# Patient Record
Sex: FEMALE | Race: WHITE | NOT HISPANIC OR LATINO | URBAN - METROPOLITAN AREA
[De-identification: names, ages, dates, MRNs, and addresses within clinical notes are randomized per-mention and may not be internally consistent; named-entity substitution may affect disease eponyms.]

---

## 2018-02-13 ENCOUNTER — INPATIENT (INPATIENT)
Facility: HOSPITAL | Age: 73
LOS: 13 days | DRG: 435 | End: 2018-02-27
Attending: INTERNAL MEDICINE | Admitting: INTERNAL MEDICINE
Payer: MEDICARE

## 2018-02-13 VITALS
OXYGEN SATURATION: 95 % | DIASTOLIC BLOOD PRESSURE: 83 MMHG | SYSTOLIC BLOOD PRESSURE: 131 MMHG | HEART RATE: 80 BPM | RESPIRATION RATE: 30 BRPM | TEMPERATURE: 98 F

## 2018-02-13 DIAGNOSIS — J18.9 PNEUMONIA, UNSPECIFIED ORGANISM: ICD-10-CM

## 2018-02-13 DIAGNOSIS — Z90.11 ACQUIRED ABSENCE OF RIGHT BREAST AND NIPPLE: Chronic | ICD-10-CM

## 2018-02-13 DIAGNOSIS — Z98.89 OTHER SPECIFIED POSTPROCEDURAL STATES: Chronic | ICD-10-CM

## 2018-02-13 DIAGNOSIS — Z95.828 PRESENCE OF OTHER VASCULAR IMPLANTS AND GRAFTS: Chronic | ICD-10-CM

## 2018-02-13 DIAGNOSIS — Z98.42 CATARACT EXTRACTION STATUS, LEFT EYE: Chronic | ICD-10-CM

## 2018-02-13 LAB
ALBUMIN SERPL ELPH-MCNC: 2.4 G/DL — LOW (ref 3.3–5)
ALP SERPL-CCNC: 244 U/L — HIGH (ref 40–120)
ALT FLD-CCNC: 48 U/L RC — HIGH (ref 10–45)
ANION GAP SERPL CALC-SCNC: 16 MMOL/L — SIGNIFICANT CHANGE UP (ref 5–17)
ANISOCYTOSIS BLD QL: SLIGHT — SIGNIFICANT CHANGE UP
APTT BLD: 31.7 SEC — SIGNIFICANT CHANGE UP (ref 27.5–37.4)
AST SERPL-CCNC: 90 U/L — HIGH (ref 10–40)
BASE EXCESS BLDV CALC-SCNC: 3.1 MMOL/L — HIGH (ref -2–2)
BASE EXCESS BLDV CALC-SCNC: 4.3 MMOL/L — HIGH (ref -2–2)
BILIRUB SERPL-MCNC: 5.2 MG/DL — HIGH (ref 0.2–1.2)
BUN SERPL-MCNC: 60 MG/DL — HIGH (ref 7–23)
CA-I SERPL-SCNC: 1.09 MMOL/L — LOW (ref 1.12–1.3)
CA-I SERPL-SCNC: 1.12 MMOL/L — SIGNIFICANT CHANGE UP (ref 1.12–1.3)
CALCIUM SERPL-MCNC: 8.7 MG/DL — SIGNIFICANT CHANGE UP (ref 8.4–10.5)
CHLORIDE BLDV-SCNC: 100 MMOL/L — SIGNIFICANT CHANGE UP (ref 96–108)
CHLORIDE BLDV-SCNC: 101 MMOL/L — SIGNIFICANT CHANGE UP (ref 96–108)
CHLORIDE SERPL-SCNC: 94 MMOL/L — LOW (ref 96–108)
CO2 BLDV-SCNC: 28 MMOL/L — SIGNIFICANT CHANGE UP (ref 22–30)
CO2 BLDV-SCNC: 29 MMOL/L — SIGNIFICANT CHANGE UP (ref 22–30)
CO2 SERPL-SCNC: 26 MMOL/L — SIGNIFICANT CHANGE UP (ref 22–31)
CREAT SERPL-MCNC: 1.67 MG/DL — HIGH (ref 0.5–1.3)
DACRYOCYTES BLD QL SMEAR: SLIGHT — SIGNIFICANT CHANGE UP
ELLIPTOCYTES BLD QL SMEAR: SLIGHT — SIGNIFICANT CHANGE UP
GAS PNL BLDV: 128 MMOL/L — LOW (ref 136–145)
GAS PNL BLDV: 128 MMOL/L — LOW (ref 136–145)
GAS PNL BLDV: SIGNIFICANT CHANGE UP
GLUCOSE BLDC GLUCOMTR-MCNC: 165 MG/DL — HIGH (ref 70–99)
GLUCOSE BLDV-MCNC: 76 MG/DL — SIGNIFICANT CHANGE UP (ref 70–99)
GLUCOSE BLDV-MCNC: 84 MG/DL — SIGNIFICANT CHANGE UP (ref 70–99)
GLUCOSE SERPL-MCNC: 79 MG/DL — SIGNIFICANT CHANGE UP (ref 70–99)
HCO3 BLDV-SCNC: 27 MMOL/L — SIGNIFICANT CHANGE UP (ref 21–29)
HCO3 BLDV-SCNC: 28 MMOL/L — SIGNIFICANT CHANGE UP (ref 21–29)
HCT VFR BLD CALC: 35.6 % — SIGNIFICANT CHANGE UP (ref 34.5–45)
HCT VFR BLDA CALC: 37 % — LOW (ref 39–50)
HCT VFR BLDA CALC: 39 % — SIGNIFICANT CHANGE UP (ref 39–50)
HGB BLD CALC-MCNC: 11.9 G/DL — SIGNIFICANT CHANGE UP (ref 11.5–15.5)
HGB BLD CALC-MCNC: 12.7 G/DL — SIGNIFICANT CHANGE UP (ref 11.5–15.5)
HGB BLD-MCNC: 11.6 G/DL — SIGNIFICANT CHANGE UP (ref 11.5–15.5)
HOROWITZ INDEX BLDV+IHG-RTO: SIGNIFICANT CHANGE UP
HOROWITZ INDEX BLDV+IHG-RTO: SIGNIFICANT CHANGE UP
INR BLD: 1.51 RATIO — HIGH (ref 0.88–1.16)
LACTATE BLDV-MCNC: 3.6 MMOL/L — HIGH (ref 0.7–2)
LACTATE BLDV-MCNC: 4 MMOL/L — CRITICAL HIGH (ref 0.7–2)
LACTATE BLDV-MCNC: 5 MMOL/L — CRITICAL HIGH (ref 0.7–2)
LYMPHOCYTES # BLD AUTO: 2.9 K/UL — SIGNIFICANT CHANGE UP (ref 1–3.3)
LYMPHOCYTES # BLD AUTO: 21 % — SIGNIFICANT CHANGE UP (ref 13–44)
MACROCYTES BLD QL: SIGNIFICANT CHANGE UP
MCHC RBC-ENTMCNC: 32.7 GM/DL — SIGNIFICANT CHANGE UP (ref 32–36)
MCHC RBC-ENTMCNC: 35.8 PG — HIGH (ref 27–34)
MCV RBC AUTO: 110 FL — HIGH (ref 80–100)
MICROCYTES BLD QL: SLIGHT — SIGNIFICANT CHANGE UP
MONOCYTES # BLD AUTO: 1.1 K/UL — HIGH (ref 0–0.9)
MONOCYTES NFR BLD AUTO: 9 % — SIGNIFICANT CHANGE UP (ref 2–14)
NEUTROPHILS # BLD AUTO: 11.1 K/UL — HIGH (ref 1.8–7.4)
NEUTROPHILS NFR BLD AUTO: 70 % — SIGNIFICANT CHANGE UP (ref 43–77)
NEUTS HYPERSEG # BLD: PRESENT — SIGNIFICANT CHANGE UP
NRBC # BLD: 2 /100 — HIGH (ref 0–0)
NT-PROBNP SERPL-SCNC: 2480 PG/ML — HIGH (ref 0–300)
OVALOCYTES BLD QL SMEAR: SLIGHT — SIGNIFICANT CHANGE UP
PCO2 BLDV: 36 MMHG — SIGNIFICANT CHANGE UP (ref 35–50)
PCO2 BLDV: 44 MMHG — SIGNIFICANT CHANGE UP (ref 35–50)
PH BLDV: 7.41 — SIGNIFICANT CHANGE UP (ref 7.35–7.45)
PH BLDV: 7.49 — HIGH (ref 7.35–7.45)
PLAT MORPH BLD: NORMAL — SIGNIFICANT CHANGE UP
PLATELET # BLD AUTO: 43 K/UL — LOW (ref 150–400)
PO2 BLDV: 26 MMHG — SIGNIFICANT CHANGE UP (ref 25–45)
PO2 BLDV: 27 MMHG — SIGNIFICANT CHANGE UP (ref 25–45)
POIKILOCYTOSIS BLD QL AUTO: SLIGHT — SIGNIFICANT CHANGE UP
POTASSIUM BLDV-SCNC: 4 MMOL/L — SIGNIFICANT CHANGE UP (ref 3.5–5)
POTASSIUM BLDV-SCNC: 4.6 MMOL/L — SIGNIFICANT CHANGE UP (ref 3.5–5)
POTASSIUM SERPL-MCNC: 4.4 MMOL/L — SIGNIFICANT CHANGE UP (ref 3.5–5.3)
POTASSIUM SERPL-SCNC: 4.4 MMOL/L — SIGNIFICANT CHANGE UP (ref 3.5–5.3)
PROT SERPL-MCNC: 5.3 G/DL — LOW (ref 6–8.3)
PROTHROM AB SERPL-ACNC: 16.5 SEC — HIGH (ref 9.8–12.7)
RAPID RVP RESULT: SIGNIFICANT CHANGE UP
RBC # BLD: 3.25 M/UL — LOW (ref 3.8–5.2)
RBC # FLD: 20 % — HIGH (ref 10.3–14.5)
RBC BLD AUTO: ABNORMAL
SAO2 % BLDV: 39 % — LOW (ref 67–88)
SAO2 % BLDV: 40 % — LOW (ref 67–88)
SCHISTOCYTES BLD QL AUTO: SLIGHT — SIGNIFICANT CHANGE UP
SODIUM SERPL-SCNC: 136 MMOL/L — SIGNIFICANT CHANGE UP (ref 135–145)
TROPONIN T SERPL-MCNC: <0.01 NG/ML — SIGNIFICANT CHANGE UP (ref 0–0.06)
WBC # BLD: 15.2 K/UL — HIGH (ref 3.8–10.5)
WBC # FLD AUTO: 15.2 K/UL — HIGH (ref 3.8–10.5)

## 2018-02-13 PROCEDURE — 99223 1ST HOSP IP/OBS HIGH 75: CPT

## 2018-02-13 PROCEDURE — 71045 X-RAY EXAM CHEST 1 VIEW: CPT | Mod: 26

## 2018-02-13 PROCEDURE — 99291 CRITICAL CARE FIRST HOUR: CPT | Mod: 25,GC

## 2018-02-13 PROCEDURE — 74176 CT ABD & PELVIS W/O CONTRAST: CPT | Mod: 26

## 2018-02-13 PROCEDURE — 93010 ELECTROCARDIOGRAM REPORT: CPT

## 2018-02-13 RX ORDER — ESCITALOPRAM OXALATE 10 MG/1
1 TABLET, FILM COATED ORAL
Qty: 0 | Refills: 0 | COMMUNITY

## 2018-02-13 RX ORDER — DEXTROSE 50 % IN WATER 50 %
12.5 SYRINGE (ML) INTRAVENOUS ONCE
Qty: 0 | Refills: 0 | Status: DISCONTINUED | OUTPATIENT
Start: 2018-02-13 | End: 2018-02-25

## 2018-02-13 RX ORDER — DEXTROSE 50 % IN WATER 50 %
1 SYRINGE (ML) INTRAVENOUS ONCE
Qty: 0 | Refills: 0 | Status: DISCONTINUED | OUTPATIENT
Start: 2018-02-13 | End: 2018-02-25

## 2018-02-13 RX ORDER — CEFEPIME 1 G/1
2000 INJECTION, POWDER, FOR SOLUTION INTRAMUSCULAR; INTRAVENOUS ONCE
Qty: 0 | Refills: 0 | Status: COMPLETED | OUTPATIENT
Start: 2018-02-13 | End: 2018-02-13

## 2018-02-13 RX ORDER — INSULIN ASPART 100 [IU]/ML
0 INJECTION, SOLUTION SUBCUTANEOUS
Qty: 0 | Refills: 0 | COMMUNITY

## 2018-02-13 RX ORDER — CEFEPIME 1 G/1
INJECTION, POWDER, FOR SOLUTION INTRAMUSCULAR; INTRAVENOUS
Qty: 0 | Refills: 0 | Status: DISCONTINUED | OUTPATIENT
Start: 2018-02-13 | End: 2018-02-13

## 2018-02-13 RX ORDER — FUROSEMIDE 40 MG
40 TABLET ORAL ONCE
Qty: 0 | Refills: 0 | Status: COMPLETED | OUTPATIENT
Start: 2018-02-13 | End: 2018-02-13

## 2018-02-13 RX ORDER — FUROSEMIDE 40 MG
40 TABLET ORAL DAILY
Qty: 0 | Refills: 0 | Status: DISCONTINUED | OUTPATIENT
Start: 2018-02-14 | End: 2018-02-14

## 2018-02-13 RX ORDER — FUROSEMIDE 40 MG
1 TABLET ORAL
Qty: 0 | Refills: 0 | COMMUNITY

## 2018-02-13 RX ORDER — INSULIN LISPRO 100/ML
VIAL (ML) SUBCUTANEOUS
Qty: 0 | Refills: 0 | Status: DISCONTINUED | OUTPATIENT
Start: 2018-02-13 | End: 2018-02-19

## 2018-02-13 RX ORDER — DEXTROSE 50 % IN WATER 50 %
25 SYRINGE (ML) INTRAVENOUS ONCE
Qty: 0 | Refills: 0 | Status: DISCONTINUED | OUTPATIENT
Start: 2018-02-13 | End: 2018-02-25

## 2018-02-13 RX ORDER — ASCORBIC ACID 60 MG
0 TABLET,CHEWABLE ORAL
Qty: 0 | Refills: 0 | COMMUNITY

## 2018-02-13 RX ORDER — GLUCAGON INJECTION, SOLUTION 0.5 MG/.1ML
1 INJECTION, SOLUTION SUBCUTANEOUS ONCE
Qty: 0 | Refills: 0 | Status: DISCONTINUED | OUTPATIENT
Start: 2018-02-13 | End: 2018-02-25

## 2018-02-13 RX ORDER — PANTOPRAZOLE SODIUM 20 MG/1
40 TABLET, DELAYED RELEASE ORAL
Qty: 0 | Refills: 0 | Status: DISCONTINUED | OUTPATIENT
Start: 2018-02-13 | End: 2018-02-25

## 2018-02-13 RX ORDER — DEXLANSOPRAZOLE 30 MG/1
1 CAPSULE, DELAYED RELEASE ORAL
Qty: 0 | Refills: 0 | COMMUNITY

## 2018-02-13 RX ORDER — INSULIN LISPRO 100/ML
VIAL (ML) SUBCUTANEOUS AT BEDTIME
Qty: 0 | Refills: 0 | Status: DISCONTINUED | OUTPATIENT
Start: 2018-02-13 | End: 2018-02-19

## 2018-02-13 RX ORDER — SIMVASTATIN 20 MG/1
10 TABLET, FILM COATED ORAL
Qty: 0 | Refills: 0 | COMMUNITY

## 2018-02-13 RX ORDER — PROPRANOLOL HCL 160 MG
0 CAPSULE, EXTENDED RELEASE 24HR ORAL
Qty: 0 | Refills: 0 | COMMUNITY

## 2018-02-13 RX ORDER — PROPRANOLOL HCL 160 MG
10 CAPSULE, EXTENDED RELEASE 24HR ORAL DAILY
Qty: 0 | Refills: 0 | Status: DISCONTINUED | OUTPATIENT
Start: 2018-02-13 | End: 2018-02-24

## 2018-02-13 RX ORDER — INSULIN GLARGINE 100 [IU]/ML
32 INJECTION, SOLUTION SUBCUTANEOUS AT BEDTIME
Qty: 0 | Refills: 0 | Status: DISCONTINUED | OUTPATIENT
Start: 2018-02-13 | End: 2018-02-25

## 2018-02-13 RX ORDER — DIPHENOXYLATE HCL/ATROPINE 2.5-.025MG
0 TABLET ORAL
Qty: 0 | Refills: 0 | COMMUNITY

## 2018-02-13 RX ORDER — SPIRONOLACTONE 25 MG/1
1 TABLET, FILM COATED ORAL
Qty: 0 | Refills: 0 | COMMUNITY

## 2018-02-13 RX ORDER — SIMVASTATIN 20 MG/1
10 TABLET, FILM COATED ORAL AT BEDTIME
Qty: 0 | Refills: 0 | Status: DISCONTINUED | OUTPATIENT
Start: 2018-02-13 | End: 2018-02-15

## 2018-02-13 RX ORDER — SODIUM CHLORIDE 9 MG/ML
1000 INJECTION, SOLUTION INTRAVENOUS
Qty: 0 | Refills: 0 | Status: DISCONTINUED | OUTPATIENT
Start: 2018-02-13 | End: 2018-02-27

## 2018-02-13 RX ORDER — POTASSIUM CHLORIDE 20 MEQ
0 PACKET (EA) ORAL
Qty: 0 | Refills: 0 | COMMUNITY

## 2018-02-13 RX ORDER — SIMVASTATIN 20 MG/1
0 TABLET, FILM COATED ORAL
Qty: 0 | Refills: 0 | COMMUNITY

## 2018-02-13 RX ORDER — ESCITALOPRAM OXALATE 10 MG/1
20 TABLET, FILM COATED ORAL DAILY
Qty: 0 | Refills: 0 | Status: DISCONTINUED | OUTPATIENT
Start: 2018-02-13 | End: 2018-02-25

## 2018-02-13 RX ORDER — VANCOMYCIN HCL 1 G
1000 VIAL (EA) INTRAVENOUS ONCE
Qty: 0 | Refills: 0 | Status: COMPLETED | OUTPATIENT
Start: 2018-02-13 | End: 2018-02-13

## 2018-02-13 RX ADMIN — CEFEPIME 100 MILLIGRAM(S): 1 INJECTION, POWDER, FOR SOLUTION INTRAMUSCULAR; INTRAVENOUS at 21:06

## 2018-02-13 RX ADMIN — SIMVASTATIN 10 MILLIGRAM(S): 20 TABLET, FILM COATED ORAL at 22:47

## 2018-02-13 RX ADMIN — Medication 250 MILLIGRAM(S): at 22:41

## 2018-02-13 RX ADMIN — Medication 40 MILLIGRAM(S): at 17:05

## 2018-02-13 RX ADMIN — INSULIN GLARGINE 32 UNIT(S): 100 INJECTION, SOLUTION SUBCUTANEOUS at 23:50

## 2018-02-13 NOTE — ED PROVIDER NOTE - MEDICAL DECISION MAKING DETAILS
72F hx htn hld dm breast CA on chemo (hasn't been on for 2 weeks) presents to the ED for SOB. SOB worse laying flat and with exertion. mild cough. Symptoms getting worse over the past 2 weeks. weight gain. fluid gain in abdomen in b/l LE. never had chf before. no hx blood clot. no fevers chills cp ha dizziness abd pain nausea or vomiting. On exam pt in mild resp distress rales b/l rrr abd distended but non-tender. aaox3 2+ b/l pitting edema. Concern for likely chf but will consider PE given active cancer. Will obtain labs xray ekg CE cta chest and admit. Sylvester Sloan M.D., Attending Physician

## 2018-02-13 NOTE — ED PROVIDER NOTE - ATTENDING CONTRIBUTION TO CARE
72F hx htn hld dm breast CA on chemo (hasn't been on for 2 weeks) presents to the ED for SOB. SOB worse laying flat and with exertion. mild cough. Symptoms getting worse over the past 2 weeks. weight gain. fluid gain in abdomen in b/l LE. never had chf before. no hx blood clot. no fevers chills cp ha dizziness abd pain nausea or vomiting. On exam pt in mild resp distress rales b/l rrr abd distended but non-tender. aaox3 2+ b/l pitting edema. Concern for likely chf but will consider PE given active cancer. Will obtain labs xray ekg CE cta chest and admit.     Constitutional: No fever or chills  Eyes: No visual changes  HEENT: No throat pain  CV: No chest pain  Resp: + SOB + cough  GI: No abd pain, nausea or vomiting +distension  : No dysuria  MSK: No musculoskeletal pain  Skin: No rash  Neuro: No headache     Constitutional: mild distress AAOx3  Eyes: PERRLA EOMI  Head: Normocephalic atraumatic  Mouth: MMM  Cardiac: regular rate 2+ b/l pitting edema.  Resp: rales b/l with diminished at bases  GI: Abd s/nt/nd  Neuro: CN2-12 intact  Skin: No rashes   Sylvester Sloan M.D., Attending Physician

## 2018-02-13 NOTE — ED PROVIDER NOTE - PHYSICAL EXAMINATION
Gen: moderate respiratory distress, AOx3  Head: NCAT  HEENT: PERRL, oral mucosa moist, normal conjunctiva, neck supple  Lung: CTAB, tachypneic, +supraclavicular retractions with belly breathing  CV: rrr, no murmur, Normal perfusion  Abd: soft, NT, diffusely distended with fluid wave  MSK: +3 b/l LE pitting edema, no visible deformities  Neuro: No focal neurologic deficits  Skin: No rash   Psych: normal affect

## 2018-02-13 NOTE — H&P ADULT - NSHPPHYSICALEXAM_GEN_ALL_CORE
PHYSICAL EXAM:  Vital Signs Last 24 Hrs  T(C): 36.7 (02-13-18 @ 19:13)  T(F): 98 (02-13-18 @ 19:13), Max: 98 (02-13-18 @ 19:13)  HR: 89 (02-13-18 @ 21:49) (68 - 89)  BP: 119/52 (02-13-18 @ 21:49)  BP(mean): --  RR: 25 (02-13-18 @ 21:49) (25 - 30)  SpO2: 96% (02-13-18 @ 21:49) (95% - 100%)  Wt(kg): --    Constitutional: NAD, now off BIPAP  EYES: EOMI  ENT:  Normal Hearing, no tonsillar exudates   Neck: Soft and supple, No JVD  Lungs: decreased breath sound left base  Heart: S1 and S2, regular rate and rhythm, no Murmurs, gallops or rubs  Abdomen: Bowel Sounds present, soft, nontender, nondistended, no guarding, no rebound  Extremities: No cyanosis or clubbing; warm to touch  Vascular: 2+ peripheral pulses lower ex  Neurological: A/O x 3, no focal deficits  Musculoskeletal: 5/5 strength b/l upper and lower extremities; +3 edema b/l LE  Skin: No rashes  Psych: no depression or anhedonia  HEME: no bruises, no nose bleeds

## 2018-02-13 NOTE — H&P ADULT - PSH
H/O craniotomy    H/O mastectomy, right    H/O oral surgery  wisdom tooth removal  Portacath in place  left upper chest  S/P left cataract extraction

## 2018-02-13 NOTE — ED PROVIDER NOTE - PMH
Breast cancer    Depression    Endogenous hyperlipemia    Hypertension    Tremor    Type 2 diabetes mellitus

## 2018-02-13 NOTE — H&P ADULT - HISTORY OF PRESENT ILLNESS
71 yo female with PMH of breast cancer with mets to lung, liver, bones, s/p mastectomy, s/p depression, HTN, HLD, T2DM, presents with SOB and LE swelling.  Patient has been doing well except last 2-3 weeks had SOB and LE edema.  She also had cough, for which she was started on prednisone.  Currently cough has completely resolved.  She was initially started on lasix 2 pills of 20mg daily, which was decreased to 1 pill/daily two weeks ago because patient has been urinating too much.  Over the last 2 weeks, she has been becoming progressively more weak.  Last three days she had worsening of her LE edema to the point that she can't ambulate well.  She can't walk more than a few feet without having dyspnea.  She now has to sleep on a recliner.  She also has unsteady gait, had recent MRI brain which was apparently negative. 73 yo female with PMH of breast cancer with mets to lung, liver, bones, s/p mastectomy, s/p depression, HTN, HLD, T2DM, presents with SOB and LE swelling.  Patient has been doing well except last 2-3 weeks had SOB and LE edema.  She also had cough, for which she was started on prednisone.  Currently cough has completely resolved.  She was initially started on lasix 2 pills of 20mg daily, which was decreased to 1 pill/daily two weeks ago because patient has been urinating too much.  Over the last 2 weeks, she has been becoming progressively more weak.  Last three days she had worsening of her LE edema to the point that she can't ambulate well.  She can't walk more than a few feet without having dyspnea.  She now has to sleep on a recliner.  She denies PND.  She also has unsteady gait, had recent MRI brain which was apparently negative.  She admits to drinking 3-4 bottles of water and 1-2 cans of gingerale daily.  She denies any chest pain, dizziness, blurry vision, cough, fever, urinary or bowel changes.

## 2018-02-13 NOTE — H&P ADULT - PROBLEM SELECTOR PLAN 1
Patient with dyspnea and LE edema, possibly secondary to fluid overload  PE could not be ruled out, though less likely; not tachycardic and now saturating well on room air  CTA chest could not be done due to PIERRE  will get V/Q scan  no fever or cough, unlikely pneumonia though CT scan shows left base opacity ?pleural effusion;  leukocytosis likely due to steroid use   s/p IV vancomycin and IV cefepime; we will hold off on further abx at this time, unless patient develops fever or other symptoms suggestive of pneumonia  s/p lasix 40mg IV in ED  will start patient on lasix 40mg IV daily  check echocardiogram  monitor ins/outs and daily weight Patient with dyspnea and LE edema, possibly secondary to fluid overload  PE could not be ruled out, though less likely; not tachycardic and now saturating well on room air  CTA chest could not be done due to PIERRE  will get V/Q scan  no fever or cough, unlikely pneumonia though CT scan shows left base opacity ?pleural effusion;  leukocytosis likely due to steroid use   s/p IV vancomycin and IV cefepime; we will hold off on further abx at this time, unless patient develops fever or other symptoms suggestive of pneumonia  s/p lasix 40mg IV in ED  will start patient on lasix 40mg IV daily  check echocardiogram  monitor ins/outs and daily weight  check venous duplex to rule out DVT LE

## 2018-02-13 NOTE — ED ADULT NURSE NOTE - OBJECTIVE STATEMENT
72 year old female a/ox3 sleepy and slow to respond presenting to ed with progressively worsening bilateral lower ext swelling and shortness of breath. patient with pmh of sided breast ca with mets to spine, liver, and Lungs, was on chemo 2 weeks ago but per family chemo was stopped due to the swelling. denies any fever/chills. on lasix 20mg po at home. denies cp/pressure, respirations shallow and tachypneic  no change in bowel/bladder skin dry warm intact. family at bedside.

## 2018-02-13 NOTE — H&P ADULT - PROBLEM SELECTOR PLAN 4
likely due to metastasis  no biliary obstruction seen on CT scan  monitor LFTs for now  check US doppler to assess for PVT

## 2018-02-13 NOTE — H&P ADULT - PROBLEM SELECTOR PLAN 10
I had a 20 minute discussion with patient and her , Jesse who is also HCP regarding goals of care.  I had discussed all resuscitative measures.  Both patient and her  verbalized understanding.  At this time, she chooses to be DNR/DNI;  Form signed and placed in chart.  Order for DNR/DNI placed

## 2018-02-13 NOTE — ED PROVIDER NOTE - CARE PLAN
Principal Discharge DX:	Pneumonia  Secondary Diagnosis:	Dyspnea, unspecified type  Secondary Diagnosis:	CHF exacerbation

## 2018-02-13 NOTE — ED PROVIDER NOTE - NS ED ROS FT
ROS: no CP +SOB. +cough. no fever. no n/v/d/c. no abd pain. no rash. no bleeding. no urinary complaints. +weakness. no vision changes. no HA. no neck/back pain. no extremity swelling/deformity. +change in mental status.

## 2018-02-13 NOTE — H&P ADULT - NSHPLABSRESULTS_GEN_ALL_CORE
Labs personally reviewed:                          11.6   15.2  )-----------( 43       ( 13 Feb 2018 17:08 )             35.6     02-13    136  |  94<L>  |  60<H>  ----------------------------<  79  4.4   |  26  |  1.67<H>    Ca    8.7      13 Feb 2018 17:08    TPro  5.3<L>  /  Alb  2.4<L>  /  TBili  5.2<H>  /  DBili  x   /  AST  90<H>  /  ALT  48<H>  /  AlkPhos  244<H>  02-13    CARDIAC MARKERS ( 13 Feb 2018 17:08 )  x     / <0.01 ng/mL / x     / x     / x          LIVER FUNCTIONS - ( 13 Feb 2018 17:08 )  Alb: 2.4 g/dL / Pro: 5.3 g/dL / ALK PHOS: 244 U/L / ALT: 48 U/L RC / AST: 90 U/L / GGT: x           PT/INR - ( 13 Feb 2018 17:08 )   PT: 16.5 sec;   INR: 1.51 ratio         PTT - ( 13 Feb 2018 17:08 )  PTT:31.7 sec    CAPILLARY BLOOD GLUCOSE      POCT Blood Glucose.: 116 mg/dL (13 Feb 2018 16:36)      Imaging:  CXR personally reviewed: no focal opacity  CT abd/pelv reviewed: left pleural effusion; left basilar consolidation; metastatic disease to liver; moderate ascites, sclerotic osseous metastases to vertebra    EKG personally reviewed: nsr, no acute st changes    Old records reviewed:  2/1/18 WBC 4, Hb 11.2, Platelet 89; sodium 137, potassium 2.8, chloride 101, BUN 34, cr 1.2; January 2018 creatinine 0.8  2/1/18 Bili 2.5, alk phos 189, AST 97, ALT 87

## 2018-02-13 NOTE — H&P ADULT - PROBLEM SELECTOR PLAN 5
hold triamterene/HCTZ for now  hold aldactone for now Unclear etiology, not likely septic  will repeat lactate level

## 2018-02-13 NOTE — ED PROVIDER NOTE - CRITICAL CARE PROVIDED
additional history taking/documentation/direct patient care (not related to procedure)/conducted a detailed discussion of DNR status

## 2018-02-13 NOTE — H&P ADULT - PROBLEM SELECTOR PLAN 3
baseline creatinine likely 0.8  unclear etiology of PIERRE, ? metastases; CT scan shows hyperdense lesions  will monitor BMP while patient is on diuretics  hold triamterene/HCTZ for now  hold aldactone for now

## 2018-02-13 NOTE — H&P ADULT - PROBLEM SELECTOR PLAN 2
s/p lasix 40 mg IV in ED  will continue lasix 40mg IV daily for now  check echocardiogram  monitor ins/outs and daily weight  hold aldactone and triamterene/HCTZ for now

## 2018-02-13 NOTE — ED PROVIDER NOTE - OBJECTIVE STATEMENT
71yo F with SOB x several weeks, getting worse, today generalized weakness, increasing confusion. no fever. no trauma. no bleeding. no h/o CHF/ +cough nonproductive. chemo held for 2 weeks due to LE swelling and SOB. on lasix 20mg qD. no new meds. has not missed meds. no h/o DVT/PE. no sick contats/travel. has metastatic breast CA.     Onc- [facility] ([facility])

## 2018-02-13 NOTE — H&P ADULT - ASSESSMENT
73 yo female with PMH of breast cancer with mets to lung, liver, bones, s/p mastectomy, s/p depression, HTN, HLD, T2DM, presents with SOB and LE swelling.

## 2018-02-13 NOTE — ED PROVIDER NOTE - PROGRESS NOTE DETAILS
patient with low gfr - spoke with family regarding contrast for chest and alternative of v/q, family rather avoid contrast at this time. like dx chf less likely PE - pt also noted to have elevated tbili and alk phos. likely from mets however now with slight discomfort in ruq. will obtain non-con ct abd to eval for biliary pathology. Sylvester Sloan M.D., Attending Physician

## 2018-02-14 LAB
ALBUMIN SERPL ELPH-MCNC: 2.2 G/DL — LOW (ref 3.3–5)
ALP SERPL-CCNC: 221 U/L — HIGH (ref 40–120)
ALT FLD-CCNC: 41 U/L RC — SIGNIFICANT CHANGE UP (ref 10–45)
AMMONIA BLD-MCNC: 47 UMOL/L — SIGNIFICANT CHANGE UP (ref 11–55)
ANION GAP SERPL CALC-SCNC: 15 MMOL/L — SIGNIFICANT CHANGE UP (ref 5–17)
AST SERPL-CCNC: 82 U/L — HIGH (ref 10–40)
BASOPHILS # BLD AUTO: 0 K/UL — SIGNIFICANT CHANGE UP (ref 0–0.2)
BASOPHILS NFR BLD AUTO: 0.1 % — SIGNIFICANT CHANGE UP (ref 0–2)
BILIRUB SERPL-MCNC: 4.8 MG/DL — HIGH (ref 0.2–1.2)
BUN SERPL-MCNC: 65 MG/DL — HIGH (ref 7–23)
CALCIUM SERPL-MCNC: 8.5 MG/DL — SIGNIFICANT CHANGE UP (ref 8.4–10.5)
CHLORIDE SERPL-SCNC: 94 MMOL/L — LOW (ref 96–108)
CO2 SERPL-SCNC: 26 MMOL/L — SIGNIFICANT CHANGE UP (ref 22–31)
CREAT SERPL-MCNC: 1.75 MG/DL — HIGH (ref 0.5–1.3)
EOSINOPHIL # BLD AUTO: 0 K/UL — SIGNIFICANT CHANGE UP (ref 0–0.5)
EOSINOPHIL NFR BLD AUTO: 0.3 % — SIGNIFICANT CHANGE UP (ref 0–6)
GLUCOSE BLDC GLUCOMTR-MCNC: 124 MG/DL — HIGH (ref 70–99)
GLUCOSE BLDC GLUCOMTR-MCNC: 138 MG/DL — HIGH (ref 70–99)
GLUCOSE BLDC GLUCOMTR-MCNC: 149 MG/DL — HIGH (ref 70–99)
GLUCOSE BLDC GLUCOMTR-MCNC: 154 MG/DL — HIGH (ref 70–99)
GLUCOSE SERPL-MCNC: 162 MG/DL — HIGH (ref 70–99)
HBA1C BLD-MCNC: 5.6 % — SIGNIFICANT CHANGE UP (ref 4–5.6)
HCT VFR BLD CALC: 30.9 % — LOW (ref 34.5–45)
HGB BLD-MCNC: 10.9 G/DL — LOW (ref 11.5–15.5)
LACTATE BLDV-MCNC: 2.4 MMOL/L — HIGH (ref 0.7–2)
LYMPHOCYTES # BLD AUTO: 18.4 % — SIGNIFICANT CHANGE UP (ref 13–44)
LYMPHOCYTES # BLD AUTO: 2.1 K/UL — SIGNIFICANT CHANGE UP (ref 1–3.3)
MAGNESIUM SERPL-MCNC: 2.4 MG/DL — SIGNIFICANT CHANGE UP (ref 1.6–2.6)
MCHC RBC-ENTMCNC: 35.2 GM/DL — SIGNIFICANT CHANGE UP (ref 32–36)
MCHC RBC-ENTMCNC: 38.1 PG — HIGH (ref 27–34)
MCV RBC AUTO: 108 FL — HIGH (ref 80–100)
MONOCYTES # BLD AUTO: 0.6 K/UL — SIGNIFICANT CHANGE UP (ref 0–0.9)
MONOCYTES NFR BLD AUTO: 5.3 % — SIGNIFICANT CHANGE UP (ref 2–14)
NEUTROPHILS # BLD AUTO: 8.6 K/UL — HIGH (ref 1.8–7.4)
NEUTROPHILS NFR BLD AUTO: 75.9 % — SIGNIFICANT CHANGE UP (ref 43–77)
PHOSPHATE SERPL-MCNC: 5.5 MG/DL — HIGH (ref 2.5–4.5)
PLATELET # BLD AUTO: 35 K/UL — LOW (ref 150–400)
POTASSIUM SERPL-MCNC: 3.7 MMOL/L — SIGNIFICANT CHANGE UP (ref 3.5–5.3)
POTASSIUM SERPL-SCNC: 3.7 MMOL/L — SIGNIFICANT CHANGE UP (ref 3.5–5.3)
PROT SERPL-MCNC: 4.5 G/DL — LOW (ref 6–8.3)
RBC # BLD: 2.85 M/UL — LOW (ref 3.8–5.2)
RBC # FLD: 20 % — HIGH (ref 10.3–14.5)
SODIUM SERPL-SCNC: 135 MMOL/L — SIGNIFICANT CHANGE UP (ref 135–145)
WBC # BLD: 11.3 K/UL — HIGH (ref 3.8–10.5)
WBC # FLD AUTO: 11.3 K/UL — HIGH (ref 3.8–10.5)

## 2018-02-14 PROCEDURE — 78582 LUNG VENTILAT&PERFUS IMAGING: CPT | Mod: 26

## 2018-02-14 PROCEDURE — 93306 TTE W/DOPPLER COMPLETE: CPT | Mod: 26

## 2018-02-14 PROCEDURE — 93970 EXTREMITY STUDY: CPT | Mod: 26

## 2018-02-14 PROCEDURE — 93975 VASCULAR STUDY: CPT | Mod: 26

## 2018-02-14 RX ADMIN — Medication 40 MILLIGRAM(S): at 06:33

## 2018-02-14 RX ADMIN — INSULIN GLARGINE 32 UNIT(S): 100 INJECTION, SOLUTION SUBCUTANEOUS at 21:09

## 2018-02-14 RX ADMIN — Medication 10 MILLIGRAM(S): at 06:35

## 2018-02-14 RX ADMIN — ESCITALOPRAM OXALATE 20 MILLIGRAM(S): 10 TABLET, FILM COATED ORAL at 19:47

## 2018-02-14 RX ADMIN — SIMVASTATIN 10 MILLIGRAM(S): 20 TABLET, FILM COATED ORAL at 19:47

## 2018-02-14 RX ADMIN — PANTOPRAZOLE SODIUM 40 MILLIGRAM(S): 20 TABLET, DELAYED RELEASE ORAL at 06:35

## 2018-02-14 RX ADMIN — Medication 5 MILLIGRAM(S): at 06:35

## 2018-02-14 NOTE — CONSULT NOTE ADULT - ASSESSMENT
72 year old female with breast cancer with mets to lung, liver, bones, s/p mastectomy, s/p depression, HTN, HLD, T2DM admitted with LE edema/ SOB     1. cv stable   no chest pain or sob.  EKG revealing no evidence of acute ischemia/ACS   echo revealing normal lv sys fx     2. SOB   likely secondary to pulm disease   echo revealing normal lv sys fx   VQ scan revealing low probability of PE   chest xray revealing sml Left pleural effusion   Hold lasix given PIERRE , no evidence of decomp CHF on exam   pulm f/u     3. LE edema   likely secondary to hypoalbuminemia / extensive mets disease   Ct abd/pelvis revealing bilobar hepatic mets/ sclerotic osseous mets/ sml left pleural effusion   elevated lfts- pending ammonia levels   LE duplex negative for thrombosis   lasix on hold given PIERRE   med/ onc f/u     3. htn   continue with current anti-htn med

## 2018-02-14 NOTE — CONSULT NOTE ADULT - CONSULT REASON
sob/ LE edema   hx breast cancer with mets to lung, liver, bones, s/p mastectomy, s/p depression, HTN, HLD, T2DM

## 2018-02-14 NOTE — PROGRESS NOTE ADULT - SUBJECTIVE AND OBJECTIVE BOX
Patient is a 72y old  Female who presents with a chief complaint of shortness of breath (13 Feb 2018 21:46)      SUBJECTIVE / OVERNIGHT EVENTS: still SOB.  Review of Systems  chest pain no  palpitations no  sob no  nausea no  headache no    MEDICATIONS  (STANDING):  dextrose 5%. 1000 milliLiter(s) (50 mL/Hr) IV Continuous <Continuous>  dextrose 50% Injectable 12.5 Gram(s) IV Push once  dextrose 50% Injectable 25 Gram(s) IV Push once  dextrose 50% Injectable 25 Gram(s) IV Push once  escitalopram 20 milliGRAM(s) Oral daily  furosemide   Injectable 40 milliGRAM(s) IV Push daily  insulin glargine Injectable (LANTUS) 32 Unit(s) SubCutaneous at bedtime  insulin lispro (HumaLOG) corrective regimen sliding scale   SubCutaneous three times a day before meals  insulin lispro (HumaLOG) corrective regimen sliding scale   SubCutaneous at bedtime  pantoprazole    Tablet 40 milliGRAM(s) Oral before breakfast  predniSONE   Tablet 5 milliGRAM(s) Oral daily  propranolol 10 milliGRAM(s) Oral daily  simvastatin 10 milliGRAM(s) Oral at bedtime    MEDICATIONS  (PRN):  dextrose Gel 1 Dose(s) Oral once PRN Blood Glucose LESS THAN 70 milliGRAM(s)/deciliter  glucagon  Injectable 1 milliGRAM(s) IntraMuscular once PRN Glucose LESS THAN 70 milligrams/deciliter          PHYSICAL EXAM:  GENERAL: weak  HEAD:  Atraumatic, Normocephalic  EYES: EOMI, PERRLA, conjunctiva and sclera clear  NECK: Supple, No JVD  CHEST/LUNG: Clear to auscultation bilaterally; No wheeze s/p R mastectomy  HEART: Regular rate and rhythm; No murmurs, rubs, or gallops  ABDOMEN: Soft, Nontender, Nondistended; Bowel sounds present  EXTREMITIES:  2+ Peripheral Pulses, No clubbing, cyanosis, 2+ edema  PSYCH: anxious  NEUROLOGY: non-focal  SKIN: No rashes or lesions    LABS:                        10.9   11.3  )-----------( 35       ( 14 Feb 2018 13:35 )             30.9     02-13    136  |  94<L>  |  60<H>  ----------------------------<  79  4.4   |  26  |  1.67<H>    Ca    8.7      13 Feb 2018 17:08    TPro  5.3<L>  /  Alb  2.4<L>  /  TBili  5.2<H>  /  DBili  x   /  AST  90<H>  /  ALT  48<H>  /  AlkPhos  244<H>  02-13    PT/INR - ( 13 Feb 2018 17:08 )   PT: 16.5 sec;   INR: 1.51 ratio         PTT - ( 13 Feb 2018 17:08 )  PTT:31.7 sec  CARDIAC MARKERS ( 13 Feb 2018 17:08 )  x     / <0.01 ng/mL / x     / x     / x            < from: NM Pulmonary Ventilation/Perfusion Scan (02.14.18 @ 10:56) >    IMPRESSION: Abnormal ventilation/perfusion lung scan.    Very low probability of pulmonary embolus.      < end of copied text >    RADIOLOGY & ADDITIONAL TESTS:    Imaging Personally Reviewed:    Consultant(s) Notes Reviewed:      Care Discussed with Consultants/Other Providers:

## 2018-02-14 NOTE — CONSULT NOTE ADULT - SUBJECTIVE AND OBJECTIVE BOX
[facility] - [facility]  CONSULT NOTE    HPI: 72 year old gentlewoman with history of HTN, DM, HLD and breast cancer breast cancer metastatic to the lung, liver and bones s/p mastectomy and radiation to the chest. She is followed by Dr. Juan Nelson of our practice for a chronic cough felt to be due to radiation pneumonitis and has been treated with a course of slowly tapering prednisone as well as Breo Ellipta Her spirometry is c/w moderate restrictive lung disease.     PMHX:  Breast cancer - metastatic   Hyperlipemia  Hypertension  Diabetes mellitus  Depression  Tremor    PSHX:  Portacath in place  S/P left cataract extraction  H/O oral surgery  H/O craniotomy  H/O mastectomy, right      FAMILY HISTORY:  Family history of breast cancer (Father)    SOCIAL HISTORY: remote smoker; ; lives with     Pulmonary Medications:       Antimicrobials:      Cardiology:  furosemide   Injectable 40 milliGRAM(s) IV Push daily  propranolol 10 milliGRAM(s) Oral daily      Other:  dextrose 5%. 1000 milliLiter(s) IV Continuous <Continuous>  dextrose 50% Injectable 12.5 Gram(s) IV Push once  dextrose 50% Injectable 25 Gram(s) IV Push once  dextrose 50% Injectable 25 Gram(s) IV Push once  dextrose Gel 1 Dose(s) Oral once PRN  escitalopram 20 milliGRAM(s) Oral daily  glucagon  Injectable 1 milliGRAM(s) IntraMuscular once PRN  insulin glargine Injectable (LANTUS) 32 Unit(s) SubCutaneous at bedtime  insulin lispro (HumaLOG) corrective regimen sliding scale   SubCutaneous three times a day before meals  insulin lispro (HumaLOG) corrective regimen sliding scale   SubCutaneous at bedtime  pantoprazole    Tablet 40 milliGRAM(s) Oral before breakfast  predniSONE   Tablet 5 milliGRAM(s) Oral daily  simvastatin 10 milliGRAM(s) Oral at bedtime      Allergies    Levaquin (Short breath)  penicillin (Swelling)    HOME MEDICATIONS: see  H & P    REVIEW OF SYSTEMS:  Constitutional: As per HPI  HEENT: Within normal limits  CV: As per HPI  Resp: As per HPI  GI: Within normal limits   : Within normal limits  Musculoskeletal: Within normal limits  Skin: Within normal limits  Neurological: Within normal limits  Psychiatric: Within normal limits  Endocrine: Within normal limits  Hematologic/Lymphatic: Within normal limits  Allergic/Immunologic: Within normal limits    [ ] All other systems negative    OBJECTIVE:      I&O's Detail    13 Feb 2018 07:01  -  14 Feb 2018 07:00  --------------------------------------------------------  IN:    Oral Fluid: 150 mL  Total IN: 150 mL    OUT:    Voided: 550 mL  Total OUT: 550 mL    Total NET: -400 mL      14 Feb 2018 07:01  -  14 Feb 2018 14:28  --------------------------------------------------------  IN:    Oral Fluid: 440 mL  Total IN: 440 mL    OUT:    Voided: 400 mL  Total OUT: 400 mL    Total NET: 40 mL    Daily Height in cm: 162.56 (13 Feb 2018 22:15)      POCT Blood Glucose.: 138 mg/dL (14 Feb 2018 12:22)  POCT Blood Glucose.: 124 mg/dL (14 Feb 2018 07:49)  POCT Blood Glucose.: 165 mg/dL (13 Feb 2018 23:47)  POCT Blood Glucose.: 116 mg/dL (13 Feb 2018 16:36)      PHYSICAL EXAM:  ICU Vital Signs Last 24 Hrs  T(C): 36.2 (14 Feb 2018 12:40), Max: 36.7 (13 Feb 2018 19:13)  T(F): 97.2 (14 Feb 2018 12:40), Max: 98 (13 Feb 2018 19:13)  HR: 77 (14 Feb 2018 12:40) (68 - 92)  BP: 119/68 (14 Feb 2018 12:40) (102/56 - 158/90)  BP(mean): --  ABP: --  ABP(mean): --  RR: 18 (14 Feb 2018 12:40) (18 - 30)  SpO2: 99% (14 Feb 2018 12:40) (95% - 100%)    General: Awake. Alert. Cooperative. No distress. Appears stated age 	  HEENT:   Atraumatic. Normocephalic. Anicteric. Normal oral mucosa, PERRL, EOMI  Neck: Supple. Trachea midline. Thyroid without enlargement/tenderness/nodules. No carotid bruit. No JVD	  Cardiovascular: Regular rate and rhythm. S1 S2 normal. No murmurs, rubs or gallops  Respiratory: Respirations unlabored. Clear to auscultation and percussion bilaterally  Abdomen: Soft. Non-tender. Non-distended. No organomegaly. No masses. Normal bowel sounds	  Extremities: Warm to touch. No clubbing or cyanosis. No pedal edema.  Pulses: 2+ peripheral pulses all extremities	  Skin: Normal skin color. No rashes or lesions. No ecchymoses, No cyanosis. Warm to touch  Lymph Nodes: Cervical, supraclavicular and axillary nodes normal  Neurological: Motor and sensory examination equal and normal. A and O x 3  Psychiatry: Appropriate mood and affect.      LABS:                          10.9   11.3  )-----------( 35       ( 14 Feb 2018 13:35 )             30.9                         11.6   15.2  )-----------( 43       ( 13 Feb 2018 17:08 )             35.6     02-13    136  |  94<L>  |  60<H>  ----------------------------<  79  4.4   |  26  |  1.67<H>    Ca      8.7      02-13      TPro  5.3<L>  /  Alb  2.4<L>  /  TBili  5.2<H>  /  DBili  x   /  AST  90<H>  /  ALT  48<H>  /  AlkPhos  244<H>  02-13    PT/INR - ( 13 Feb 2018 17:08 )   PT: 16.5 sec;   INR: 1.51 ratio       PTT - ( 13 Feb 2018 17:08 )  PTT:31.7 sec    Venous Blood Gas:  02-14 @ 13:36  --/--/--/--/--  VBG Lactate: 2.4    Venous Blood Gas:  02-13 @ 23:56  --/--/--/--/--  VBG Lactate: 3.6    Venous Blood Gas:  02-13 @ 18:36  7.49/36/26/27/40  VBG Lactate: 4.0    Venous Blood Gas:  02-13 @ 17:08  7.41/44/27/28/39  VBG Lactate: 5.0        Serum Pro-Brain Natriuretic Peptide: 2480 pg/mL (02-13 @ 17:08)      CARDIAC MARKERS ( 13 Feb 2018 17:08 )  x     / <0.01 ng/mL / x     / x     / x              MICROBIOLOGY:       RADIOLOGY:  [x ] Chest radiographs reviewed and interpreted by me [facility] - [facility]  CONSULT NOTE    HPI: 72 year old gentlewoman, remote smoker, with history of HTN, DM, HLD and breast cancer metastatic to the lung, liver and bone s/p mastectomy, craniotomy for a cranial metastasis and radiation to the left chest. She is followed by Dr. Juan Nelson of our practice for a chronic cough felt to be due to radiation pneumonitis and has been treated with a course of slowly tapering prednisone as well as Breo Ellipta with marked improvement. Her spirometry is c/w moderate restrictive lung disease. The patient was doing well ~ 1 month ago able to drive and attend family functions. Over the last several weeks, the patient has developed profound fatigue associated with worsening mental status. She has developed severe lower extremity swelling making ambulation difficult especially in the setting of a mildly unsteady gait. She has developed shortness of breath with minimal exertion without chest congestion or wheeze. No fevers, chills or sweats. No chest pain/pressure or palpitations. No URI/viral type symptoms    PMHX:  Breast cancer - metastatic   Hyperlipemia  Hypertension  Diabetes mellitus  Depression  Tremor    PSHX:  Portacath in place  S/P left cataract extraction  H/O oral surgery  H/O craniotomy for cranial met resection  H/O mastectomy, right      FAMILY HISTORY:  Family history of breast cancer (Father)    SOCIAL HISTORY: remote smoker; ; lives with     Pulmonary Medications:       Antimicrobials:      Cardiology:  furosemide   Injectable 40 milliGRAM(s) IV Push daily  propranolol 10 milliGRAM(s) Oral daily      Other:  dextrose 5%. 1000 milliLiter(s) IV Continuous <Continuous>  dextrose 50% Injectable 12.5 Gram(s) IV Push once  dextrose 50% Injectable 25 Gram(s) IV Push once  dextrose 50% Injectable 25 Gram(s) IV Push once  dextrose Gel 1 Dose(s) Oral once PRN  escitalopram 20 milliGRAM(s) Oral daily  glucagon  Injectable 1 milliGRAM(s) IntraMuscular once PRN  insulin glargine Injectable (LANTUS) 32 Unit(s) SubCutaneous at bedtime  insulin lispro (HumaLOG) corrective regimen sliding scale   SubCutaneous three times a day before meals  insulin lispro (HumaLOG) corrective regimen sliding scale   SubCutaneous at bedtime  pantoprazole    Tablet 40 milliGRAM(s) Oral before breakfast  predniSONE   Tablet 5 milliGRAM(s) Oral daily  simvastatin 10 milliGRAM(s) Oral at bedtime      Allergies    Levaquin (Short breath)  penicillin (Swelling)    HOME MEDICATIONS: see  H & P    REVIEW OF SYSTEMS:  Constitutional: As per HPI  HEENT: Within normal limits  CV: As per HPI  Resp: As per HPI  GI: Within normal limits   : Within normal limits  Musculoskeletal: Within normal limits  Skin: Within normal limits  Neurological: As per HPI  Psychiatric: Within normal limits  Endocrine: Within normal limits  Hematologic/Lymphatic: Within normal limits  Allergic/Immunologic: Within normal limits    [x] All other systems negative    OBJECTIVE:      I&O's Detail    13 Feb 2018 07:01  -  14 Feb 2018 07:00  --------------------------------------------------------  IN:    Oral Fluid: 150 mL  Total IN: 150 mL    OUT:    Voided: 550 mL  Total OUT: 550 mL    Total NET: -400 mL      14 Feb 2018 07:01  -  14 Feb 2018 14:28  --------------------------------------------------------  IN:    Oral Fluid: 440 mL  Total IN: 440 mL    OUT:    Voided: 400 mL  Total OUT: 400 mL    Total NET: 40 mL    Daily Height in cm: 162.56 (13 Feb 2018 22:15)      POCT Blood Glucose.: 138 mg/dL (14 Feb 2018 12:22)  POCT Blood Glucose.: 124 mg/dL (14 Feb 2018 07:49)  POCT Blood Glucose.: 165 mg/dL (13 Feb 2018 23:47)  POCT Blood Glucose.: 116 mg/dL (13 Feb 2018 16:36)      PHYSICAL EXAM:  ICU Vital Signs Last 24 Hrs  T(C): 36.2 (14 Feb 2018 12:40), Max: 36.7 (13 Feb 2018 19:13)  T(F): 97.2 (14 Feb 2018 12:40), Max: 98 (13 Feb 2018 19:13)  HR: 77 (14 Feb 2018 12:40) (68 - 92)  BP: 119/68 (14 Feb 2018 12:40) (102/56 - 158/90)  BP(mean): --  ABP: --  ABP(mean): --  RR: 18 (14 Feb 2018 12:40) (18 - 30)  SpO2: 99% (14 Feb 2018 12:40) (95% - 100%) on 2lpm    General: Awake. Alert. Sleepy. Confused. Cooperative. No distress. Appears stated age 	  HEENT:   Atraumatic. Normocephalic. Icteric sclerae. Normal oral mucosa, PERRL, EOMI  Neck: Supple. Trachea midline. Thyroid without enlargement/tenderness/nodules. No carotid bruit. No JVD	  Cardiovascular: Regular rate and rhythm. S1 S2 normal. No murmurs, rubs or gallops  Respiratory: Respirations unlabored. Clear to auscultation and percussion bilaterally  Abdomen: Soft. Non-tender. Non-distended. No organomegaly. No masses. Normal bowel sounds	  Extremities: Warm to touch. No clubbing or cyanosis. No pedal edema.  Pulses: 2+ peripheral pulses all extremities	  Skin: Normal skin color. No rashes or lesions. No ecchymoses, No cyanosis. Warm to touch  Lymph Nodes: Cervical, supraclavicular and axillary nodes normal  Neurological: Motor and sensory examination equal and normal. A and O x 3  Psychiatry: Appropriate mood and affect.      LABS:                          10.9   11.3  )-----------( 35       ( 14 Feb 2018 13:35 )             30.9                         11.6   15.2  )-----------( 43       ( 13 Feb 2018 17:08 )             35.6     02-13    136  |  94<L>  |  60<H>  ----------------------------<  79  4.4   |  26  |  1.67<H>    Ca      8.7      02-13      TPro  5.3<L>  /  Alb  2.4<L>  /  TBili  5.2<H>  /  DBili  x   /  AST  90<H>  /  ALT  48<H>  /  AlkPhos  244<H>  02-13    PT/INR - ( 13 Feb 2018 17:08 )   PT: 16.5 sec;   INR: 1.51 ratio       PTT - ( 13 Feb 2018 17:08 )  PTT:31.7 sec    Venous Blood Gas:  02-14 @ 13:36  --/--/--/--/--  VBG Lactate: 2.4    Venous Blood Gas:  02-13 @ 23:56  --/--/--/--/--  VBG Lactate: 3.6    Venous Blood Gas:  02-13 @ 18:36  7.49/36/26/27/40  VBG Lactate: 4.0    Venous Blood Gas:  02-13 @ 17:08  7.41/44/27/28/39  VBG Lactate: 5.0    Serum Pro-Brain Natriuretic Peptide: 2480 pg/mL (02-13 @ 17:08)    CARDIAC MARKERS ( 13 Feb 2018 17:08 )  x     / <0.01 ng/mL / x     / x     / x        MICROBIOLOGY:     Rapid Respiratory Viral Panel (02.13.18 @ 17:08)    Rapid RVP Result: NotDete: The FilmArray RVP Rapid uses polymerase chain reaction (PCR) and melt  curve analysis to screen for adenovirus; coronavirus HKU1, NL63, 229E,  OC43; human metapneumovirus (hMPV); human enterovirus/rhinovirus  (Entero/RV); influenza A; influenza A/H1;influenza A/H3; influenza  A/H1-2009; influenza B; parainfluenza viruses 1, 2, 3, 4; respiratory  syncytial virus; Bordetella pertussis; Mycoplasma pneumoniae; and  Chlamydophila pneumoniae.    RADIOLOGY:  [x ] Chest radiographs reviewed and interpreted by me    < from: Xray Chest 1 View- PORTABLE-Urgent (02.13.18 @ 16:56) >    EXAM:  XR CHEST PORTABLE URGENT 1V                            PROCEDURE DATE:  02/13/2018      INTERPRETATION:  CLINICAL INFORMATION: Shortness of breath for 5 days.    COMPARISON:  No similar studies available for comparison.    TECHNIQUE:AP portable chest xray.    FINDINGS:   Left chest wall Mediport with tip in the SVC.  Surgical clips are seen in the right axilla.    Small left pleural effusion with associated atelectasis.  The right lung is clear.  No evidence of pneumothorax.  Degenerative changes of spine are noted.  The cardiac silhouette is enlarged.    IMPRESSION:  Small left pleural effusion with associated atelectasis.    KASSANDRA MENDEZ M.D., RADIOLOGY RESIDENT  This document has been electronically signed.  HIRAL BAILON M.D., ATTENDING RADIOLOGIST  This document has been electronically signed. Feb 14 2018  9:23AM      < end of copied text >  ---------------------------------------------------------------------------------------------------------    < from: NM Pulmonary Ventilation/Perfusion Scan (02.14.18 @ 10:56) >    EXAM:  NM PULM VENTILATION PERFUS IMG                            PROCEDURE DATE:  02/14/2018          INTERPRETATION:  RADIOPHARMACEUTICAL: 1 mCi Tc-99m-DTPA aerosol by   inhalation; 6 mCi Tc-99m-MAA, I.V.    CLINICAL STATEMENT: 72-year-old female with shortness of breath and   hypoxia.    TECHNIQUE:  Ventilation and perfusion images of the lungs were obtained   following administration of Tc-99m-DTPA and Tc-99m-MAA. Images were   obtained in the anterior, posterior, both lateral, and all 4oblique   projections. The study was interpreted in conjunction with chest   radiograph of 2/13/2018.    FINDINGS: There is heterogeneous tracer distribution in the lungs, worse   on the ventilation images. There are no discrete segmental perfusion   defects.    IMPRESSION: Abnormal ventilation/perfusion lung scan.    Very low probability of pulmonary embolus.    YANELY TILLEY M.D., NUCLEAR MEDICINE ATTENDING  This document has been electronically signed. Feb 14 2018 11:19AM      < end of copied text >  ---------------------------------------------------------------------------------------------------------    < from: CT Abdomen and Pelvis No Cont (02.13.18 @ 20:02) >    EXAM:  CT ABDOMEN AND PELVIS                            PROCEDURE DATE:  02/13/2018            INTERPRETATION:  CLINICAL INFORMATION: Elevated bilirubin. History of   breast cancer. Shortness of breath.    COMPARISON: None.    PROCEDURE:   CT of the Abdomen and Pelvis was performed without intravenous contrast.   Intravenous contrast: None.  Oral contrast: None.  Sagittal and coronal reformats were performed.    FINDINGS:    LOWER CHEST: Small left pleural effusion. Left basilar consolidation may   represent atelectasis or pneumonia. Coronary artery calcifications. Right   mastectomy.    LIVER: Nodular hepatic contour. Multiple bilobar ill-defined hypodense   hepatic lesions compatible with metastatic disease. Two lesions in the   right hepatic lobe demonstrate peripheral calcification.  BILE DUCTS: Normal caliber.  GALLBLADDER: Collapsed and therefore not well evaluated.  SPLEEN: Within normal limits.  PANCREAS: Within normal limits.  ADRENALS: Within normal limits.  KIDNEYS/URETERS: Subcentimeter hyperdense left renal focus too small to   characterize.    BLADDER: Within normal limits.  REPRODUCTIVE ORGANS: Anterior fundal fibroid.    BOWEL: Diverticulosis without diverticulitis. No bowel obstruction.   Appendix is not identified.  PERITONEUM: Moderate volume ascites.  VESSELS:  Atheromatous disease of the aorta.  RETROPERITONEUM: No lymphadenopathy.    ABDOMINAL WALL: Within normal limits.  BONES: Sclerotic osseous metastases in multiple vertebral bodies,   sternum, and ribs.    IMPRESSION:   Bilobar hepatic metastases.  Sclerotic osseous metastases.  Small left pleural effusion. Left basilar consolidation may represent   atelectasis or pneumonia.       ANAMARIA MORRIS M.D., RADIOLOGY RESIDENT  This document has been electronically signed.  MAGDY KEN M.D., ATTENDING RADIOLOGIST  This document has been electronically signed. Feb 13 2018  8:27PM      < end of copied text >  --------------------------------------------------------------------------------------------------------- [facility] - [facility]  CONSULT NOTE    HPI: 72 year old gentlewoman, remote smoker, with history of HTN, DM, HLD and breast cancer metastatic to the lung, liver and bone s/p mastectomy, craniotomy for a cranial metastasis and radiation to the left chest. She is followed by Dr. Juan Nelson of our practice for a chronic cough felt to be due to radiation pneumonitis and has been treated with a course of slowly tapering prednisone as well as Breo Ellipta with marked improvement. Her spirometry is c/w moderate restrictive lung disease. The patient was doing well ~ 1 month ago able to drive and attend family functions. Over the last several weeks, the patient has developed profound fatigue associated with worsening mental status and slurred speech. She has developed severe lower extremity swelling making ambulation difficult especially in the setting of a mildly unsteady gait. She has developed shortness of breath with minimal exertion without chest congestion or wheeze. No fevers, chills or sweats. No chest pain/pressure or palpitations. No URI/viral type symptoms.    PMHX:  Breast cancer - metastatic   Hyperlipemia  Hypertension  Diabetes mellitus  Depression  Tremor    PSHX:  Portacath in place  S/P left cataract extraction  H/O oral surgery  H/O craniotomy for cranial met resection  H/O mastectomy, right      FAMILY HISTORY:  Family history of breast cancer (Father)    SOCIAL HISTORY: remote smoker; ; lives with     Pulmonary Medications:       Antimicrobials:      Cardiology:  furosemide   Injectable 40 milliGRAM(s) IV Push daily  propranolol 10 milliGRAM(s) Oral daily      Other:  dextrose 5%. 1000 milliLiter(s) IV Continuous <Continuous>  dextrose 50% Injectable 12.5 Gram(s) IV Push once  dextrose 50% Injectable 25 Gram(s) IV Push once  dextrose 50% Injectable 25 Gram(s) IV Push once  dextrose Gel 1 Dose(s) Oral once PRN  escitalopram 20 milliGRAM(s) Oral daily  glucagon  Injectable 1 milliGRAM(s) IntraMuscular once PRN  insulin glargine Injectable (LANTUS) 32 Unit(s) SubCutaneous at bedtime  insulin lispro (HumaLOG) corrective regimen sliding scale   SubCutaneous three times a day before meals  insulin lispro (HumaLOG) corrective regimen sliding scale   SubCutaneous at bedtime  pantoprazole    Tablet 40 milliGRAM(s) Oral before breakfast  predniSONE   Tablet 5 milliGRAM(s) Oral daily  simvastatin 10 milliGRAM(s) Oral at bedtime      Allergies    Levaquin (Short breath)  penicillin (Swelling)    HOME MEDICATIONS: see  H & P    REVIEW OF SYSTEMS:  Constitutional: As per HPI  HEENT: Within normal limits  CV: As per HPI  Resp: As per HPI  GI: Within normal limits   : Within normal limits  Musculoskeletal: Within normal limits  Skin: Within normal limits  Neurological: As per HPI  Psychiatric: Within normal limits  Endocrine: Within normal limits  Hematologic/Lymphatic: Within normal limits  Allergic/Immunologic: Within normal limits    [x] All other systems negative    OBJECTIVE:      I&O's Detail    13 Feb 2018 07:01  -  14 Feb 2018 07:00  --------------------------------------------------------  IN:    Oral Fluid: 150 mL  Total IN: 150 mL    OUT:    Voided: 550 mL  Total OUT: 550 mL    Total NET: -400 mL      14 Feb 2018 07:01  -  14 Feb 2018 14:28  --------------------------------------------------------  IN:    Oral Fluid: 440 mL  Total IN: 440 mL    OUT:    Voided: 400 mL  Total OUT: 400 mL    Total NET: 40 mL    Daily Height in cm: 162.56 (13 Feb 2018 22:15)      POCT Blood Glucose.: 138 mg/dL (14 Feb 2018 12:22)  POCT Blood Glucose.: 124 mg/dL (14 Feb 2018 07:49)  POCT Blood Glucose.: 165 mg/dL (13 Feb 2018 23:47)  POCT Blood Glucose.: 116 mg/dL (13 Feb 2018 16:36)      PHYSICAL EXAM:  ICU Vital Signs Last 24 Hrs  T(C): 36.2 (14 Feb 2018 12:40), Max: 36.7 (13 Feb 2018 19:13)  T(F): 97.2 (14 Feb 2018 12:40), Max: 98 (13 Feb 2018 19:13)  HR: 77 (14 Feb 2018 12:40) (68 - 92)  BP: 119/68 (14 Feb 2018 12:40) (102/56 - 158/90)  BP(mean): --  ABP: --  ABP(mean): --  RR: 18 (14 Feb 2018 12:40) (18 - 30)  SpO2: 99% (14 Feb 2018 12:40) (95% - 100%) on 2lpm    General: Awake. Alert. Sleepy. Confused. Cooperative. No distress. Appears stated age 	  HEENT:   Atraumatic. Normocephalic. Icteric sclerae. Normal oral mucosa, PERRL, EOMI  Neck: Supple. Trachea midline. Thyroid without enlargement/tenderness/nodules. No carotid bruit. No JVD	  Cardiovascular: Regular rate and rhythm. S1 S2 normal. No murmurs, rubs or gallops  Respiratory: Respirations unlabored. Clear to auscultation and percussion bilaterally  Abdomen: Soft. Non-tender. Non-distended. No organomegaly. No masses. Normal bowel sounds	  Extremities: Warm to touch. No clubbing or cyanosis. No pedal edema.  Pulses: 2+ peripheral pulses all extremities	  Skin: Normal skin color. No rashes or lesions. No ecchymoses, No cyanosis. Warm to touch  Lymph Nodes: Cervical, supraclavicular and axillary nodes normal  Neurological: Motor and sensory examination equal and normal. A and O x 3  Psychiatry: Appropriate mood and affect.      LABS:                          10.9   11.3  )-----------( 35       ( 14 Feb 2018 13:35 )             30.9                         11.6   15.2  )-----------( 43       ( 13 Feb 2018 17:08 )             35.6     02-13    136  |  94<L>  |  60<H>  ----------------------------<  79  4.4   |  26  |  1.67<H>    Ca      8.7      02-13      TPro  5.3<L>  /  Alb  2.4<L>  /  TBili  5.2<H>  /  DBili  x   /  AST  90<H>  /  ALT  48<H>  /  AlkPhos  244<H>  02-13    PT/INR - ( 13 Feb 2018 17:08 )   PT: 16.5 sec;   INR: 1.51 ratio       PTT - ( 13 Feb 2018 17:08 )  PTT:31.7 sec    Venous Blood Gas:  02-14 @ 13:36  --/--/--/--/--  VBG Lactate: 2.4    Venous Blood Gas:  02-13 @ 23:56  --/--/--/--/--  VBG Lactate: 3.6    Venous Blood Gas:  02-13 @ 18:36  7.49/36/26/27/40  VBG Lactate: 4.0    Venous Blood Gas:  02-13 @ 17:08  7.41/44/27/28/39  VBG Lactate: 5.0    Serum Pro-Brain Natriuretic Peptide: 2480 pg/mL (02-13 @ 17:08)    CARDIAC MARKERS ( 13 Feb 2018 17:08 )  x     / <0.01 ng/mL / x     / x     / x        MICROBIOLOGY:     Rapid Respiratory Viral Panel (02.13.18 @ 17:08)    Rapid RVP Result: NotDete: The FilmArray RVP Rapid uses polymerase chain reaction (PCR) and melt  curve analysis to screen for adenovirus; coronavirus HKU1, NL63, 229E,  OC43; human metapneumovirus (hMPV); human enterovirus/rhinovirus  (Entero/RV); influenza A; influenza A/H1;influenza A/H3; influenza  A/H1-2009; influenza B; parainfluenza viruses 1, 2, 3, 4; respiratory  syncytial virus; Bordetella pertussis; Mycoplasma pneumoniae; and  Chlamydophila pneumoniae.    RADIOLOGY:  [x ] Chest radiographs reviewed and interpreted by me    < from: Xray Chest 1 View- PORTABLE-Urgent (02.13.18 @ 16:56) >    EXAM:  XR CHEST PORTABLE URGENT 1V                            PROCEDURE DATE:  02/13/2018      INTERPRETATION:  CLINICAL INFORMATION: Shortness of breath for 5 days.    COMPARISON:  No similar studies available for comparison.    TECHNIQUE:AP portable chest xray.    FINDINGS:   Left chest wall Mediport with tip in the SVC.  Surgical clips are seen in the right axilla.    Small left pleural effusion with associated atelectasis.  The right lung is clear.  No evidence of pneumothorax.  Degenerative changes of spine are noted.  The cardiac silhouette is enlarged.    IMPRESSION:  Small left pleural effusion with associated atelectasis.    KASSANDRA MENDEZ M.D., RADIOLOGY RESIDENT  This document has been electronically signed.  HIRAL BAILON M.D., ATTENDING RADIOLOGIST  This document has been electronically signed. Feb 14 2018  9:23AM      < end of copied text >  ---------------------------------------------------------------------------------------------------------    < from: NM Pulmonary Ventilation/Perfusion Scan (02.14.18 @ 10:56) >    EXAM:  NM PULM VENTILATION PERFUS IMG                            PROCEDURE DATE:  02/14/2018          INTERPRETATION:  RADIOPHARMACEUTICAL: 1 mCi Tc-99m-DTPA aerosol by   inhalation; 6 mCi Tc-99m-MAA, I.V.    CLINICAL STATEMENT: 72-year-old female with shortness of breath and   hypoxia.    TECHNIQUE:  Ventilation and perfusion images of the lungs were obtained   following administration of Tc-99m-DTPA and Tc-99m-MAA. Images were   obtained in the anterior, posterior, both lateral, and all 4oblique   projections. The study was interpreted in conjunction with chest   radiograph of 2/13/2018.    FINDINGS: There is heterogeneous tracer distribution in the lungs, worse   on the ventilation images. There are no discrete segmental perfusion   defects.    IMPRESSION: Abnormal ventilation/perfusion lung scan.    Very low probability of pulmonary embolus.    YANELY TILLEY M.D., NUCLEAR MEDICINE ATTENDING  This document has been electronically signed. Feb 14 2018 11:19AM      < end of copied text >  ---------------------------------------------------------------------------------------------------------    < from: CT Abdomen and Pelvis No Cont (02.13.18 @ 20:02) >    EXAM:  CT ABDOMEN AND PELVIS                            PROCEDURE DATE:  02/13/2018            INTERPRETATION:  CLINICAL INFORMATION: Elevated bilirubin. History of   breast cancer. Shortness of breath.    COMPARISON: None.    PROCEDURE:   CT of the Abdomen and Pelvis was performed without intravenous contrast.   Intravenous contrast: None.  Oral contrast: None.  Sagittal and coronal reformats were performed.    FINDINGS:    LOWER CHEST: Small left pleural effusion. Left basilar consolidation may   represent atelectasis or pneumonia. Coronary artery calcifications. Right   mastectomy.    LIVER: Nodular hepatic contour. Multiple bilobar ill-defined hypodense   hepatic lesions compatible with metastatic disease. Two lesions in the   right hepatic lobe demonstrate peripheral calcification.  BILE DUCTS: Normal caliber.  GALLBLADDER: Collapsed and therefore not well evaluated.  SPLEEN: Within normal limits.  PANCREAS: Within normal limits.  ADRENALS: Within normal limits.  KIDNEYS/URETERS: Subcentimeter hyperdense left renal focus too small to   characterize.    BLADDER: Within normal limits.  REPRODUCTIVE ORGANS: Anterior fundal fibroid.    BOWEL: Diverticulosis without diverticulitis. No bowel obstruction.   Appendix is not identified.  PERITONEUM: Moderate volume ascites.  VESSELS:  Atheromatous disease of the aorta.  RETROPERITONEUM: No lymphadenopathy.    ABDOMINAL WALL: Within normal limits.  BONES: Sclerotic osseous metastases in multiple vertebral bodies,   sternum, and ribs.    IMPRESSION:   Bilobar hepatic metastases.  Sclerotic osseous metastases.  Small left pleural effusion. Left basilar consolidation may represent   atelectasis or pneumonia.       ANAMARIA MORRIS M.D., RADIOLOGY RESIDENT  This document has been electronically signed.  MAGDY KEN M.D., ATTENDING RADIOLOGIST  This document has been electronically signed. Feb 13 2018  8:27PM      < end of copied text >  --------------------------------------------------------------------------------------------------------- [facility] - [facility] [place]  CONSULT NOTE    HPI: 72 year old gentlewoman, remote smoker, with history of HTN, DM, HLD and breast cancer metastatic to the lung, liver and bone s/p mastectomy, craniotomy for a cranial metastasis and radiation to the left chest. She is followed by Dr. Juan Nelson of our practice for a chronic cough felt to be due to radiation pneumonitis and has been treated with a course of slowly tapering prednisone as well as Breo Ellipta with marked improvement. Her spirometry is c/w moderate restrictive lung disease. The patient was doing well ~ 1 month ago able to drive and attend family functions. Over the last several weeks, the patient has developed profound fatigue associated with worsening mental status and slurred speech. She has developed severe lower extremity swelling making ambulation difficult especially in the setting of a mildly unsteady gait. She has developed shortness of breath with minimal exertion without chest congestion or wheeze. No fevers, chills or sweats. No chest pain/pressure or palpitations. No URI/viral type symptoms.    PMHX:  Breast cancer - metastatic   Hyperlipemia  Hypertension  Diabetes mellitus  Depression  Tremor    PSHX:  Portacath in place  S/P left cataract extraction  H/O oral surgery  H/O craniotomy for cranial met resection  H/O mastectomy, right      FAMILY HISTORY:  Family history of breast cancer (Father)    SOCIAL HISTORY: remote smoker; ; lives with     Pulmonary Medications:       Antimicrobials:      Cardiology:  furosemide   Injectable 40 milliGRAM(s) IV Push daily  propranolol 10 milliGRAM(s) Oral daily      Other:  dextrose 5%. 1000 milliLiter(s) IV Continuous <Continuous>  dextrose 50% Injectable 12.5 Gram(s) IV Push once  dextrose 50% Injectable 25 Gram(s) IV Push once  dextrose 50% Injectable 25 Gram(s) IV Push once  dextrose Gel 1 Dose(s) Oral once PRN  escitalopram 20 milliGRAM(s) Oral daily  glucagon  Injectable 1 milliGRAM(s) IntraMuscular once PRN  insulin glargine Injectable (LANTUS) 32 Unit(s) SubCutaneous at bedtime  insulin lispro (HumaLOG) corrective regimen sliding scale   SubCutaneous three times a day before meals  insulin lispro (HumaLOG) corrective regimen sliding scale   SubCutaneous at bedtime  pantoprazole    Tablet 40 milliGRAM(s) Oral before breakfast  predniSONE   Tablet 5 milliGRAM(s) Oral daily  simvastatin 10 milliGRAM(s) Oral at bedtime      Allergies    Levaquin (Short breath)  penicillin (Swelling)    HOME MEDICATIONS: see  H & P    REVIEW OF SYSTEMS:  Constitutional: As per HPI  HEENT: Within normal limits  CV: As per HPI  Resp: As per HPI  GI: Within normal limits   : Within normal limits  Musculoskeletal: Within normal limits  Skin: Within normal limits  Neurological: As per HPI  Psychiatric: Within normal limits  Endocrine: Within normal limits  Hematologic/Lymphatic: Within normal limits  Allergic/Immunologic: Within normal limits    [x] All other systems negative    OBJECTIVE:      I&O's Detail    13 Feb 2018 07:01  -  14 Feb 2018 07:00  --------------------------------------------------------  IN:    Oral Fluid: 150 mL  Total IN: 150 mL    OUT:    Voided: 550 mL  Total OUT: 550 mL    Total NET: -400 mL      14 Feb 2018 07:01  -  14 Feb 2018 14:28  --------------------------------------------------------  IN:    Oral Fluid: 440 mL  Total IN: 440 mL    OUT:    Voided: 400 mL  Total OUT: 400 mL    Total NET: 40 mL    Daily Height in cm: 162.56 (13 Feb 2018 22:15)      POCT Blood Glucose.: 138 mg/dL (14 Feb 2018 12:22)  POCT Blood Glucose.: 124 mg/dL (14 Feb 2018 07:49)  POCT Blood Glucose.: 165 mg/dL (13 Feb 2018 23:47)  POCT Blood Glucose.: 116 mg/dL (13 Feb 2018 16:36)      PHYSICAL EXAM:  ICU Vital Signs Last 24 Hrs  T(C): 36.2 (14 Feb 2018 12:40), Max: 36.7 (13 Feb 2018 19:13)  T(F): 97.2 (14 Feb 2018 12:40), Max: 98 (13 Feb 2018 19:13)  HR: 77 (14 Feb 2018 12:40) (68 - 92)  BP: 119/68 (14 Feb 2018 12:40) (102/56 - 158/90)  BP(mean): --  ABP: --  ABP(mean): --  RR: 18 (14 Feb 2018 12:40) (18 - 30)  SpO2: 99% (14 Feb 2018 12:40) (95% - 100%) on 2lpm    General: Awake. Alert. Sleepy. Confused. Cooperative. No distress. Appears stated age 	  HEENT:   Atraumatic. Normocephalic. Icteric sclerae. Normal oral mucosa, PERRL, EOMI  Neck: Supple. Trachea midline. Thyroid without enlargement/tenderness/nodules. No carotid bruit. No JVD	  Cardiovascular: Regular rate and rhythm. S1 S2 normal. No murmurs, rubs or gallops  Respiratory: Respirations unlabored. Clear to auscultation and percussion bilaterally  Abdomen: Soft. Non-tender. Non-distended. No organomegaly. No masses. Normal bowel sounds	  Extremities: Warm to touch. No clubbing or cyanosis. No pedal edema.  Pulses: 2+ peripheral pulses all extremities	  Skin: Normal skin color. No rashes or lesions. No ecchymoses, No cyanosis. Warm to touch  Lymph Nodes: Cervical, supraclavicular and axillary nodes normal  Neurological: Motor and sensory examination equal and normal. A and O x 3  Psychiatry: Appropriate mood and affect.      LABS:                          10.9   11.3  )-----------( 35       ( 14 Feb 2018 13:35 )             30.9                         11.6   15.2  )-----------( 43       ( 13 Feb 2018 17:08 )             35.6     02-13    136  |  94<L>  |  60<H>  ----------------------------<  79  4.4   |  26  |  1.67<H>    Ca      8.7      02-13      TPro  5.3<L>  /  Alb  2.4<L>  /  TBili  5.2<H>  /  DBili  x   /  AST  90<H>  /  ALT  48<H>  /  AlkPhos  244<H>  02-13    PT/INR - ( 13 Feb 2018 17:08 )   PT: 16.5 sec;   INR: 1.51 ratio       PTT - ( 13 Feb 2018 17:08 )  PTT:31.7 sec    Venous Blood Gas:  02-14 @ 13:36  --/--/--/--/--  VBG Lactate: 2.4    Venous Blood Gas:  02-13 @ 23:56  --/--/--/--/--  VBG Lactate: 3.6    Venous Blood Gas:  02-13 @ 18:36  7.49/36/26/27/40  VBG Lactate: 4.0    Venous Blood Gas:  02-13 @ 17:08  7.41/44/27/28/39  VBG Lactate: 5.0    Serum Pro-Brain Natriuretic Peptide: 2480 pg/mL (02-13 @ 17:08)    CARDIAC MARKERS ( 13 Feb 2018 17:08 )  x     / <0.01 ng/mL / x     / x     / x      < from: Transthoracic Echocardiogram (02.14.18 @ 08:59) >    Patient name: STELLA SIMMONS  YOB: 1945   Age: 72 (F)   MR#: [id number]  Study Date: 2/14/2018  Location: [address] [place], [place] [id number]: [facility]  Study quality: Technically difficult  Referring Physician: Rick Jacobo MD  Blood Pressure: 124/72 mmHg  Height: 163 cm  Weight: 93 kg  BSA: 2 m2  ------------------------------------------------------------------------  PROCEDURE: Transthoracic echocardiogram with 2-D, M-Mode  and complete spectral and color flow Doppler.  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 Mild mitral  regurgitation.  Aortic Valve/Aorta: Calcified trileaflet aortic valve with  normal opening. Mild aortic regurgitation.  Normal aortic root size. (Ao: 2.9 cm at the sinuses of  Valsalva).  Left Atrium: Normal left atrium.  LA volume index = 26  cc/m2.  Left Ventricle: Normal left ventricular systolic function.  No segmental wall motion abnormalities. Normal left  ventricular internal dimensions and wall thicknesses.  Right Heart: Normal right atrium. The right ventricle is  not well visualized; grossly normal right ventricular  systolic function. Normal tricuspid valve. Mild-moderate  tricuspid regurgitation. Pulmonic valve not well  visualized, probably normal. Minimal pulmonic  regurgitation.  Pericardium/Pleura: No pericardial effusion seen. Bilateral  pleural effusions. Ascites seen.  Hemodynamic: Estimated right atrial pressure is 8 mm Hg.  Estimated right ventricular systolic pressure equals 42 mm  Hg, assuming right atrial pressure equals 8 mm Hg,  consistent with mild pulmonary hypertension.  ------------------------------------------------------------------------  Conclusions:  1. Calcified trileaflet aortic valve with normalopening.  Mild aortic regurgitation.  2. Normal left ventricular systolic function. No segmental  wall motion abnormalities.  3. The right ventricle is not well visualized; grossly  normal right ventricular systolic function.  4. No pericardial effusion seen. Bilateral pleural  effusions. Ascites seen.  *** No previous Echo exam.  ------------------------------------------------------------------------  Confirmed on  2/14/2018 - 15:24:26 by Petey Cortes M.D.  ------------------------------------------------------------------------    < end of copied text >  ---------------------------------------------------------------------------------------------------------    MICROBIOLOGY:     Rapid Respiratory Viral Panel (02.13.18 @ 17:08)    Rapid RVP Result: NotDete: The Film[employer] RVP Rapid uses polymerase chain reaction (PCR) and melt  curve analysis to screen for adenovirus; coronavirus HKU1, NL63, 229E,  OC43; human metapneumovirus (hMPV); human enterovirus/rhinovirus  (Entero/RV); influenza A; influenza A/H1;influenza A/H3; influenza  A/H1-2009; influenza B; parainfluenza viruses 1, 2, 3, 4; respiratory  syncytial virus; Bordetella pertussis; Mycoplasma pneumoniae; and  Chlamydophila pneumoniae.    RADIOLOGY:  [x ] Chest radiographs reviewed and interpreted by me    < from: Xray Chest 1 View- PORTABLE-Urgent (02.13.18 @ 16:56) >    EXAM:  XR CHEST PORTABLE URGENT 1V                            PROCEDURE DATE:  02/13/2018      INTERPRETATION:  CLINICAL INFORMATION: Shortness of breath for 5 days.    COMPARISON:  No similar studies available for comparison.    TECHNIQUE:AP portable chest xray.    FINDINGS:   Left chest wall Mediport with tip in the SVC.  Surgical clips are seen in the right axilla.    Small left pleural effusion with associated atelectasis.  The right lung is clear.  No evidence of pneumothorax.  Degenerative changes of spine are noted.  The cardiac silhouette is enlarged.    IMPRESSION:  Small left pleural effusion with associated atelectasis.    KASSANDRA MENDEZ M.D., RADIOLOGY RESIDENT  This document has been electronically signed.  HIRAL BAILON M.D., ATTENDING RADIOLOGIST  This document has been electronically signed. Feb 14 2018  9:23AM      < end of copied text >  ---------------------------------------------------------------------------------------------------------    < from: NM Pulmonary Ventilation/Perfusion Scan (02.14.18 @ 10:56) >    EXAM:  NM PULM VENTILATION PERFUS IMG                            PROCEDURE DATE:  02/14/2018          INTERPRETATION:  RADIOPHARMACEUTICAL: 1 mCi Tc-99m-DTPA aerosol by   inhalation; 6 mCi Tc-99m-MAA, I.V.    CLINICAL STATEMENT: 72-year-old female with shortness of breath and   hypoxia.    TECHNIQUE:  Ventilation and perfusion images of the lungs were obtained   following administration of Tc-99m-DTPA and Tc-99m-MAA. Images were   obtained in the anterior, posterior, both lateral, and all 4oblique   projections. The study was interpreted in conjunction with chest   radiograph of 2/13/2018.    FINDINGS: There is heterogeneous tracer distribution in the lungs, worse   on the ventilation images. There are no discrete segmental perfusion   defects.    IMPRESSION: Abnormal ventilation/perfusion lung scan.    Very low probability of pulmonary embolus.    YANELY TILLEY M.D., NUCLEAR MEDICINE ATTENDING  This document has been electronically signed. Feb 14 2018 11:19AM      < end of copied text >  ---------------------------------------------------------------------------------------------------------    < from: CT Abdomen and Pelvis No Cont (02.13.18 @ 20:02) >    EXAM:  CT ABDOMEN AND PELVIS                            PROCEDURE DATE:  02/13/2018            INTERPRETATION:  CLINICAL INFORMATION: Elevated bilirubin. History of   breast cancer. Shortness of breath.    COMPARISON: None.    PROCEDURE:   CT of the Abdomen and Pelvis was performed without intravenous contrast.   Intravenous contrast: None.  Oral contrast: None.  Sagittal and coronal reformats were performed.    FINDINGS:    LOWER CHEST: Small left pleural effusion. Left basilar consolidation may   represent atelectasis or pneumonia. Coronary artery calcifications. Right   mastectomy.    LIVER: Nodular hepatic contour. Multiple bilobar ill-defined hypodense   hepatic lesions compatible with metastatic disease. Two lesions in the   right hepatic lobe demonstrate peripheral calcification.  BILE DUCTS: Normal caliber.  GALLBLADDER: Collapsed and therefore not well evaluated.  SPLEEN: Within normal limits.  PANCREAS: Within normal limits.  ADRENALS: Within normal limits.  KIDNEYS/URETERS: Subcentimeter hyperdense left renal focus too small to   characterize.    BLADDER: Within normal limits.  REPRODUCTIVE ORGANS: Anterior fundal fibroid.    BOWEL: Diverticulosis without diverticulitis. No bowel obstruction.   Appendix is not identified.  PERITONEUM: Moderate volume ascites.  VESSELS:  Atheromatous disease of the aorta.  RETROPERITONEUM: No lymphadenopathy.    ABDOMINAL WALL: Within normal limits.  BONES: Sclerotic osseous metastases in multiple vertebral bodies,   sternum, and ribs.    IMPRESSION:   Bilobar hepatic metastases.  Sclerotic osseous metastases.  Small left pleural effusion. Left basilar consolidation may represent   atelectasis or pneumonia.       ANAMARIA MORRIS M.D., RADIOLOGY RESIDENT  This document has been electronically signed.  MAGDY KEN M.D., ATTENDING RADIOLOGIST  This document has been electronically signed. Feb 13 2018  8:27PM      < end of copied text >  ---------------------------------------------------------------------------------------------------------    < from: US Abdomen Doppler (02.14.18 @ 15:18) >    EXAM:  DUPLEX SCAN EXT VEINS LOWER BI                          EXAM:  US DPLX ABDOMEN                            PROCEDURE DATE:  02/14/2018            INTERPRETATION:  CLINICAL INFORMATION: Increasing liver function tests.   History of metastatic liver disease. Evaluation for portal vein   thrombosis.     EXAM: Focused grayscale, color, and spectral Doppler ultrasound imaging   was performed of the splenic, portal, and hepatic veins.    COMPARISON: CT abdomen pelvis dated 2/13/2018.    FINDINGS:    There is redemonstration of partially imaged metastatic liver disease,   nodular liver contour, and ascites.    The splenic, portal, hepatic veins are patent. There is no evidence of   thrombosis. The splenic and portal veins demonstrate hepatopedal flow.   Main portal vein measures 1.2 cm. The main portal vein has a peak   systolic velocity of 20 cm/s with phasic blood flow. The right and left   portal veins are patent.    Peak systolic velocity in the hepatic artery is 116.7 cm/s.    The intrahepatic IVC is patent.    IMPRESSION:    No evidence of splenic, portal, or hepatic vein thrombosis.    MILTON LAGOS M.D., RADIOLOGY RESIDENT  This document has been electronically signed.  SOFI REYEZ M.D., ATTENDING RADIOLOGIST  This document has been electronically signed. Feb 14 2018  1:00PM      < end of copied text >  --------------------------------------------------------------------------------------------------------- [facility] - [facility]  CONSULT NOTE    HPI: 72 year old gentlewoman, remote smoker, with history of HTN, DM, HLD and breast cancer metastatic to the lung, liver and bone s/p mastectomy, craniotomy for a cranial metastasis and radiation to the left chest. She is followed by Dr. Juan Nelson of our practice for a chronic cough felt to be due to radiation pneumonitis and has been treated with a course of slowly tapering prednisone as well as Breo Ellipta with marked improvement. Her spirometry is c/w moderate restrictive lung disease. The patient was doing well ~ 1 month ago able to drive and attend family functions. Over the last several weeks, the patient has developed profound fatigue associated with worsening mental status and slurred speech. She has developed severe lower extremity swelling making ambulation difficult especially in the setting of a mildly unsteady gait. She has developed shortness of breath with minimal exertion without chest congestion or wheeze. No fevers, chills or sweats. No chest pain/pressure or palpitations. No URI/viral type symptoms.    PMHX:  Breast cancer - metastatic   Hyperlipemia  Hypertension  Diabetes mellitus  Depression  Tremor    PSHX:  Portacath in place  S/P left cataract extraction  H/O oral surgery  H/O craniotomy for cranial met resection  H/O mastectomy, right      FAMILY HISTORY:  Family history of breast cancer (Father)    SOCIAL HISTORY: remote smoker; ; lives with     Pulmonary Medications:       Antimicrobials:      Cardiology:  furosemide   Injectable 40 milliGRAM(s) IV Push daily  propranolol 10 milliGRAM(s) Oral daily      Other:  dextrose 5%. 1000 milliLiter(s) IV Continuous <Continuous>  dextrose 50% Injectable 12.5 Gram(s) IV Push once  dextrose 50% Injectable 25 Gram(s) IV Push once  dextrose 50% Injectable 25 Gram(s) IV Push once  dextrose Gel 1 Dose(s) Oral once PRN  escitalopram 20 milliGRAM(s) Oral daily  glucagon  Injectable 1 milliGRAM(s) IntraMuscular once PRN  insulin glargine Injectable (LANTUS) 32 Unit(s) SubCutaneous at bedtime  insulin lispro (HumaLOG) corrective regimen sliding scale   SubCutaneous three times a day before meals  insulin lispro (HumaLOG) corrective regimen sliding scale   SubCutaneous at bedtime  pantoprazole    Tablet 40 milliGRAM(s) Oral before breakfast  predniSONE   Tablet 5 milliGRAM(s) Oral daily  simvastatin 10 milliGRAM(s) Oral at bedtime      Allergies    Levaquin (Short breath)  penicillin (Swelling)    HOME MEDICATIONS: see  H & P    REVIEW OF SYSTEMS:  Constitutional: As per HPI  HEENT: Within normal limits  CV: As per HPI  Resp: As per HPI  GI: Within normal limits   : Within normal limits  Musculoskeletal: Within normal limits  Skin: Within normal limits  Neurological: As per HPI  Psychiatric: Within normal limits  Endocrine: Within normal limits  Hematologic/Lymphatic: Within normal limits  Allergic/Immunologic: Within normal limits    [x] All other systems negative    OBJECTIVE:      I&O's Detail    13 Feb 2018 07:01  -  14 Feb 2018 07:00  --------------------------------------------------------  IN:    Oral Fluid: 150 mL  Total IN: 150 mL    OUT:    Voided: 550 mL  Total OUT: 550 mL    Total NET: -400 mL      14 Feb 2018 07:01  -  14 Feb 2018 14:28  --------------------------------------------------------  IN:    Oral Fluid: 440 mL  Total IN: 440 mL    OUT:    Voided: 400 mL  Total OUT: 400 mL    Total NET: 40 mL    Daily Height in cm: 162.56 (13 Feb 2018 22:15)      POCT Blood Glucose.: 138 mg/dL (14 Feb 2018 12:22)  POCT Blood Glucose.: 124 mg/dL (14 Feb 2018 07:49)  POCT Blood Glucose.: 165 mg/dL (13 Feb 2018 23:47)  POCT Blood Glucose.: 116 mg/dL (13 Feb 2018 16:36)      PHYSICAL EXAM:  ICU Vital Signs Last 24 Hrs  T(C): 36.2 (14 Feb 2018 12:40), Max: 36.7 (13 Feb 2018 19:13)  T(F): 97.2 (14 Feb 2018 12:40), Max: 98 (13 Feb 2018 19:13)  HR: 77 (14 Feb 2018 12:40) (68 - 92)  BP: 119/68 (14 Feb 2018 12:40) (102/56 - 158/90)  BP(mean): --  ABP: --  ABP(mean): --  RR: 18 (14 Feb 2018 12:40) (18 - 30)  SpO2: 99% (14 Feb 2018 12:40) (95% - 100%) on 2lpm    General: Awake. Alert. Sleepy. Confused. Cooperative. No distress. Appears stated age 	  HEENT:   Atraumatic. Normocephalic. Icteric sclerae. Normal oral mucosa, PERRL, EOMI  Neck: Supple. Trachea midline. Thyroid without enlargement/tenderness/nodules. No carotid bruit. No JVD	  Cardiovascular: Regular rate and rhythm. S1 S2 normal. No murmurs, rubs or gallops  Respiratory: Respirations unlabored. Clear to auscultation and percussion bilaterally  Abdomen: Soft. Non-tender. Non-distended. No organomegaly. No masses. Normal bowel sounds	  Extremities: Warm to touch. No clubbing or cyanosis. Marked bilateral lower extremity edema  Pulses: 2+ peripheral pulses all extremities	  Skin: Normal skin color. No rashes or lesions. No ecchymoses, No cyanosis. Warm to touch  Lymph Nodes: Cervical, supraclavicular and axillary nodes normal  Neurological: Motor and sensory examination equal and normal. A and O x 3  Psychiatry: Lethargic      LABS:                          10.9   11.3  )-----------( 35       ( 14 Feb 2018 13:35 )             30.9                         11.6   15.2  )-----------( 43       ( 13 Feb 2018 17:08 )             35.6     02-13    136  |  94<L>  |  60<H>  ----------------------------<  79  4.4   |  26  |  1.67<H>    Ca      8.7      02-13      TPro  5.3<L>  /  Alb  2.4<L>  /  TBili  5.2<H>  /  DBili  x   /  AST  90<H>  /  ALT  48<H>  /  AlkPhos  244<H>  02-13    PT/INR - ( 13 Feb 2018 17:08 )   PT: 16.5 sec;   INR: 1.51 ratio       PTT - ( 13 Feb 2018 17:08 )  PTT:31.7 sec    Venous Blood Gas:  02-14 @ 13:36  --/--/--/--/--  VBG Lactate: 2.4    Venous Blood Gas:  02-13 @ 23:56  --/--/--/--/--  VBG Lactate: 3.6    Venous Blood Gas:  02-13 @ 18:36  7.49/36/26/27/40  VBG Lactate: 4.0    Venous Blood Gas:  02-13 @ 17:08  7.41/44/27/28/39  VBG Lactate: 5.0    Serum Pro-Brain Natriuretic Peptide: 2480 pg/mL (02-13 @ 17:08)    CARDIAC MARKERS ( 13 Feb 2018 17:08 )  x     / <0.01 ng/mL / x     / x     / x      < from: Transthoracic Echocardiogram (02.14.18 @ 08:59) >    Patient name: STELLA SIMMONS  YOB: 1945   Age: 72 (F)   MR#: [id number]  Study Date: 2/14/2018  Location: [address] [place], [place] [id number]: Isabel Patel [facility]  Study quality: Technically difficult  Referring Physician: Rick Jacobo MD  Blood Pressure: 124/72 mmHg  Height: 163 cm  Weight: 93 kg  BSA: 2 m2  ------------------------------------------------------------------------  PROCEDURE: Transthoracic echocardiogram with 2-D, M-Mode  and complete spectral and color flow Doppler.  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 Mild mitral  regurgitation.  Aortic Valve/Aorta: Calcified trileaflet aortic valve with  normal opening. Mild aortic regurgitation.  Normal aortic root size. (Ao: 2.9 cm at the sinuses of  Valsalva).  Left Atrium: Normal left atrium.  LA volume index = 26  cc/m2.  Left Ventricle: Normal left ventricular systolic function.  No segmental wall motion abnormalities. Normal left  ventricular internal dimensions and wall thicknesses.  Right Heart: Normal right atrium. The right ventricle is  not well visualized; grossly normal right ventricular  systolic function. Normal tricuspid valve. Mild-moderate  tricuspid regurgitation. Pulmonic valve not well  visualized, probably normal. Minimal pulmonic  regurgitation.  Pericardium/Pleura: No pericardial effusion seen. Bilateral  pleural effusions. Ascites seen.  Hemodynamic: Estimated right atrial pressure is 8 mm Hg.  Estimated right ventricular systolic pressure equals 42 mm  Hg, assuming right atrial pressure equals 8 mm Hg,  consistent with mild pulmonary hypertension.  ------------------------------------------------------------------------  Conclusions:  1. Calcified trileaflet aortic valve with normalopening.  Mild aortic regurgitation.  2. Normal left ventricular systolic function. No segmental  wall motion abnormalities.  3. The right ventricle is not well visualized; grossly  normal right ventricular systolic function.  4. No pericardial effusion seen. Bilateral pleural  effusions. Ascites seen.  *** No previous Echo exam.  ------------------------------------------------------------------------  Confirmed on  2/14/2018 - 15:24:26 by Petey Cortes M.D.  ------------------------------------------------------------------------    < end of copied text >  ---------------------------------------------------------------------------------------------------------    MICROBIOLOGY:     Rapid Respiratory Viral Panel (02.13.18 @ 17:08)    Rapid RVP Result: [facility]: The [employer] RVP Rapid uses polymerase chain reaction (PCR) and melt  curve analysis to screen for adenovirus; coronavirus HKU1, NL63, 229E,  OC43; human metapneumovirus (hMPV); human enterovirus/rhinovirus  (Entero/RV); influenza A; influenza A/H1;influenza A/H3; influenza  A/H1-2009; influenza B; parainfluenza viruses 1, 2, 3, 4; respiratory  syncytial virus; Bordetella pertussis; Mycoplasma pneumoniae; and  Chlamydophila pneumoniae.    RADIOLOGY:  [x ] Chest radiographs reviewed and interpreted by me    < from: Xray Chest 1 View- PORTABLE-Urgent (02.13.18 @ 16:56) >    EXAM:  XR CHEST PORTABLE URGENT 1V                            PROCEDURE DATE:  02/13/2018      INTERPRETATION:  CLINICAL INFORMATION: Shortness of breath for 5 days.    COMPARISON:  No similar studies available for comparison.    TECHNIQUE:AP portable chest xray.    FINDINGS:   Left chest wall Mediport with tip in the SVC.  Surgical clips are seen in the right axilla.    Small left pleural effusion with associated atelectasis.  The right lung is clear.  No evidence of pneumothorax.  Degenerative changes of spine are noted.  The cardiac silhouette is enlarged.    IMPRESSION:  Small left pleural effusion with associated atelectasis.    KASSANDRA MENDEZ M.D., RADIOLOGY RESIDENT  This document has been electronically signed.  HIRAL BAILON M.D., ATTENDING RADIOLOGIST  This document has been electronically signed. Feb 14 2018  9:23AM      < end of copied text >  ---------------------------------------------------------------------------------------------------------    < from: NM Pulmonary Ventilation/Perfusion Scan (02.14.18 @ 10:56) >    EXAM:  NM PULM VENTILATION PERFUS IMG                            PROCEDURE DATE:  02/14/2018          INTERPRETATION:  RADIOPHARMACEUTICAL: 1 mCi Tc-99m-DTPA aerosol by   inhalation; 6 mCi Tc-99m-MAA, I.V.    CLINICAL STATEMENT: 72-year-old female with shortness of breath and   hypoxia.    TECHNIQUE:  Ventilation and perfusion images of the lungs were obtained   following administration of Tc-99m-DTPA and Tc-99m-MAA. Images were   obtained in the anterior, posterior, both lateral, and all 4oblique   projections. The study was interpreted in conjunction with chest   radiograph of 2/13/2018.    FINDINGS: There is heterogeneous tracer distribution in the lungs, worse   on the ventilation images. There are no discrete segmental perfusion   defects.    IMPRESSION: Abnormal ventilation/perfusion lung scan.    Very low probability of pulmonary embolus.    YANELY TILLEY M.D., NUCLEAR MEDICINE ATTENDING  This document has been electronically signed. Feb 14 2018 11:19AM      < end of copied text >  ---------------------------------------------------------------------------------------------------------    < from: CT Abdomen and Pelvis No Cont (02.13.18 @ 20:02) >    EXAM:  CT ABDOMEN AND PELVIS                            PROCEDURE DATE:  02/13/2018            INTERPRETATION:  CLINICAL INFORMATION: Elevated bilirubin. History of   breast cancer. Shortness of breath.    COMPARISON: None.    PROCEDURE:   CT of the Abdomen and Pelvis was performed without intravenous contrast.   Intravenous contrast: None.  Oral contrast: None.  Sagittal and coronal reformats were performed.    FINDINGS:    LOWER CHEST: Small left pleural effusion. Left basilar consolidation may   represent atelectasis or pneumonia. Coronary artery calcifications. Right   mastectomy.    LIVER: Nodular hepatic contour. Multiple bilobar ill-defined hypodense   hepatic lesions compatible with metastatic disease. Two lesions in the   right hepatic lobe demonstrate peripheral calcification.  BILE DUCTS: Normal caliber.  GALLBLADDER: Collapsed and therefore not well evaluated.  SPLEEN: Within normal limits.  PANCREAS: Within normal limits.  ADRENALS: Within normal limits.  KIDNEYS/URETERS: Subcentimeter hyperdense left renal focus too small to   characterize.    BLADDER: Within normal limits.  REPRODUCTIVE ORGANS: Anterior fundal fibroid.    BOWEL: Diverticulosis without diverticulitis. No bowel obstruction.   Appendix is not identified.  PERITONEUM: Moderate volume ascites.  VESSELS:  Atheromatous disease of the aorta.  RETROPERITONEUM: No lymphadenopathy.    ABDOMINAL WALL: Within normal limits.  BONES: Sclerotic osseous metastases in multiple vertebral bodies,   sternum, and ribs.    IMPRESSION:   Bilobar hepatic metastases.  Sclerotic osseous metastases.  Small left pleural effusion. Left basilar consolidation may represent   atelectasis or pneumonia.       ANAMARIA MORRIS M.D., RADIOLOGY RESIDENT  This document has been electronically signed.  MAGDY KEN M.D., ATTENDING RADIOLOGIST  This document has been electronically signed. Feb 13 2018  8:27PM      < end of copied text >  ---------------------------------------------------------------------------------------------------------    < from: US Abdomen Doppler (02.14.18 @ 15:18) >    EXAM:  DUPLEX SCAN EXT VEINS LOWER BI                          EXAM:  US DPLX ABDOMEN                            PROCEDURE DATE:  02/14/2018            INTERPRETATION:  CLINICAL INFORMATION: Increasing liver function tests.   History of metastatic liver disease. Evaluation for portal vein   thrombosis.     EXAM: Focused grayscale, color, and spectral Doppler ultrasound imaging   was performed of the splenic, portal, and hepatic veins.    COMPARISON: CT abdomen pelvis dated 2/13/2018.    FINDINGS:    There is redemonstration of partially imaged metastatic liver disease,   nodular liver contour, and ascites.    The splenic, portal, hepatic veins are patent. There is no evidence of   thrombosis. The splenic and portal veins demonstrate hepatopedal flow.   Main portal vein measures 1.2 cm. The main portal vein has a peak   systolic velocity of 20 cm/s with phasic blood flow. The right and left   portal veins are patent.    Peak systolic velocity in the hepatic artery is 116.7 cm/s.    The intrahepatic IVC is patent.    IMPRESSION:    No evidence of splenic, portal, or hepatic vein thrombosis.    MILTON LAGOS M.D., RADIOLOGY RESIDENT  This document has been electronically signed.  SOFI REYEZ M.D., ATTENDING RADIOLOGIST  This document has been electronically signed. Feb 14 2018  1:00PM      < end of copied text >  ---------------------------------------------------------------------------------------------------------

## 2018-02-14 NOTE — PROGRESS NOTE ADULT - ASSESSMENT
· Assessment		  73 yo female with PMH of breast cancer with mets to lung, liver, bones, s/p mastectomy, s/p depression, HTN, HLD, T2DM, presents with SOB and LE swelling.      ·  Problem: Dyspnea, unspecified type.  Plan: Patient with dyspnea and LE edema, possibly secondary to fluid overload  PE ruled out  leukocytosis likely due to steroid use   s/p IV vancomycin and IV cefepime; we will hold off on further abx at this time, unless patient develops fever or other symptoms suggestive of pneumonia  s/p lasix 40mg IV in ED  will continue lasix 40mg IV daily  check echocardiogram  monitor ins/outs and daily weight  check venous duplex to rule out DVT LE.  cardiology evaluation called Dr. Amaral     ·  Problem: CHF exacerbation.  Plan: s/p lasix 40 mg IV in ED  will continue lasix 40mg IV daily for now  check echocardiogram  monitor ins/outs and daily weight  hold aldactone and triamterene/HCTZ for now.   cardiology evaluation Dr. Amaral  pulmonary evaluation called Dr. Babin      ·  Problem: PIERRE (acute kidney injury).  Plan: baseline creatinine likely 0.8  unclear etiology of PIERRE, ? metastases; CT scan shows hyperdense lesions  will monitor BMP while patient is on diuretics  hold triamterene/HCTZ for now  hold aldactone for now.       ·  Problem: Elevated LFTs.  Plan: likely due to metastasis  no biliary obstruction seen on CT scan  monitor LFTs for now  check US doppler to assess for PVT.     ·  Problem: Lactate blood increase. Plan: Unclear etiology, not likely septic  will repeat lactate level.    Problem: Type 2 diabetes mellitus. Plan: check A1c  sliding scale  on tujeo 40u at bedtime; will start lantus 32u.    ·  Problem: Depression.  Plan: escitalopram.     ·  Problem: Thrombocytopenia.  Plan: platelet was 89 on 2/1/18  ?chemo induced  will monitor for now.       ·  Problem: Prophylactic measure.  Plan: no AC at this time, due to rapidly worsening thrombocytopenia  monitor platelet for now.      Order for DNR/DNI placed.    Rick Jacobo MD pager [phone] · Assessment		  73 yo female with PMH of breast cancer with mets to lung, liver, bones, s/p mastectomy, s/p depression, HTN, HLD, T2DM, presents with SOB and LE swelling.      ·  Problem: Dyspnea, unspecified type.  Plan: Patient with dyspnea and LE edema, possibly secondary to fluid overload  PE ruled out  leukocytosis likely due to steroid use   s/p IV vancomycin and IV cefepime; we will hold off on further abx at this time, unless patient develops fever or other symptoms suggestive of pneumonia  s/p lasix 40mg IV in ED  will continue lasix 40mg IV daily  check echocardiogram  monitor ins/outs and daily weight  check venous duplex to rule out DVT LE.  cardiology evaluation called Dr. Amaral     ·  Problem: CHF exacerbation.  Plan: s/p lasix 40 mg IV in ED  will continue lasix 40mg IV daily for now  check echocardiogram  monitor ins/outs and daily weight  hold aldactone and triamterene/HCTZ for now.   cardiology evaluation Dr. Amaral  pulmonary evaluation called Dr. Babin      ·  Problem: PIERRE (acute kidney injury).  Plan: baseline creatinine likely 0.8  unclear etiology of PIERRE, ? metastases; CT scan shows hyperdense lesions  will monitor BMP while patient is on diuretics  hold triamterene/HCTZ for now  hold aldactone for now.       ·  Problem: Elevated LFTs.  Plan: likely due to metastasis  no biliary obstruction seen on CT scan  monitor LFTs for now  check US doppler to assess for PVT.     ·  Problem: Lactate blood increase. Plan: Unclear etiology, not likely septic  will repeat lactate level.    Problem: Type 2 diabetes mellitus. Plan: check A1c  sliding scale  on tujeo 40u at bedtime; will start lantus 32u.    ·  Problem: Depression.  Plan: escitalopram.     ·  Problem: Thrombocytopenia.  Plan: platelet was 89 on 2/1/18  ?chemo induced  will monitor for now.       ·  Problem: Prophylactic measure.  Plan: no AC at this time, due to rapidly worsening thrombocytopenia  monitor platelet for now.    Metastatic breast cancer- Oncology evaluation Dr. Amparo reza called.      Order for DNR/DNI placed.    Rick Jacobo MD pager [phone]

## 2018-02-14 NOTE — CONSULT NOTE ADULT - ASSESSMENT
ASSESSMENT    metastatic breast cancer to the bones, liver and lung s/p mastectomy, craniotomy and chest irradiation - recent decline over the last month punctuated by profound fatigue associated with confusion and memory loss - lower extremity swelling - difficulty walking due to a baseline unsteady gait and leg swelling - suspect that the patient has developed hepatic encephalopathy in the setting of extensive metastatic disease to the liver - shortness of breath is likely related to deconditioning, restrictive lung disease related to obesity and as appropriate compensation for her lactic acidosis in the setting of a hypermetabolic state - i believe the left lower lobe abnormalities are related to the patient's prior chest irradiation rather than new infection - there is little evidence of a pulmonary embolism on V/Q scan    PLAN/RECOMMENDATIONS    oxygen supplementation to keep saturation greater than 92%  continue current dose of prednisone  no pulmonary medications indicated  will review CT scans  check ammonia level  hold diuretics in the absence of pulmonary edema/pleural effusions and with PIERRE  start lactulose if ammonia level is elevated - hepatology evaluation  thigh high large ARA stockings  would consider holding zocor  agree with observation off antibiotics  glucose control    Thank you for the courtesy of this referral. Plan of care discussed with the daughter at bedside and Dr. Odalis Babin MD, [facility] - [phone]  Pulmonary Medicine ASSESSMENT    metastatic breast cancer to the bones, liver and lung s/p mastectomy, craniotomy and chest irradiation - recent decline over the last month punctuated by profound fatigue associated with confusion, slurred speech and memory loss - lower extremity swelling - difficulty walking due to a baseline unsteady gait and leg swelling - suspect that the patient has developed hepatic encephalopathy in the setting of extensive metastatic disease to the liver +/- cirrhosis - shortness of breath is likely related to deconditioning, restrictive lung disease related to obesity and ascites and as appropriate compensation for her lactic acidosis in the setting of a hypermetabolic state - i believe the left lower lobe abnormalities are related to the patient's prior chest irradiation rather than new infection - there is little evidence of a pulmonary embolism on V/Q scan    PLAN/RECOMMENDATIONS    oxygen supplementation to keep saturation greater than 92%  continue current dose of prednisone  no pulmonary medications indicated  will review CT scans  check ammonia level  hold diuretics in the absence of pulmonary edema/pleural effusions and with PIERRE  start lactulose if ammonia level is elevated - hepatology evaluation  thigh high large ARA stockings  would consider holding zocor  agree with observation off antibiotics  glucose control    Thank you for the courtesy of this referral. Plan of care discussed with the daughter at bedside and Dr. Odalis Babin MD, [facility] - [phone]  Pulmonary Medicine ASSESSMENT    metastatic breast cancer to the bones, liver and lung s/p mastectomy, craniotomy and chest irradiation - recent decline over the last month punctuated by profound fatigue associated with confusion, slurred speech and memory loss - lower extremity swelling - difficulty walking due to a baseline unsteady gait and leg swelling - suspect that the patient has developed hepatic encephalopathy in the setting of extensive metastatic disease to the liver +/- cirrhosis - shortness of breath is likely related to deconditioning, restrictive lung disease related to obesity and ascites and as appropriate compensation for her lactic acidosis in the setting of a hypermetabolic state - i believe the left lower lobe abnormalities are related to the patient's prior chest irradiation rather than new infection - there is little evidence of a pulmonary embolism on V/Q scan - anemia and thrombocytopenia is likely due to bone marrow replacement by tumor - worsening renal function    PLAN/RECOMMENDATIONS    oxygen supplementation to keep saturation greater than 92%  continue current dose of prednisone  no pulmonary medications indicated  will review CT scans  check ammonia level  hold diuretics in the absence of pulmonary edema/pleural effusions and with PIERRE  start lactulose if ammonia level is elevated - hepatology evaluation  thigh high large ARA stockings  would consider holding zocor  agree with observation off antibiotics  glucose control    Thank you for the courtesy of this referral. Plan of care discussed with the daughter at bedside and Dr. Odalis Babin MD, [facility] - [phone]  Pulmonary Medicine

## 2018-02-14 NOTE — CONSULT NOTE ADULT - CONSULT REASON
metastatic breast cancer; abnormal CXR; dyspnea; metastatic breast cancer; abnormal CXR; radiation pneumonitis; dyspnea;

## 2018-02-15 DIAGNOSIS — R60.9 EDEMA, UNSPECIFIED: ICD-10-CM

## 2018-02-15 DIAGNOSIS — N17.9 ACUTE KIDNEY FAILURE, UNSPECIFIED: ICD-10-CM

## 2018-02-15 LAB
ANION GAP SERPL CALC-SCNC: 15 MMOL/L — SIGNIFICANT CHANGE UP (ref 5–17)
APPEARANCE UR: CLEAR — SIGNIFICANT CHANGE UP
BACTERIA # UR AUTO: ABNORMAL
BILIRUB UR-MCNC: ABNORMAL
BUN SERPL-MCNC: 71 MG/DL — HIGH (ref 7–23)
CALCIUM SERPL-MCNC: 8.2 MG/DL — LOW (ref 8.4–10.5)
CHLORIDE SERPL-SCNC: 96 MMOL/L — SIGNIFICANT CHANGE UP (ref 96–108)
CHLORIDE UR-SCNC: <20 MMOL/L — SIGNIFICANT CHANGE UP
CO2 SERPL-SCNC: 26 MMOL/L — SIGNIFICANT CHANGE UP (ref 22–31)
COLOR SPEC: ABNORMAL
COMMENT - URINE: SIGNIFICANT CHANGE UP
COMMENT - URINE: SIGNIFICANT CHANGE UP
CREAT ?TM UR-MCNC: 101 MG/DL — SIGNIFICANT CHANGE UP
CREAT SERPL-MCNC: 2.45 MG/DL — HIGH (ref 0.5–1.3)
DIFF PNL FLD: NEGATIVE — SIGNIFICANT CHANGE UP
EPI CELLS # UR: 21 /HPF — HIGH (ref 0–5)
GLUCOSE BLDC GLUCOMTR-MCNC: 112 MG/DL — HIGH (ref 70–99)
GLUCOSE BLDC GLUCOMTR-MCNC: 113 MG/DL — HIGH (ref 70–99)
GLUCOSE BLDC GLUCOMTR-MCNC: 122 MG/DL — HIGH (ref 70–99)
GLUCOSE BLDC GLUCOMTR-MCNC: 128 MG/DL — HIGH (ref 70–99)
GLUCOSE BLDC GLUCOMTR-MCNC: 137 MG/DL — HIGH (ref 70–99)
GLUCOSE SERPL-MCNC: 116 MG/DL — HIGH (ref 70–99)
GLUCOSE UR QL: NEGATIVE MG/DL — SIGNIFICANT CHANGE UP
HCT VFR BLD CALC: 28.7 % — LOW (ref 34.5–45)
HGB BLD-MCNC: 10.3 G/DL — LOW (ref 11.5–15.5)
HYALINE CASTS # UR AUTO: 0 /LPF — SIGNIFICANT CHANGE UP (ref 0–7)
KETONES UR-MCNC: NEGATIVE — SIGNIFICANT CHANGE UP
LEUKOCYTE ESTERASE UR-ACNC: NEGATIVE — SIGNIFICANT CHANGE UP
MCHC RBC-ENTMCNC: 35.9 GM/DL — SIGNIFICANT CHANGE UP (ref 32–36)
MCHC RBC-ENTMCNC: 37.6 PG — HIGH (ref 27–34)
MCV RBC AUTO: 104.7 FL — HIGH (ref 80–100)
NITRITE UR-MCNC: NEGATIVE — SIGNIFICANT CHANGE UP
PH UR: 5 — SIGNIFICANT CHANGE UP (ref 5–8)
PLATELET # BLD AUTO: 54 K/UL — LOW (ref 150–400)
POTASSIUM SERPL-MCNC: 3.6 MMOL/L — SIGNIFICANT CHANGE UP (ref 3.5–5.3)
POTASSIUM SERPL-SCNC: 3.6 MMOL/L — SIGNIFICANT CHANGE UP (ref 3.5–5.3)
POTASSIUM UR-SCNC: 36 MMOL/L — SIGNIFICANT CHANGE UP
PROT ?TM UR-MCNC: 7 MG/DL — SIGNIFICANT CHANGE UP (ref 0–12)
PROT UR-MCNC: NEGATIVE MG/DL — SIGNIFICANT CHANGE UP
PROT/CREAT UR-RTO: 0.1 RATIO — SIGNIFICANT CHANGE UP (ref 0–0.2)
RBC # BLD: 2.74 M/UL — LOW (ref 3.8–5.2)
RBC # FLD: 21.9 % — HIGH (ref 10.3–14.5)
RBC CASTS # UR COMP ASSIST: 5 /HPF — HIGH (ref 0–4)
SODIUM SERPL-SCNC: 137 MMOL/L — SIGNIFICANT CHANGE UP (ref 135–145)
SODIUM UR-SCNC: <20 MMOL/L — SIGNIFICANT CHANGE UP
SP GR SPEC: 1.02 — SIGNIFICANT CHANGE UP (ref 1.01–1.02)
UROBILINOGEN FLD QL: 1 MG/DL — SIGNIFICANT CHANGE UP
UUN UR-MCNC: 741 MG/DL — SIGNIFICANT CHANGE UP
WBC # BLD: 10.92 K/UL — HIGH (ref 3.8–10.5)
WBC # FLD AUTO: 10.92 K/UL — HIGH (ref 3.8–10.5)
WBC UR QL: 2 /HPF — SIGNIFICANT CHANGE UP (ref 0–5)

## 2018-02-15 PROCEDURE — 76775 US EXAM ABDO BACK WALL LIM: CPT | Mod: 26

## 2018-02-15 RX ADMIN — Medication 5 MILLIGRAM(S): at 05:08

## 2018-02-15 RX ADMIN — Medication 10 MILLIGRAM(S): at 05:08

## 2018-02-15 RX ADMIN — ESCITALOPRAM OXALATE 20 MILLIGRAM(S): 10 TABLET, FILM COATED ORAL at 11:37

## 2018-02-15 RX ADMIN — INSULIN GLARGINE 32 UNIT(S): 100 INJECTION, SOLUTION SUBCUTANEOUS at 22:22

## 2018-02-15 RX ADMIN — PANTOPRAZOLE SODIUM 40 MILLIGRAM(S): 20 TABLET, DELAYED RELEASE ORAL at 05:08

## 2018-02-15 NOTE — CONSULT NOTE ADULT - SUBJECTIVE AND OBJECTIVE BOX
Patient is a 72y old  Female who presented with a chief complaint of shortness of breath (13 Feb 2018 21:46)      HPI:  73 yo female with PMH of breast cancer with mets to lung, liver, bones, s/p mastectomy, Hx depression, HTN, HLD, T2DM, presents with SOB and LE swelling.  Patient has been doing well except last 2-3 weeks had SOB and LE edema.  She also had cough, for which she was started on prednisone.  Currently cough has completely resolved.  She was initially started on lasix 2 pills of 20mg daily, which was decreased to 1 pill/daily two weeks ago because patient has been urinating too much.  Over the last 2 weeks, she has been becoming progressively more weak- chemo was discontinued.  Three days prior to admission she had worsening of her LE edema to the point that she can't ambulate well or bend her knees adn reportedly fell at home 2 times per spouse.  She can't walk more than a few feet without having dyspnea.  She now has to sleep on a recliner.  She denies PND.  She also has unsteady gait, had recent MRI brain which was apparently negative.  She admits to drinking 3-4 bottles of water and 1-2 cans of gingerale daily.  She denies any chest pain, dizziness, blurry vision, cough, fever, urinary or bowel changes. (13 Feb 2018 21:46)    She has been noted to have decreasing renal function that her doctors were monitoring.  We are asked to evaluate for possible underlying cirrhosis (noted nodular contour of liver on CT), pt noted to have elevated LFTs and there is a question as to whether there may be a component of hepatic encephalopathy contributing to her generalized weakness.  She has a known baseline tremor per history.    No diarrhea, rectal bleeding or melena. +poor appetite - declining significantly over past 3+ weeks  No reported history of Hepatitis or diagnosis of cirrhosis    PAST MEDICAL & SURGICAL HISTORY:  Depression  Tremor  Endogenous hyperlipemia  Hypertension  Breast cancer  Type 2 diabetes mellitus  Portacath in place: left upper chest  S/P left cataract extraction  H/O oral surgery: wisdom tooth removal  H/O craniotomy  H/O mastectomy, right      Allergies  Levaquin (Short breath)  penicillin (Swelling)        MEDICATIONS  (STANDING):  dextrose 5%. 1000 milliLiter(s) (50 mL/Hr) IV Continuous <Continuous>  dextrose 50% Injectable 12.5 Gram(s) IV Push once  dextrose 50% Injectable 25 Gram(s) IV Push once  dextrose 50% Injectable 25 Gram(s) IV Push once  escitalopram 20 milliGRAM(s) Oral daily  insulin glargine Injectable (LANTUS) 32 Unit(s) SubCutaneous at bedtime  insulin lispro (HumaLOG) corrective regimen sliding scale   SubCutaneous three times a day before meals  insulin lispro (HumaLOG) corrective regimen sliding scale   SubCutaneous at bedtime  pantoprazole    Tablet 40 milliGRAM(s) Oral before breakfast  predniSONE   Tablet 5 milliGRAM(s) Oral daily  propranolol 10 milliGRAM(s) Oral daily    MEDICATIONS  (PRN):  dextrose Gel 1 Dose(s) Oral once PRN Blood Glucose LESS THAN 70 milliGRAM(s)/deciliter  glucagon  Injectable 1 milliGRAM(s) IntraMuscular once PRN Glucose LESS THAN 70 milligrams/deciliter      Social History:    Marital Status:  (  X )    (   ) Single    (   )    (  )   Lives with: (  ) alone  (  ) children   ( X ) spouse   (  ) parents  (  ) other    No tobacco or ETOH abuse    Family History   IBD (  ) Yes   ( X ) No  GI Malignancy (  )  Yes    ( X ) No    Health Management  Last Colonoscopy - unable to recall      Advanced Directives: (     ) None    (  X    ) DNR    (     ) DNI    (     ) Health Care Proxy:     Review of Systems:    General:  +generalized fatigue No fever or chills   CV:  No pain, palpitations, hypo/hypertension  Resp:  +cough 9now resolved) +SOB  GI:  see HPI  :  No pain, bleeding, incontinence, nocturia  Muscle:  No pain, +generalized weakness  Neuro: no LOC, no known history of brain mets (reports recent negative MRI brain) +skull/bony mets  Psych:  +generalized fatigue  Endocrine:  No polyuria, polydypsia, cold/heat intolerance  Heme:  +met breast CA  Skin:  no rash, jaundice +edema      Vital Signs Last 24 Hrs  T(C): 36.3 (15 Feb 2018 12:00), Max: 36.4 (15 Feb 2018 04:38)  T(F): 97.3 (15 Feb 2018 12:00), Max: 97.6 (15 Feb 2018 04:38)  HR: 76 (15 Feb 2018 12:00) (76 - 85)  BP: 133/76 (15 Feb 2018 12:00) (96/60 - 133/76)  BP(mean): --  RR: 18 (15 Feb 2018 12:00) (18 - 20)  SpO2: 99% (15 Feb 2018 12:00) (98% - 99%)    PHYSICAL EXAM:    Constitutional: elderly female lying in bed, spouse and daughter at bedside +breathless during interview  Neck: No LAD, supple, no JVD  Respiratory: decreased BS at bases, no rales or wheeze  Chest wall: +rt. mastectomy, Lt. mediport  Cardiovascular: S1 and S2, RRR  Gastrointestinal: BS+, softly distended, NT no rebound, guarding or rigidity  Extremities: ++edema (+ARA stocking in place)  Neurological: drowsy but responsive no focal asymmetry  Skin: No rashes, anicteric        LABS:                        10.3   10.92 )-----------( 54       ( 15 Feb 2018 07:39 )             28.7     02-15    137  |  96  |  71<H>  ----------------------------<  116<H>  3.6   |  26  |  2.45<H>    Ca    8.2<L>      15 Feb 2018 07:39  Phos  5.5     02-14  Mg     2.4     02-14    TPro  4.5<L>  /  Alb  2.2<L>  /  TBili  4.8<H>  /  DBili  x   /  AST  82<H>  /  ALT  41  /  AlkPhos  221<H>  02-14    PT/INR - ( 13 Feb 2018 17:08 )   PT: 16.5 sec;   INR: 1.51 ratio    PTT - ( 13 Feb 2018 17:08 )  PTT:31.7 sec          RADIOLOGY & ADDITIONAL TESTS:  < from: CT Abdomen and Pelvis No Cont (02.13.18 @ 20:02) >    INTERPRETATION:  CLINICAL INFORMATION: Elevated bilirubin. History of   breast cancer. Shortness of breath.    COMPARISON: None.    PROCEDURE:   CT of the Abdomen and Pelvis was performed without intravenous contrast.   Intravenous contrast: None.  Oral contrast: None.  Sagittal and coronal reformats were performed.    FINDINGS:    LOWER CHEST: Small left pleural effusion. Left basilar consolidation may   represent atelectasis or pneumonia. Coronary artery calcifications. Right   mastectomy.    LIVER: Nodular hepatic contour. Multiple bilobar ill-defined hypodense   hepatic lesions compatible with metastatic disease. Two lesions in the   right hepatic lobe demonstrate peripheral calcification.  BILE DUCTS: Normal caliber.  GALLBLADDER: Collapsed and therefore not well evaluated.  SPLEEN: Within normal limits.  PANCREAS: Within normal limits.  ADRENALS: Within normal limits.  KIDNEYS/URETERS: Subcentimeter hyperdense left renal focus too small to   characterize.    BLADDER: Within normal limits.  REPRODUCTIVE ORGANS: Anterior fundal fibroid.    BOWEL: Diverticulosis without diverticulitis. No bowel obstruction.   Appendix is not identified.  PERITONEUM: Moderate volume ascites.  VESSELS:  Atheromatous disease of the aorta.  RETROPERITONEUM: No lymphadenopathy.    ABDOMINAL WALL: Within normal limits.  BONES: Sclerotic osseous metastases in multiple vertebral bodies,   sternum, and ribs.    IMPRESSION:   Bilobar hepatic metastases.  Sclerotic osseous metastases.  Small left pleural effusion. Left basilar consolidation may represent   atelectasis or pneumonia.      US Abdomen Doppler (02.14.18 @ 15:18)   INTERPRETATION:  CLINICAL INFORMATION: Increasing liver function tests.   History of metastatic liver disease. Evaluation for portal vein   thrombosis.     EXAM: Focused grayscale, color, and spectral Doppler ultrasound imaging   was performed of the splenic, portal, and hepatic veins.    COMPARISON: CT abdomen pelvis dated 2/13/2018.    FINDINGS:    There is redemonstration of partially imaged metastatic liver disease,   nodular liver contour, and ascites.    The splenic, portal, hepatic veins are patent. There is no evidence of   thrombosis. The splenic and portal veins demonstrate hepatopedal flow.   Main portal vein measures 1.2 cm. The main portal vein has a peak   systolic velocity of 20 cm/s with phasic blood flow. The right and left   portal veins are patent.    Peak systolic velocity in the hepatic artery is 116.7 cm/s.    The intrahepatic IVC is patent.    IMPRESSION:    No evidence of splenic, portal, or hepatic vein thrombosis.

## 2018-02-15 NOTE — PROGRESS NOTE ADULT - SUBJECTIVE AND OBJECTIVE BOX
Patient is a 72y old  Female who presents with a chief complaint of shortness of breath (13 Feb 2018 21:46)      SUBJECTIVE / OVERNIGHT EVENTS: tired, difficulty speaking. Family at bedside.   Review of Systems  chest pain no  palpitations no  sob no  nausea no  headache no    MEDICATIONS  (STANDING):  dextrose 5%. 1000 milliLiter(s) (50 mL/Hr) IV Continuous <Continuous>  dextrose 50% Injectable 12.5 Gram(s) IV Push once  dextrose 50% Injectable 25 Gram(s) IV Push once  dextrose 50% Injectable 25 Gram(s) IV Push once  escitalopram 20 milliGRAM(s) Oral daily  insulin glargine Injectable (LANTUS) 32 Unit(s) SubCutaneous at bedtime  insulin lispro (HumaLOG) corrective regimen sliding scale   SubCutaneous three times a day before meals  insulin lispro (HumaLOG) corrective regimen sliding scale   SubCutaneous at bedtime  pantoprazole    Tablet 40 milliGRAM(s) Oral before breakfast  predniSONE   Tablet 5 milliGRAM(s) Oral daily  propranolol 10 milliGRAM(s) Oral daily    MEDICATIONS  (PRN):  dextrose Gel 1 Dose(s) Oral once PRN Blood Glucose LESS THAN 70 milliGRAM(s)/deciliter  glucagon  Injectable 1 milliGRAM(s) IntraMuscular once PRN Glucose LESS THAN 70 milligrams/deciliter          PHYSICAL EXAM:  GENERAL: NAD, well-developed  HEAD:  Atraumatic, Normocephalic  EYES: EOMI, PERRLA, conjunctiva and sclera clear  NECK: Supple, No JVD  CHEST/LUNG: Clear to auscultation bilaterally; No wheeze  HEART: Regular rate and rhythm; No murmurs, rubs, or gallops  ABDOMEN: Soft, Nontender, Nondistended; Bowel sounds present  EXTREMITIES:  2+ Peripheral Pulses, No clubbing, cyanosis, 2+ edema  PSYCH: AAOx3  NEUROLOGY: non-focal  SKIN: No rashes or lesions    LABS:                        10.3   10.92 )-----------( 54       ( 15 Feb 2018 07:39 )             28.7     02-15    137  |  96  |  71<H>  ----------------------------<  116<H>  3.6   |  26  |  2.45<H>    Ca    8.2<L>      15 Feb 2018 07:39  Phos  5.5     02-14  Mg     2.4     02-14    TPro  4.5<L>  /  Alb  2.2<L>  /  TBili  4.8<H>  /  DBili  x   /  AST  82<H>  /  ALT  41  /  AlkPhos  221<H>  02-14    PT/INR - ( 13 Feb 2018 17:08 )   PT: 16.5 sec;   INR: 1.51 ratio         PTT - ( 13 Feb 2018 17:08 )  PTT:31.7 sec  CARDIAC MARKERS ( 13 Feb 2018 17:08 )  x     / <0.01 ng/mL / x     / x     / x              RADIOLOGY & ADDITIONAL TESTS:    Imaging Personally Reviewed:    Consultant(s) Notes Reviewed:      Care Discussed with Consultants/Other Providers:

## 2018-02-15 NOTE — CONSULT NOTE ADULT - PROBLEM SELECTOR RECOMMENDATION 9
differentials include: Pre renal vs ATN ( from gemcitabine +/- TMA) vs obstruction  check ua  check urine pro/cr  check urine na/k/cl/osm/cr/urea  check renal US  would keep of diuretics and IVF for now  encourage po intake  trend bmp  avoid nephrotoxins

## 2018-02-15 NOTE — CONSULT NOTE ADULT - PROBLEM SELECTOR RECOMMENDATION 2
no sob for now  defintely has volume  will hold off diuretics for now given PIERRE  monitor clinically

## 2018-02-15 NOTE — PROGRESS NOTE ADULT - ASSESSMENT
ASSESSMENT    metastatic breast cancer to the bones, liver and lung s/p mastectomy, craniotomy and chest irradiation - recent decline over the last month punctuated by profound fatigue associated with confusion, slurred speech and memory loss - lower extremity swelling - difficulty walking due to an unsteady gait and leg swelling - suspect that the patient has developed hepatic encephalopathy in the setting of extensive metastatic disease to the liver +/- cirrhosis - shortness of breath is likely related to deconditioning, restrictive lung disease related to radiation pneumonitis, obesity and ascites and as appropriate compensation for her lactic acidosis in the setting of a hypermetabolic state - i believe the left lower lobe abnormalities are related to the patient's prior chest irradiation rather than new infection - there is little evidence of a pulmonary embolism on V/Q scan - anemia and thrombocytopenia is likely due to bone marrow replacement by tumor - worsening renal function    PLAN/RECOMMENDATIONS    oxygen supplementation to keep saturation greater than 92%  continue current dose of prednisone  no pulmonary medications indicated  observe off antibiotics  ammonia level not elevated - GI evaluation  hold diuretics in the absence of pulmonary edema/pleural effusions and with PIERRE  thigh high large ARA stockings  would consider holding zocor  glucose control    Will follow with you. Plan of care discussed with the  at bedside and Dr. Odalis Babin MD, [facility] - [phone]  Pulmonary Medicine

## 2018-02-15 NOTE — CONSULT NOTE ADULT - ASSESSMENT
73 yo female with PMH of breast cancer with mets to lung, liver, bones, s/p mastectomy, s/p depression, HTN, HLD, T2DM, presents with SOB and LE swelling found to have PIERRE

## 2018-02-15 NOTE — PROGRESS NOTE ADULT - ASSESSMENT
72 year old female with breast cancer with mets to lung, liver, bones, s/p mastectomy, s/p depression, HTN, HLD, T2DM admitted with LE edema/ SOB     1. cv stable   no chest pain or sob.  EKG revealing no evidence of acute ischemia/ACS   echo revealing normal lv sys fx     2. SOB   likely secondary to pulm disease   echo revealing normal lv sys fx   VQ scan revealing low probability of PE   chest xray revealing sml Left pleural effusion   continue to hold lasix given PIERRE , no evidence of decomp CHF on exam   pulm f/u     3. LE edema   likely secondary to hypoalbuminemia / extensive mets disease   Ct abd/pelvis revealing bilobar hepatic mets/ sclerotic osseous mets/ sml left pleural effusion   LE duplex negative for thrombosis   lasix on hold given PIERRE   med/ onc f/u     4 htn   continue with current anti-htn med     5. elevated LFTS  gi f/u   plan for IR for paracentesis     6. PIERRE   lasix on hold   renal f.u     dvt ppx

## 2018-02-15 NOTE — PROGRESS NOTE ADULT - SUBJECTIVE AND OBJECTIVE BOX
[facility] - [facility]     PROGRESS NOTE    CHIEF COMPLAINT: dyspnea; hypoxemia; radiation pneumonitis; abnormal CXR    INTERVAL HISTORY: less leg swelling with thigh high ARA stockings; no shortness of breath, cough, sputum production, chest congestion or wheeze; remains weak, frail, tired and confused; no fevers, chills or sweats; no chest pain/pressure or palpitations    REVIEW OF SYSTEMS:  Constitutional: As per interval history  HEENT: Within normal limits  CV: As per interval history  Resp: As per interval history  GI: Within normal limits   : Within normal limits  Musculoskeletal: Within normal limits  Skin: Within normal limits  Neurological: As per interval history  Psychiatric: Within normal limits  Endocrine: Within normal limits  Hematologic/Lymphatic: Within normal limits  Allergic/Immunologic: Within normal limits      MEDICATIONS:     Pulmonary "      Anti-microbials:      Cardiovascular:  propranolol 10 milliGRAM(s) Oral daily      Other:  dextrose 5%. 1000 milliLiter(s) IV Continuous <Continuous>  dextrose 50% Injectable 12.5 Gram(s) IV Push once  dextrose 50% Injectable 25 Gram(s) IV Push once  dextrose 50% Injectable 25 Gram(s) IV Push once  dextrose Gel 1 Dose(s) Oral once PRN  escitalopram 20 milliGRAM(s) Oral daily  glucagon  Injectable 1 milliGRAM(s) IntraMuscular once PRN  insulin glargine Injectable (LANTUS) 32 Unit(s) SubCutaneous at bedtime  insulin lispro (HumaLOG) corrective regimen sliding scale   SubCutaneous three times a day before meals  insulin lispro (HumaLOG) corrective regimen sliding scale   SubCutaneous at bedtime  pantoprazole    Tablet 40 milliGRAM(s) Oral before breakfast  predniSONE   Tablet 5 milliGRAM(s) Oral daily  simvastatin 10 milliGRAM(s) Oral at bedtime        OBJECTIVE:    I&O's Detail    14 Feb 2018 07:01  -  15 Feb 2018 07:00  --------------------------------------------------------  IN:    Oral Fluid: 830 mL  Total IN: 830 mL    OUT:    Voided: 1125 mL  Total OUT: 1125 mL    Total NET: -295 mL    POCT Blood Glucose.: 113 mg/dL (15 Feb 2018 07:28)  POCT Blood Glucose.: 154 mg/dL (14 Feb 2018 21:04)  POCT Blood Glucose.: 149 mg/dL (14 Feb 2018 16:56)  POCT Blood Glucose.: 138 mg/dL (14 Feb 2018 12:22)      PHYSICAL EXAM:       ICU Vital Signs Last 24 Hrs  T(C): 36.4 (15 Feb 2018 04:38), Max: 36.4 (15 Feb 2018 04:38)  T(F): 97.6 (15 Feb 2018 04:38), Max: 97.6 (15 Feb 2018 04:38)  HR: 85 (15 Feb 2018 05:05) (77 - 85)  BP: 101/65 (15 Feb 2018 05:05) (96/60 - 119/68)  BP(mean): --  ABP: --  ABP(mean): --  RR: 20 (15 Feb 2018 05:05) (18 - 20)  SpO2: 98% (15 Feb 2018 04:38) (98% - 99%) on 2lpm     General: Awake. Alert. Sleepy. Confused. Cooperative. No distress. Appears stated age 	  HEENT:   Atraumatic. Normocephalic. Icteric sclerae. Normal oral mucosa, PERRL, EOMI  Neck: Supple. Trachea midline. Thyroid without enlargement/tenderness/nodules. No carotid bruit. No JVD	  Cardiovascular: Regular rate and rhythm. S1 S2 normal. No murmurs, rubs or gallops  Respiratory: Respirations unlabored. Clear to auscultation and percussion bilaterally  Abdomen: Soft. Non-tender. Non-distended. No organomegaly. No masses. Normal bowel sounds	  Extremities: Warm to touch. No clubbing or cyanosis. Marked bilateral lower extremity edema  Pulses: 2+ peripheral pulses all extremities	  Skin: Normal skin color. No rashes or lesions. No ecchymoses, No cyanosis. Warm to touch  Lymph Nodes: Cervical, supraclavicular and axillary nodes normal  Neurological: Motor and sensory examination equal and normal. A and O x 3  Psychiatry: Lethargic        LABS:                        10.9   11.3  )-----------( 35       ( 14 Feb 2018 13:35 )             30.9                         11.6   15.2  )-----------( 43       ( 13 Feb 2018 17:08 )             35.6     02-15    137  |  96  |  71<H>  ----------------------------<  116<H>  3.6   |  26  |  2.45<H>    02-14    135  |  94<L>  |  65<H>  ----------------------------<  162<H>  3.7   |  26  |  1.75<H>    Ca      8.2<L>      02-15    Ca      8.5      02-14    Phos    5.5     02-14      Mg       2.4     02-14    TPro  4.5<L>  /  Alb  2.2<L>  /  TBili  4.8<H>  /  DBili  x   /  AST  82<H>  /  ALT  41  /  AlkPhos  221<H>  02-14    TPro  5.3<L>  /  Alb  2.4<L>  /  TBili  5.2<H>  /  DBili  x   /  AST  90<H>  /  ALT  48<H>  /  AlkPhos  244<H>  02-13    Ammonia, Serum (02.14.18 @ 17:59)    Ammonia, Serum: 47 umol/L    PT/INR - ( 13 Feb 2018 17:08 )   PT: 16.5 sec;   INR: 1.51 ratio       PTT - ( 13 Feb 2018 17:08 )  PTT:31.7 sec    Venous Blood Gas:  02-14 @ 13:36  --/--/--/--/--  VBG Lactate: 2.4  Venous Blood Gas:    02-13 @ 23:56  --/--/--/--/--  VBG Lactate: 3.6    Venous Blood Gas:  02-13 @ 18:36  7.49/36/26/27/40  VBG Lactate: 4.0    Venous Blood Gas:  02-13 @ 17:08  7.41/44/27/28/39  VBG Lactate: 5.0    Serum Pro-Brain Natriuretic Peptide: 2480 pg/mL (02-13 @ 17:08)    CARDIAC MARKERS ( 13 Feb 2018 17:08 )  x     / <0.01 ng/mL / x     / x     / x        < from: Transthoracic Echocardiogram (02.14.18 @ 08:59) >    Patient name: STELLA SIMMONS  YOB: 1945   Age: 72 (F)   MR#: [id number]  Study Date: 2/14/2018  Location: [address] [place], [place] [id number]: [facility]  Study quality: Technically difficult  Referring Physician: Rick Jacobo MD  Blood Pressure: 124/72 mmHg  Height: 163 cm  Weight: 93 kg  BSA: 2 m2  ------------------------------------------------------------------------  PROCEDURE: Transthoracic echocardiogram with 2-D, M-Mode  and complete spectral and color flow Doppler.  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 Mild mitral  regurgitation.  Aortic Valve/Aorta: Calcified trileaflet aortic valve with  normal opening. Mild aortic regurgitation.  Normal aortic root size. (Ao: 2.9 cm at the sinuses of  Valsalva).  Left Atrium: Normal left atrium.  LA volume index = 26  cc/m2.  Left Ventricle: Normal left ventricular systolic function.  No segmental wall motion abnormalities. Normal left  ventricular internal dimensions and wall thicknesses.  Right Heart: Normal right atrium. The right ventricle is  not well visualized; grossly normal right ventricular  systolic function. Normal tricuspid valve. Mild-moderate  tricuspid regurgitation. Pulmonic valve not well  visualized, probably normal. Minimal pulmonic  regurgitation.  Pericardium/Pleura: No pericardial effusion seen. Bilateral  pleural effusions. Ascites seen.  Hemodynamic: Estimated right atrial pressure is 8 mm Hg.  Estimated right ventricular systolic pressure equals 42 mm  Hg, assuming right atrial pressure equals 8 mm Hg,  consistent with mild pulmonary hypertension.  ------------------------------------------------------------------------  Conclusions:  1. Calcified trileaflet aortic valve with normalopening.  Mild aortic regurgitation.  2. Normal left ventricular systolic function. No segmental  wall motion abnormalities.  3. The right ventricle is not well visualized; grossly  normal right ventricular systolic function.  4. No pericardial effusion seen. Bilateral pleural  effusions. Ascites seen.  *** No previous Echo exam.  ------------------------------------------------------------------------  Confirmed on  2/14/2018 - 15:24:26 by Petey Cortes M.D.  ------------------------------------------------------------------------    < end of copied text >  ---------------------------------------------------------------------------------------------------------    MICROBIOLOGY:     Rapid Respiratory Viral Panel (02.13.18 @ 17:08)    Rapid RVP Result: [facility]: The [employer] RVP Rapid uses polymerase chain reaction (PCR) and melt  curve analysis to screen for adenovirus; coronavirus HKU1, NL63, 229E,  OC43; human metapneumovirus (hMPV); human enterovirus/rhinovirus  (Entero/RV); influenza A; influenza A/H1;influenza A/H3; influenza  A/H1-2009; influenza B; parainfluenza viruses 1, 2, 3, 4; respiratory  syncytial virus; Bordetella pertussis; Mycoplasma pneumoniae; and  Chlamydophila pneumoniae.      RADIOLOGY:  [x] Chest radiographs reviewed and interpreted by me    < from: Xray Chest 1 View- PORTABLE-Urgent (02.13.18 @ 16:56) >    EXAM:  XR CHEST PORTABLE URGENT 1V                            PROCEDURE DATE:  02/13/2018      INTERPRETATION:  CLINICAL INFORMATION: Shortness of breath for 5 days.    COMPARISON:  No similar studies available for comparison.    TECHNIQUE:AP portable chest xray.    FINDINGS:   Left chest wall Mediport with tip in the SVC.  Surgical clips are seen in the right axilla.    Small left pleural effusion with associated atelectasis.  The right lung is clear.  No evidence of pneumothorax.  Degenerative changes of spine are noted.  The cardiac silhouette is enlarged.    IMPRESSION:  Small left pleural effusion with associated atelectasis.    KASSANDRA MENDEZ M.D., RADIOLOGY RESIDENT  This document has been electronically signed.  HIRAL BAILON M.D., ATTENDING RADIOLOGIST  This document has been electronically signed. Feb 14 2018  9:23AM      < end of copied text >  ---------------------------------------------------------------------------------------------------------    < from: NM Pulmonary Ventilation/Perfusion Scan (02.14.18 @ 10:56) >    EXAM:  NM PULM VENTILATION PERFUS IMG                            PROCEDURE DATE:  02/14/2018          INTERPRETATION:  RADIOPHARMACEUTICAL: 1 mCi Tc-99m-DTPA aerosol by   inhalation; 6 mCi Tc-99m-MAA, I.V.    CLINICAL STATEMENT: 72-year-old female with shortness of breath and   hypoxia.    TECHNIQUE:  Ventilation and perfusion images of the lungs were obtained   following administration of Tc-99m-DTPA and Tc-99m-MAA. Images were   obtained in the anterior, posterior, both lateral, and all 4oblique   projections. The study was interpreted in conjunction with chest   radiograph of 2/13/2018.    FINDINGS: There is heterogeneous tracer distribution in the lungs, worse   on the ventilation images. There are no discrete segmental perfusion   defects.    IMPRESSION: Abnormal ventilation/perfusion lung scan.    Very low probability of pulmonary embolus.    YANELY TILLEY M.D., NUCLEAR MEDICINE ATTENDING  This document has been electronically signed. Feb 14 2018 11:19AM      < end of copied text >  ---------------------------------------------------------------------------------------------------------    < from: CT Abdomen and Pelvis No Cont (02.13.18 @ 20:02) >    EXAM:  CT ABDOMEN AND PELVIS                            PROCEDURE DATE:  02/13/2018            INTERPRETATION:  CLINICAL INFORMATION: Elevated bilirubin. History of   breast cancer. Shortness of breath.    COMPARISON: None.    PROCEDURE:   CT of the Abdomen and Pelvis was performed without intravenous contrast.   Intravenous contrast: None.  Oral contrast: None.  Sagittal and coronal reformats were performed.    FINDINGS:    LOWER CHEST: Small left pleural effusion. Left basilar consolidation may   represent atelectasis or pneumonia. Coronary artery calcifications. Right   mastectomy.    LIVER: Nodular hepatic contour. Multiple bilobar ill-defined hypodense   hepatic lesions compatible with metastatic disease. Two lesions in the   right hepatic lobe demonstrate peripheral calcification.  BILE DUCTS: Normal caliber.  GALLBLADDER: Collapsed and therefore not well evaluated.  SPLEEN: Within normal limits.  PANCREAS: Within normal limits.  ADRENALS: Within normal limits.  KIDNEYS/URETERS: Subcentimeter hyperdense left renal focus too small to   characterize.    BLADDER: Within normal limits.  REPRODUCTIVE ORGANS: Anterior fundal fibroid.    BOWEL: Diverticulosis without diverticulitis. No bowel obstruction.   Appendix is not identified.  PERITONEUM: Moderate volume ascites.  VESSELS:  Atheromatous disease of the aorta.  RETROPERITONEUM: No lymphadenopathy.    ABDOMINAL WALL: Within normal limits.  BONES: Sclerotic osseous metastases in multiple vertebral bodies,   sternum, and ribs.    IMPRESSION:   Bilobar hepatic metastases.  Sclerotic osseous metastases.  Small left pleural effusion. Left basilar consolidation may represent   atelectasis or pneumonia.       ANAMARIA MORRIS M.D., RADIOLOGY RESIDENT  This document has been electronically signed.  MAGDY KEN M.D., ATTENDING RADIOLOGIST  This document has been electronically signed. Feb 13 2018  8:27PM      < end of copied text >  ---------------------------------------------------------------------------------------------------------    < from: US Abdomen Doppler (02.14.18 @ 15:18) >    EXAM:  DUPLEX SCAN EXT VEINS LOWER BI                          EXAM:  US DPLX ABDOMEN                            PROCEDURE DATE:  02/14/2018            INTERPRETATION:  CLINICAL INFORMATION: Increasing liver function tests.   History of metastatic liver disease. Evaluation for portal vein   thrombosis.     EXAM: Focused grayscale, color, and spectral Doppler ultrasound imaging   was performed of the splenic, portal, and hepatic veins.    COMPARISON: CT abdomen pelvis dated 2/13/2018.    FINDINGS:    There is redemonstration of partially imaged metastatic liver disease,   nodular liver contour, and ascites.    The splenic, portal, hepatic veins are patent. There is no evidence of   thrombosis. The splenic and portal veins demonstrate hepatopedal flow.   Main portal vein measures 1.2 cm. The main portal vein has a peak   systolic velocity of 20 cm/s with phasic blood flow. The right and left   portal veins are patent.    Peak systolic velocity in the hepatic artery is 116.7 cm/s.    The intrahepatic IVC is patent.    IMPRESSION:    No evidence of splenic, portal, or hepatic vein thrombosis.    MILTON LAGOS M.D., RADIOLOGY RESIDENT  This document has been electronically signed.  SOFI REYEZ M.D., ATTENDING RADIOLOGIST  This document has been electronically signed. Feb 14 2018  1:00PM      < end of copied text >  ---------------------------------------------------------------------------------------------------------

## 2018-02-15 NOTE — PROGRESS NOTE ADULT - ASSESSMENT
· Assessment		  71 yo female with PMH of breast cancer with mets to lung, liver, bones, s/p mastectomy, s/p depression, HTN, HLD, T2DM, presents with SOB and LE swelling.      ·  Problem: Dyspnea, unspecified type.  Plan: Patient with dyspnea and LE edema, possibly secondary to fluid overload  PE ruled out  leukocytosis likely due to steroid use   s/p IV vancomycin and IV cefepime; we will hold off on further abx at this time, unless patient develops fever or other symptoms suggestive of pneumonia  s/p lasix 40mg IV in ED  hold diuretic 2 to PIERRE.  check echocardiogram  monitor ins/outs and daily weight  cardiology evaluation noted Dr. Amaral     ·  Problem: CHF exacerbation.  Plan: s/p lasix 40 mg IV in ED  check echocardiogram  monitor ins/outs and daily weight   cardiology evaluation noted Dr. Amaral  pulmonary evaluation noted Dr. Baibn      ·  Problem: PIERRE (acute kidney injury).  Plan: baseline creatinine likely 0.8  unclear etiology of PIERRE, ? metastases; CT scan shows hyperdense lesions  will monitor BMP  Nephrology evaluation Dr. Taylor called      ·  Problem: Elevated LFTs.  Plan: likely due to metastasis  no biliary obstruction seen on CT scan  monitor LFTs for now  check US doppler to assess for PVT.     ·  Problem: Lactate blood increase. Plan: Unclear etiology, not likely septic  will repeat lactate level.    Problem: Type 2 diabetes mellitus. Plan: check A1c  sliding scale  on tujeo 40u at bedtime; will start lantus 32u.    ·  Problem: Depression.  Plan: escitalopram.     ·  Problem: Thrombocytopenia.  Plan: platelet was 89 on 2/1/18  ?chemo induced  will monitor for now.     Speech difficulty- discussed with Dr. Whitney. Patient had recent MRI brain normal. Neurology evaluation called Dr. Chelo reza.      ·  Problem: Prophylactic measure.  Plan: no AC at this time, due to rapidly worsening thrombocytopenia  monitor platelet for now.    Metastatic breast cancer- Oncology evaluation Dr. Amparo reza called.      Order for DNR/DNI placed.    Rick Jacobo MD pager [phone]

## 2018-02-15 NOTE — CONSULT NOTE ADULT - SUBJECTIVE AND OBJECTIVE BOX
[facility] Consult Note Nephrology - CONSULTATION NOTE    73 yo female with PMH as mentioned below  presents with SOB and LE swelling.  Patient has been doing well except last 2-3 weeks had SOB and LE edema.  She also had cough, for which she was started on prednisone.  Currently cough has completely resolved.  She was initially started on lasix 2 pills of 20mg daily, which was decreased to 1 pill/daily two weeks ago because patient has been urinating too much.  Over the last 2 weeks, she has been becoming progressively more weak.  Last three days she had worsening of her LE edema to the point that she can't ambulate well.  She denies PND, chest pain, dizziness palpitations, cough, fever or chills, Denies any history of CHF, MI, or valvular diease.  ROS otherwise negative    Upon admission pt found to have pierre ( baseline cr around 0.8 to 1.0mg/dl) with cr of 1.67-->1.7-->2.45. Pt s/p gemcitabine 2 weeks ago. Denies any use of NSAIDS. Was on lasix/triameterene/HCTZ/spirinloactone at home. During hospitalization pt s/p lasix 40mg iv x 1 on 2/13/18. Due to rise in cr lasi xhas been held and pt is off diuretics. CUrrentyly sob has improved  Pt denies any hx of PIERRE in the past    PAST MEDICAL & SURGICAL HISTORY:  Depression  Tremor  Endogenous hyperlipemia  Hypertension  Breast cancer  Type 2 diabetes mellitus  Portacath in place: left upper chest  S/P left cataract extraction  H/O oral surgery: wisdom tooth removal  H/O craniotomy  H/O mastectomy, right    Levaquin (Short breath)  penicillin (Swelling)    Home Medications Reviewed  Hospital Medications:   MEDICATIONS  (STANDING):  dextrose 5%. 1000 milliLiter(s) (50 mL/Hr) IV Continuous <Continuous>  dextrose 50% Injectable 12.5 Gram(s) IV Push once  dextrose 50% Injectable 25 Gram(s) IV Push once  dextrose 50% Injectable 25 Gram(s) IV Push once  escitalopram 20 milliGRAM(s) Oral daily  insulin glargine Injectable (LANTUS) 32 Unit(s) SubCutaneous at bedtime  insulin lispro (HumaLOG) corrective regimen sliding scale   SubCutaneous three times a day before meals  insulin lispro (HumaLOG) corrective regimen sliding scale   SubCutaneous at bedtime  pantoprazole    Tablet 40 milliGRAM(s) Oral before breakfast  predniSONE   Tablet 5 milliGRAM(s) Oral daily  propranolol 10 milliGRAM(s) Oral daily    SOCIAL HISTORY:  Denies ETOh,Smoking,   FAMILY HISTORY:  Family history of breast cancer (Father)    REVIEW OF SYSTEMS:  CONSTITUTIONAL: weakness  EYES/ENT: No visual changes;  No vertigo or throat pain   NECK: No pain or stiffness  RESPIRATORY: +shortness of breath  CARDIOVASCULAR: No chest pain or palpitations.  GASTROINTESTINAL: No abdominal or epigastric pain. No nausea, vomiting, or hematemesis; No diarrhea or constipation. No melena or hematochezia.  GENITOURINARY: No dysuria, frequency, foamy urine, urinary urgency, incontinence or hematuria  NEUROLOGICAL: No numbness or weakness  SKIN: No itching, burning, rashes, or lesions   VASCULAR: No bilateral lower extremity edema.   All other review of systems is negative unless indicated above.    VITALS:  T(F): 97.3 (02-15-18 @ 12:00), Max: 97.6 (02-15-18 @ 04:38)  HR: 76 (02-15-18 @ 12:00)  BP: 133/76 (02-15-18 @ 12:00)  RR: 18 (02-15-18 @ 12:00)  SpO2: 99% (02-15-18 @ 12:00)  Wt(kg): --    02-14 @ 07:01  -  02-15 @ 07:00  --------------------------------------------------------  IN: 830 mL / OUT: 1125 mL / NET: -295 mL    02-15 @ 07:01  -  02-15 @ 13:53  --------------------------------------------------------  IN: 440 mL / OUT: 200 mL / NET: 240 mL        PHYSICAL EXAM:  Constitutional: NAD  HEENT: anicteric sclera, oropharynx clear, MMM  Neck: No JVD  Respiratory: b/l rhonchi  Cardiovascular: S1, S2, RRR  Gastrointestinal: BS+, soft, NT/ND  Extremities: 3+ thigh edema  Neurological: A/O x 3, no focal deficits  Psychiatric: Normal mood, normal affect  : No CVA tenderness. No carty.   Skin: No rashes      LABS:  02-15    137  |  96  |  71<H>  ----------------------------<  116<H>  3.6   |  26  |  2.45<H>    Ca    8.2<L>      15 Feb 2018 07:39  Phos  5.5     02-14  Mg     2.4     02-14    TPro  4.5<L>  /  Alb  2.2<L>  /  TBili  4.8<H>  /  DBili      /  AST  82<H>  /  ALT  41  /  AlkPhos  221<H>  02-14    Creatinine Trend: 2.45 <--, 1.75 <--, 1.67 <--                        10.3   10.92 )-----------( 54       ( 15 Feb 2018 07:39 )             28.7     Urine Studies:      RADIOLOGY & ADDITIONAL STUDIES:

## 2018-02-15 NOTE — CONSULT NOTE ADULT - ASSESSMENT
71 yo female with PMH of breast cancer with mets to lung, liver, bones, s/p mastectomy, depression, HTN, HLD, T2DM, presents with SOB and LE swelling found to have PIERRE and also with elevated LFTs, ascites as well as nodular liver contour 9along with multiple liver mets)    PLAN  -Discussed with IR, there is adequate volume of fluid to tap for paracentesis (diagnostic and therapeutic) but will need to clarify with Renal regarding maximum volume of drainage given PIERRE  -Diuretics on hold given PIERRE  -Ammonia level is WNL, therefore doubt the confusion is related to Hepatic encephalopathy.  -At this point, determination of whether underlying lover dysfunction is due to cirrhosis or if due to extensive liver mets would be more of an academic determination.  -Pt. is now DNR  -Monitor clinically    Discussed with Pulmonary attending, IR team and Medicine team    Thank you for the courtesy of this consult.  Ed Rose PA-C    [facility]  [phone]

## 2018-02-15 NOTE — CONSULT NOTE ADULT - ATTENDING COMMENTS
Pt. Seen. Agree with above assessment and plan    Raymon Babb MD, FACP, FACG, AD[facility]  [phone]
agree with the above assessment and plan by NOEMI Mares.  hold diuretics  edema likely multifactorial  pulm follow up

## 2018-02-15 NOTE — PROGRESS NOTE ADULT - SUBJECTIVE AND OBJECTIVE BOX
CARDIOLOGY FOLLOW UP - Dr. Amaral    CC no chest pain or sob        PHYSICAL EXAM:  T(C): 36.3 (02-15-18 @ 12:00), Max: 36.4 (02-15-18 @ 04:38)  HR: 76 (02-15-18 @ 12:00) (76 - 85)  BP: 133/76 (02-15-18 @ 12:00) (96/60 - 133/76)  RR: 18 (02-15-18 @ 12:00) (18 - 20)  SpO2: 99% (02-15-18 @ 12:00) (98% - 99%)  Wt(kg): --  I&O's Summary    14 Feb 2018 07:01  -  15 Feb 2018 07:00  --------------------------------------------------------  IN: 830 mL / OUT: 1125 mL / NET: -295 mL    15 Feb 2018 07:01  -  15 Feb 2018 17:20  --------------------------------------------------------  IN: 440 mL / OUT: 200 mL / NET: 240 mL        Appearance: Normal	  Cardiovascular: Normal S1 S2,RRR, No JVD, No murmurs  Respiratory:  bl rhonchi   Gastrointestinal:  distended , Non-tender, + BS	  Extremities: Normal range of motion,  bl LE + 3  edema        MEDICATIONS  (STANDING):  dextrose 5%. 1000 milliLiter(s) (50 mL/Hr) IV Continuous <Continuous>  dextrose 50% Injectable 12.5 Gram(s) IV Push once  dextrose 50% Injectable 25 Gram(s) IV Push once  dextrose 50% Injectable 25 Gram(s) IV Push once  escitalopram 20 milliGRAM(s) Oral daily  insulin glargine Injectable (LANTUS) 32 Unit(s) SubCutaneous at bedtime  insulin lispro (HumaLOG) corrective regimen sliding scale   SubCutaneous three times a day before meals  insulin lispro (HumaLOG) corrective regimen sliding scale   SubCutaneous at bedtime  pantoprazole    Tablet 40 milliGRAM(s) Oral before breakfast  predniSONE   Tablet 5 milliGRAM(s) Oral daily  propranolol 10 milliGRAM(s) Oral daily      TELEMETRY: off tele 	    ECG:  	  RADIOLOGY:   DIAGNOSTIC TESTING:  [x ] Echocardiogram:  < from: Transthoracic Echocardiogram (02.14.18 @ 08:59) >  Conclusions:  1. Calcified trileaflet aortic valve with normalopening.  Mild aortic regurgitation.  2. Normal left ventricular systolic function. No segmental  wall motion abnormalities.  3. The right ventricle is not well visualized; grossly  normal right ventricular systolic function.  4. No pericardial effusion seen. Bilateral pleural  effusions. Ascites seen.  *** No previous Echo exam.  ------------------------------------------------------------------------    < end of copied text >    [ ]  Catheterization:  [ ] Stress Test:    OTHER: 	    LABS:	 	                                10.3   10.92 )-----------( 54       ( 15 Feb 2018 07:39 )             28.7     02-15    137  |  96  |  71<H>  ----------------------------<  116<H>  3.6   |  26  |  2.45<H>    Ca    8.2<L>      15 Feb 2018 07:39  Phos  5.5     02-14  Mg     2.4     02-14    TPro  4.5<L>  /  Alb  2.2<L>  /  TBili  4.8<H>  /  DBili  x   /  AST  82<H>  /  ALT  41  /  AlkPhos  221<H>  02-14

## 2018-02-16 ENCOUNTER — RESULT REVIEW (OUTPATIENT)
Age: 73
End: 2018-02-16

## 2018-02-16 LAB
ALBUMIN FLD-MCNC: <0.3 G/DL — SIGNIFICANT CHANGE UP
ANION GAP SERPL CALC-SCNC: 15 MMOL/L — SIGNIFICANT CHANGE UP (ref 5–17)
B PERT IGG+IGM PNL SER: ABNORMAL
BUN SERPL-MCNC: 75 MG/DL — HIGH (ref 7–23)
CALCIUM SERPL-MCNC: 8.1 MG/DL — LOW (ref 8.4–10.5)
CHLORIDE SERPL-SCNC: 95 MMOL/L — LOW (ref 96–108)
CO2 SERPL-SCNC: 26 MMOL/L — SIGNIFICANT CHANGE UP (ref 22–31)
COLOR FLD: YELLOW — SIGNIFICANT CHANGE UP
CREAT SERPL-MCNC: 1.99 MG/DL — HIGH (ref 0.5–1.3)
FLUID INTAKE SUBSTANCE CLASS: SIGNIFICANT CHANGE UP
FLUID SEGMENTED GRANULOCYTES: 14 % — SIGNIFICANT CHANGE UP
GLUCOSE BLDC GLUCOMTR-MCNC: 108 MG/DL — HIGH (ref 70–99)
GLUCOSE BLDC GLUCOMTR-MCNC: 120 MG/DL — HIGH (ref 70–99)
GLUCOSE BLDC GLUCOMTR-MCNC: 147 MG/DL — HIGH (ref 70–99)
GLUCOSE BLDC GLUCOMTR-MCNC: 94 MG/DL — SIGNIFICANT CHANGE UP (ref 70–99)
GLUCOSE FLD-MCNC: 144 MG/DL — SIGNIFICANT CHANGE UP
GLUCOSE SERPL-MCNC: 90 MG/DL — SIGNIFICANT CHANGE UP (ref 70–99)
HCT VFR BLD CALC: 28.4 % — LOW (ref 34.5–45)
HGB BLD-MCNC: 9.5 G/DL — LOW (ref 11.5–15.5)
LDH SERPL L TO P-CCNC: 81 U/L — SIGNIFICANT CHANGE UP
LYMPHOCYTES # FLD: 64 % — SIGNIFICANT CHANGE UP
MCHC RBC-ENTMCNC: 33.5 GM/DL — SIGNIFICANT CHANGE UP (ref 32–36)
MCHC RBC-ENTMCNC: 35.4 PG — HIGH (ref 27–34)
MCV RBC AUTO: 106 FL — HIGH (ref 80–100)
MESOTHL CELL # FLD: 3 % — SIGNIFICANT CHANGE UP
MONOS+MACROS # FLD: 19 % — SIGNIFICANT CHANGE UP
OSMOLALITY UR: 420 MOS/KG — SIGNIFICANT CHANGE UP (ref 50–1200)
PLATELET # BLD AUTO: 64 K/UL — LOW (ref 150–400)
POTASSIUM SERPL-MCNC: 3.5 MMOL/L — SIGNIFICANT CHANGE UP (ref 3.5–5.3)
POTASSIUM SERPL-SCNC: 3.5 MMOL/L — SIGNIFICANT CHANGE UP (ref 3.5–5.3)
POTASSIUM UR-SCNC: 45 MMOL/L — SIGNIFICANT CHANGE UP
PROCALCITONIN SERPL-MCNC: 1.48 NG/ML — HIGH (ref 0–0.04)
PROT FLD-MCNC: <0.6 G/DL — SIGNIFICANT CHANGE UP
RBC # BLD: 2.68 M/UL — LOW (ref 3.8–5.2)
RBC # FLD: 22.5 % — HIGH (ref 10.3–14.5)
RCV VOL RI: 810 /UL — HIGH (ref 0–5)
SODIUM SERPL-SCNC: 136 MMOL/L — SIGNIFICANT CHANGE UP (ref 135–145)
TOTAL NUCLEATED CELL COUNT, BODY FLUID: 104 /UL — HIGH (ref 0–5)
TUBE TYPE: SIGNIFICANT CHANGE UP
WBC # BLD: 11.04 K/UL — HIGH (ref 3.8–10.5)
WBC # FLD AUTO: 11.04 K/UL — HIGH (ref 3.8–10.5)

## 2018-02-16 PROCEDURE — 88305 TISSUE EXAM BY PATHOLOGIST: CPT | Mod: 26

## 2018-02-16 PROCEDURE — 88341 IMHCHEM/IMCYTCHM EA ADD ANTB: CPT | Mod: 26

## 2018-02-16 PROCEDURE — 49083 ABD PARACENTESIS W/IMAGING: CPT

## 2018-02-16 PROCEDURE — 88342 IMHCHEM/IMCYTCHM 1ST ANTB: CPT | Mod: 26

## 2018-02-16 PROCEDURE — 88112 CYTOPATH CELL ENHANCE TECH: CPT | Mod: 26

## 2018-02-16 RX ORDER — BUDESONIDE, MICRONIZED 100 %
0.5 POWDER (GRAM) MISCELLANEOUS EVERY 12 HOURS
Qty: 0 | Refills: 0 | Status: DISCONTINUED | OUTPATIENT
Start: 2018-02-16 | End: 2018-02-25

## 2018-02-16 RX ORDER — ALBUMIN HUMAN 25 %
250 VIAL (ML) INTRAVENOUS EVERY 6 HOURS
Qty: 0 | Refills: 0 | Status: COMPLETED | OUTPATIENT
Start: 2018-02-16 | End: 2018-02-18

## 2018-02-16 RX ORDER — INSULIN GLARGINE 100 [IU]/ML
16 INJECTION, SOLUTION SUBCUTANEOUS ONCE
Qty: 0 | Refills: 0 | Status: COMPLETED | OUTPATIENT
Start: 2018-02-16 | End: 2018-02-16

## 2018-02-16 RX ORDER — NYSTATIN CREAM 100000 [USP'U]/G
1 CREAM TOPICAL
Qty: 0 | Refills: 0 | Status: DISCONTINUED | OUTPATIENT
Start: 2018-02-16 | End: 2018-02-27

## 2018-02-16 RX ORDER — IPRATROPIUM/ALBUTEROL SULFATE 18-103MCG
3 AEROSOL WITH ADAPTER (GRAM) INHALATION EVERY 6 HOURS
Qty: 0 | Refills: 0 | Status: DISCONTINUED | OUTPATIENT
Start: 2018-02-16 | End: 2018-02-27

## 2018-02-16 RX ADMIN — Medication 3 MILLILITER(S): at 12:32

## 2018-02-16 RX ADMIN — INSULIN GLARGINE 16 UNIT(S): 100 INJECTION, SOLUTION SUBCUTANEOUS at 22:58

## 2018-02-16 RX ADMIN — Medication 5 MILLIGRAM(S): at 05:14

## 2018-02-16 RX ADMIN — PANTOPRAZOLE SODIUM 40 MILLIGRAM(S): 20 TABLET, DELAYED RELEASE ORAL at 05:14

## 2018-02-16 RX ADMIN — Medication 125 MILLILITER(S): at 17:52

## 2018-02-16 RX ADMIN — NYSTATIN CREAM 1 APPLICATION(S): 100000 CREAM TOPICAL at 16:47

## 2018-02-16 RX ADMIN — Medication 125 MILLILITER(S): at 12:21

## 2018-02-16 RX ADMIN — ESCITALOPRAM OXALATE 20 MILLIGRAM(S): 10 TABLET, FILM COATED ORAL at 12:32

## 2018-02-16 RX ADMIN — Medication 10 MILLIGRAM(S): at 05:14

## 2018-02-16 NOTE — PROGRESS NOTE ADULT - ASSESSMENT
ASSESSMENT    metastatic breast cancer to the bones, liver and lung s/p mastectomy, craniotomy and chest irradiation - recent decline over the last month punctuated by profound fatigue associated with confusion, slurred speech and memory loss - lower extremity swelling - difficulty walking due to an unsteady gait and leg swelling -    1) extensive metastatic disease to the liver +/- cirrhosis resulting in decreased blood flow through the liver with ascites and lower extremity swelling - no evidence of hepatic encephalopathy  2) shortness of breath is likely related to deconditioning, restrictive lung disease related to radiation pneumonitis, obesity and ascites - i believe the left lower lobe abnormalities are related to the patient's prior chest irradiation rather than new infection - there is little evidence of a pulmonary embolism on V/Q scan -   3) anemia and thrombocytopenia is likely due to bone marrow replacement by tumor - worsening renal function  4) side effects from chemotherapy    PLAN/RECOMMENDATIONS    oxygen supplementation to keep saturation greater than 92%  continue current dose of prednisone  albuterol/atrovent/pulmicort nebs  observe off antibiotics pending procalcitonin level - if level is elevated would consider empiric antibiotics  GI evaluation noted - (?) diagnositic/therapeutic paracentesis  hold diuretics in the absence of pulmonary edema/pleural effusions and with PIERRE  thigh high large ARA stockings  would consider holding zocor  glucose control    Will follow with you. Plan of care discussed with the  at bedside and Dr. Odalis Babin MD, [facility] - [phone]  Pulmonary Medicine

## 2018-02-16 NOTE — CONSULT NOTE ADULT - SUBJECTIVE AND OBJECTIVE BOX
Admitting Diagnosis:  Pneumonia (J18.9): PNEUMONIA, UNSPECIFIED ORGANISM      HPI:  This is a 72y year old Female with the below past medical history who presents with the chief complaint of ams.   PMH of breast cancer with mets to lung, liver, bones, s/p mastectomy, s/p depression, HTN, HLD, T2DM, presents with SOB and LE swelling.  Patient has been doing well except last 2-3 weeks had SOB and LE edema.  She also had cough, for which she was started on prednisone and lasix for LE edema.  She also has unsteady gait, had recent MRI brain at my office on 18 which showed no new mets or stroke.  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)  Hypertension (I10)  Breast cancer (C50.919)  Type 2 diabetes mellitus (E11.9)      Past Surgical History:  Portacath in place (Z95.828): left upper chest  S/P left cataract extraction (Z98.42)  H/O oral surgery (Z98.89): wisdom tooth removal  H/O craniotomy (Z98.89)  H/O mastectomy, right (Z90.11)      Social History:  No toxic habits    Family History:  FAMILY HISTORY:  Family history of breast cancer (Father)      Allergies:  Levaquin (Short breath)  penicillin (Swelling)      ROS:  Constitutional: Patient offers no complaints of fevers or significant weight loss  Ears, Nose, Mouth and Throat: The patient presents with no abnormalities of the head, ears, eyes, nose or throat  Skin: Patient offers no concerns of new rashes or lesions  Respiratory: The patient presents with no abnormalities of the respiratory tract  Cardiovascular: The patient presents with no cardiac abnormalities  Gastrointestinal: The patient presents with no abnormalities of the GI system  Genitourinary: The patient presents with no dysuria, hematuria or frequent urination  Neurological: See HPI  Endocrine: Patient offers no complaints of excessive thirst, urination, or heat/cold intolerance    Advanced care planning reviewed and noted in the chart.    Medications:  dextrose 5%. 1000 milliLiter(s) IV Continuous <Continuous>  dextrose 50% Injectable 12.5 Gram(s) IV Push once  dextrose 50% Injectable 25 Gram(s) IV Push once  dextrose 50% Injectable 25 Gram(s) IV Push once  dextrose Gel 1 Dose(s) Oral once PRN  escitalopram 20 milliGRAM(s) Oral daily  glucagon  Injectable 1 milliGRAM(s) IntraMuscular once PRN  insulin glargine Injectable (LANTUS) 32 Unit(s) SubCutaneous at bedtime  insulin lispro (HumaLOG) corrective regimen sliding scale   SubCutaneous three times a day before meals  insulin lispro (HumaLOG) corrective regimen sliding scale   SubCutaneous at bedtime  pantoprazole    Tablet 40 milliGRAM(s) Oral before breakfast  predniSONE   Tablet 5 milliGRAM(s) Oral daily  propranolol 10 milliGRAM(s) Oral daily      Labs:  CBC Full  -  ( 15 Feb 2018 07:39 )  WBC Count : 10.92 K/uL  Hemoglobin : 10.3 g/dL  Hematocrit : 28.7 %  Platelet Count - Automated : 54 K/uL  Mean Cell Volume : 104.7 fl  Mean Cell Hemoglobin : 37.6 pg  Mean Cell Hemoglobin Concentration : 35.9 gm/dL  Auto Neutrophil # : x  Auto Lymphocyte # : x  Auto Monocyte # : x  Auto Eosinophil # : x  Auto Basophil # : x  Auto Neutrophil % : x  Auto Lymphocyte % : x  Auto Monocyte % : x  Auto Eosinophil % : x  Auto Basophil % : x    02-15    137  |  96  |  71<H>  ----------------------------<  116<H>  3.6   |  26  |  2.45<H>    Ca    8.2<L>      15 Feb 2018 07:39  Phos  5.5       Mg     2.4     -    TPro  4.5<L>  /  Alb  2.2<L>  /  TBili  4.8<H>  /  DBili  x   /  AST  82<H>  /  ALT  41  /  AlkPhos  221<H>  -14    CAPILLARY BLOOD GLUCOSE      POCT Blood Glucose.: 94 mg/dL (2018 07:55)  POCT Blood Glucose.: 128 mg/dL (15 Feb 2018 22:26)  POCT Blood Glucose.: 112 mg/dL (15 Feb 2018 21:15)  POCT Blood Glucose.: 122 mg/dL (15 Feb 2018 16:51)  POCT Blood Glucose.: 137 mg/dL (15 Feb 2018 11:39)    LIVER FUNCTIONS - ( 2018 13:35 )  Alb: 2.2 g/dL / Pro: 4.5 g/dL / ALK PHOS: 221 U/L / ALT: 41 U/L RC / AST: 82 U/L / GGT: x             Urinalysis Basic - ( 15 Feb 2018 16:13 )    Color: Karo / Appearance: Clear / S.016 / pH: x  Gluc: x / Ketone: Negative  / Bili: Small / Urobili: 1.0 mg/dL   Blood: x / Protein: Negative mg/dL / Nitrite: Negative   Leuk Esterase: Negative / RBC: 5 /HPF / WBC 2 /HPF   Sq Epi: x / Non Sq Epi: 21 /HPF / Bacteria: Occasional      Female    Vitals:  Vital Signs Last 24 Hrs  T(C): 36.4 (2018 04:08), Max: 36.4 (15 Feb 2018 20:05)  T(F): 97.5 (2018 04:08), Max: 97.5 (15 Feb 2018 20:05)  HR: 76 (2018 04:08) (76 - 79)  BP: 102/64 (2018 04:08) (97/64 - 133/76)  BP(mean): --  RR: 19 (2018 04:08) (18 - 19)  SpO2: 97% (2018 04:08) (97% - 99%)    NEUROLOGICAL EXAM:    Mental status: Awake, alert, and in no apparent distress. Oriented to person, place and time. Language function is normal. Recent memory, digit span and concentration were normal.     Cranial Nerves: Pupils were equal, round, reactive to light. Extraocular movements were intact. Visual field were full. Fundoscopic exam was deferred. Facial sensation was intact to light touch. There was no facial asymmetry. The palate was upgoing symmetrically and tongue was midline. Hearing acuity was intact to finger rub AU. Shoulder shrug was full bilaterally    Motor exam: Bulk and tone were normal. Strength was 5/5 in all four extremities. Fine finger movements were symmetric and normal. There was no pronator drift    Reflexes: 2+ in the bilateral upper extremities. 2+ in the bilateral lower extremities. Toes were downgoing bilaterally.     Sensation: Intact to light touch, temperature, vibration    Coordination: Finger-nose-finger and heel-to-shin was without dysmetria.     Gait: defered

## 2018-02-16 NOTE — PROGRESS NOTE ADULT - SUBJECTIVE AND OBJECTIVE BOX
Interventional Radiology  Pre-Procedure Note    72 year old male with PMH of breast cancer with metastatic disease.  Acute kidney injury.  Referred for diagnostic and therapeutic paracentesis. Plan for 3-4 L of fluid with replacement of albumin.      PAST MEDICAL & SURGICAL HISTORY:  Depression  Tremor  Endogenous hyperlipemia  Hypertension  Breast cancer  Type 2 diabetes mellitus  Portacath in place: left upper chest  S/P left cataract extraction  H/O oral surgery: wisdom tooth removal  H/O craniotomy  H/O mastectomy, right      Family history of breast cancer (Father)    Allergies: Levaquin (Short breath)  penicillin (Swelling)      Current Medications: albumin human  5% IVPB 250 milliLiter(s) IV Intermittent every 6 hours  ALBUTerol/ipratropium for Nebulization 3 milliLiter(s) Nebulizer every 6 hours  buDESOnide   0.5 milliGRAM(s) Respule 0.5 milliGRAM(s) Inhalation every 12 hours  dextrose 5%. 1000 milliLiter(s) IV Continuous <Continuous>  dextrose 50% Injectable 12.5 Gram(s) IV Push once  dextrose 50% Injectable 25 Gram(s) IV Push once  dextrose 50% Injectable 25 Gram(s) IV Push once  dextrose Gel 1 Dose(s) Oral once PRN  escitalopram 20 milliGRAM(s) Oral daily  glucagon  Injectable 1 milliGRAM(s) IntraMuscular once PRN  insulin glargine Injectable (LANTUS) 32 Unit(s) SubCutaneous at bedtime  insulin lispro (HumaLOG) corrective regimen sliding scale   SubCutaneous three times a day before meals  insulin lispro (HumaLOG) corrective regimen sliding scale   SubCutaneous at bedtime  nystatin Powder 1 Application(s) Topical two times a day  pantoprazole    Tablet 40 milliGRAM(s) Oral before breakfast  predniSONE   Tablet 5 milliGRAM(s) Oral daily  propranolol 10 milliGRAM(s) Oral daily      Labs:                         9.5    11.04 )-----------( 64       ( 16 Feb 2018 09:23 )             28.4       02-16    136  |  95<L>  |  75<H>  ----------------------------<  90  3.5   |  26  |  1.99<H>    Ca    8.1<L>      16 Feb 2018 09:17      INR 1.5 from 2/13/2018    Assessment/Plan:   72 year old female with history of metastatic breast cancer referred for diagnostic and therapeutic paracentesis.    DNR rescinded for the procedure.  Discussed with patient and patient's daughter.    Procedure/ risks/ benefits/ goals/ alternatives were explained. All questions answered. Informed content obtained from patient. Consent placed in chart.

## 2018-02-16 NOTE — PROGRESS NOTE ADULT - SUBJECTIVE AND OBJECTIVE BOX
Patient is a 72y old  Female who presents with a chief complaint of shortness of breath (2018 21:46)      SUBJECTIVE / OVERNIGHT EVENTS: feels better, more alert. Family at bedside.   Review of Systems  chest pain no  palpitations no  sob no  nausea no  headache no    MEDICATIONS  (STANDING):  albumin human  5% IVPB 250 milliLiter(s) IV Intermittent every 6 hours  ALBUTerol/ipratropium for Nebulization 3 milliLiter(s) Nebulizer every 6 hours  buDESOnide   0.5 milliGRAM(s) Respule 0.5 milliGRAM(s) Inhalation every 12 hours  dextrose 5%. 1000 milliLiter(s) (50 mL/Hr) IV Continuous <Continuous>  dextrose 50% Injectable 12.5 Gram(s) IV Push once  dextrose 50% Injectable 25 Gram(s) IV Push once  dextrose 50% Injectable 25 Gram(s) IV Push once  escitalopram 20 milliGRAM(s) Oral daily  insulin glargine Injectable (LANTUS) 32 Unit(s) SubCutaneous at bedtime  insulin lispro (HumaLOG) corrective regimen sliding scale   SubCutaneous three times a day before meals  insulin lispro (HumaLOG) corrective regimen sliding scale   SubCutaneous at bedtime  nystatin Powder 1 Application(s) Topical two times a day  pantoprazole    Tablet 40 milliGRAM(s) Oral before breakfast  predniSONE   Tablet 5 milliGRAM(s) Oral daily  propranolol 10 milliGRAM(s) Oral daily    MEDICATIONS  (PRN):  dextrose Gel 1 Dose(s) Oral once PRN Blood Glucose LESS THAN 70 milliGRAM(s)/deciliter  glucagon  Injectable 1 milliGRAM(s) IntraMuscular once PRN Glucose LESS THAN 70 milligrams/deciliter          PHYSICAL EXAM:  GENERAL: NAD, well-developed  HEAD:  Atraumatic, Normocephalic  EYES: EOMI, PERRLA, conjunctiva and sclera clear  NECK: Supple, No JVD  CHEST/LUNG: Clear to auscultation bilaterally; No wheeze  HEART: Regular rate and rhythm; No murmurs, rubs, or gallops  ABDOMEN: Soft, Nontender, Nondistended; Bowel sounds present  EXTREMITIES:  2+ Peripheral Pulses, No clubbing, cyanosis, 2+ edema  NEUROLOGY: non-focal  SKIN: No rashes or lesions    LABS:                        9.5    11.04 )-----------( 64       ( 2018 09:23 )             28.4     02-16    136  |  95<L>  |  75<H>  ----------------------------<  90  3.5   |  26  |  1.99<H>    Ca    8.1<L>      2018 09:17            Urinalysis Basic - ( 15 Feb 2018 16:13 )    Color: Karo / Appearance: Clear / S.016 / pH: x  Gluc: x / Ketone: Negative  / Bili: Small / Urobili: 1.0 mg/dL   Blood: x / Protein: Negative mg/dL / Nitrite: Negative   Leuk Esterase: Negative / RBC: 5 /HPF / WBC 2 /HPF   Sq Epi: x / Non Sq Epi: 21 /HPF / Bacteria: Occasional        RADIOLOGY & ADDITIONAL TESTS:    Imaging Personally Reviewed:    Consultant(s) Notes Reviewed:      Care Discussed with Consultants/Other Providers:

## 2018-02-16 NOTE — PROGRESS NOTE ADULT - SUBJECTIVE AND OBJECTIVE BOX
Patient seen and examined  no complaints  denies any sob or cp    Levaquin (Short breath)  penicillin (Swelling)    Hospital Medications:   MEDICATIONS  (STANDING):  ALBUTerol/ipratropium for Nebulization 3 milliLiter(s) Nebulizer every 6 hours  buDESOnide   0.5 milliGRAM(s) Respule 0.5 milliGRAM(s) Inhalation every 12 hours  dextrose 5%. 1000 milliLiter(s) (50 mL/Hr) IV Continuous <Continuous>  dextrose 50% Injectable 12.5 Gram(s) IV Push once  dextrose 50% Injectable 25 Gram(s) IV Push once  dextrose 50% Injectable 25 Gram(s) IV Push once  escitalopram 20 milliGRAM(s) Oral daily  insulin glargine Injectable (LANTUS) 32 Unit(s) SubCutaneous at bedtime  insulin lispro (HumaLOG) corrective regimen sliding scale   SubCutaneous three times a day before meals  insulin lispro (HumaLOG) corrective regimen sliding scale   SubCutaneous at bedtime  pantoprazole    Tablet 40 milliGRAM(s) Oral before breakfast  predniSONE   Tablet 5 milliGRAM(s) Oral daily  propranolol 10 milliGRAM(s) Oral daily      VITALS:  T(F): 97.5 (18 @ 04:08), Max: 97.5 (02-15-18 @ 20:05)  HR: 76 (18 @ 04:08)  BP: 102/64 (18 @ 04:08)  RR: 19 (18 @ 04:08)  SpO2: 97% (18 @ 04:08)  Wt(kg): --    02-15 @ 07:01  -   @ 07:00  --------------------------------------------------------  IN: 740 mL / OUT: 875 mL / NET: -135 mL     @ 07:01  -   @ 10:59  --------------------------------------------------------  IN: 120 mL / OUT: 0 mL / NET: 120 mL      PHYSICAL EXAM:  Constitutional: NAD  HEENT: anicteric sclera, oropharynx clear, MMM  Neck: No JVD  Respiratory: b/l rhonchi  Cardiovascular: S1, S2, RRR  Gastrointestinal: BS+, soft, NT/ND  Extremities: 3+ thigh edema  Neurological: A/O x 3, no focal deficits  Psychiatric: Normal mood, normal affect  : No CVA tenderness. No carty.   Skin: No rashes    LABS:      136  |  95<L>  |  75<H>  ----------------------------<  90  3.5   |  26  |  1.99<H>    Ca    8.1<L>      2018 09:17  Phos  5.5       Mg     2.4         TPro  4.5<L>  /  Alb  2.2<L>  /  TBili  4.8<H>  /  DBili      /  AST  82<H>  /  ALT  41  /  AlkPhos  221<H>      Creatinine Trend: 1.99 <--, 2.45 <--, 1.75 <--, 1.67 <--                        10.3   10.92 )-----------( 54       ( 15 Feb 2018 07:39 )             28.7     Urine Studies:  Urinalysis Basic - ( 15 Feb 2018 16:13 )    Color: Karo / Appearance: Clear / S.016 / pH:   Gluc:  / Ketone: Negative  / Bili: Small / Urobili: 1.0 mg/dL   Blood:  / Protein: Negative mg/dL / Nitrite: Negative   Leuk Esterase: Negative / RBC: 5 /HPF / WBC 2 /HPF   Sq Epi:  / Non Sq Epi: 21 /HPF / Bacteria: Occasional      Creatinine, Random Urine: 101 mg/dL (02-15 @ 16:13)  Protein/Creatinine Ratio Calculation: 0.1 Ratio (02-15 @ :13)  Chloride, Random Urine: <20 mmol/L (02-15 @ 16:13)  Sodium, Random Urine: <20 mmol/L (02-15 @ 16:13)  Osmolality, Random Urine: 420 mos/kg (02-15 @ 16:13)  Potassium, Random Urine: 36 mmol/L (02-15 @ 16:13)    RADIOLOGY & ADDITIONAL STUDIES:

## 2018-02-16 NOTE — CONSULT NOTE ADULT - ASSESSMENT
a/p - 73 yo F with hx of metatastic breat CA who complained of unsteady gait in setting of LE edema.  Exam nonfocal, she is not confused.  MRI done of brain on 2/12 no stroke or new mass, has old craniotomy.  Her labs notable for issues with kidney and liver function.  Overall doubt new neurological issue ongoing  Needs to be OOB to chair  PT  DVT prophlyaxis  d/w pt and family

## 2018-02-16 NOTE — PROGRESS NOTE ADULT - ASSESSMENT
73 yo female with PMH of breast cancer with mets to lung, liver, bones, s/p mastectomy, depression, HTN, HLD, T2DM, presents with SOB and LE swelling found to have PIERRE and also with elevated LFTs, ascites as well as nodular liver contour (along with multiple liver mets). At this point, determination of whether underlying liver dysfunction is due to cirrhosis or if due to extensive liver mets would be more of an academic determination.    PLAN  -Await paracentesis (diagnostic and therapeutic)  -Replete Albumin in 1:1 ratio per d/w Nephrology attending  -Diuretics on hold given PIERRE  -Ammonia level is WNL, therefore doubt the confusion is related to Hepatic encephalopathy.  -Pt. is DNR  -Monitor clinically      Ed Rose PA-C    [facility]  [phone]

## 2018-02-16 NOTE — PROGRESS NOTE ADULT - PROBLEM SELECTOR PLAN 1
Pre REnal vs ATN ( gemcitabine /TMA) vs HRS  U lytes noted- intravascularly depleted  Hold off diuretics for now  Start albumin q6h x 2 days  Plan for paracentesis- given U lytes- would limit to 3 to 4L and replace 1:1  trend bmp  monitor urineoutput  keep off nephrotoxins  check urine lytes daily ( urine na/cr/cl/osm/k)

## 2018-02-16 NOTE — PROGRESS NOTE ADULT - ASSESSMENT
· Assessment		  73 yo female with PMH of breast cancer with mets to lung, liver, bones, s/p mastectomy, s/p depression, HTN, HLD, T2DM, presents with SOB and LE swelling.      ·  Problem: Dyspnea, unspecified type.  Plan: Patient with dyspnea and LE edema  PE ruled out  leukocytosis likely due to steroid use   s/p IV vancomycin and IV cefepime; we will hold off on further abx at this time, unless patient develops fever or other symptoms suggestive of pneumonia  s/p lasix 40mg IV in ED  hold diuretic 2 to PIERRE.  check echocardiogram  monitor ins/outs and daily weight  cardiology evaluation noted Dr. Amaral       ·  Problem: CHF exacerbation.  Plan: s/p lasix 40 mg IV in ED  check echocardiogram  monitor ins/outs and daily weight   cardiology evaluation noted Dr. Amaral  pulmonary evaluation noted Dr. Babin      ·  Problem: PIERRE (acute kidney injury).  Plan: baseline creatinine likely 0.8  unclear etiology of PIERRE, ? metastases; CT scan shows hyperdense lesions  will monitor BMP  Nephrology evaluation Dr. Taylor noted.      ·  Problem: Elevated LFTs.  Plan: likely due to metastasis  no biliary obstruction seen on CT scan  monitor LFTs for now  check US doppler to assess for PVT.     ·  Problem: Lactate blood increase. Plan: Unclear etiology, not likely septic  will repeat lactate level.    Problem: Type 2 diabetes mellitus. Plan: check A1c  sliding scale  on tujeo 40u at bedtime; will start lantus 32u.    ·  Problem: Depression.  Plan: escitalopram.     ·  Problem: Thrombocytopenia.  Plan: platelet was 89 on 2/1/18  ?chemo induced  will monitor for now.     Speech difficulty- discussed with Dr. Whitney. Patient had recent MRI brain normal. Neurology evaluation noted.      ·  Problem: Prophylactic measure.  Plan: no AC at this time, due to rapidly worsening thrombocytopenia  monitor platelet for now.    Metastatic breast cancer- Oncology evaluation Dr. Christensen group called.      Order for DNR/DNI placed.  d/w daughter QA    Rick Jacobo MD pager [phone]

## 2018-02-16 NOTE — PROGRESS NOTE ADULT - SUBJECTIVE AND OBJECTIVE BOX
[facility]     PROGRESS NOTE    CHIEF COMPLAINT: dyspnea; hypoxemia; radiation pneumonitis; abnormal CXR; bronchospasm    INTERVAL HISTORY: looks much better - more awake and alert - more talkative - less leg swelling with thigh high ARA stockings; no shortness of breath, cough or sputum production; mild chest congestion and wheeze; no fevers, chills or sweats; no chest pain/pressure or palpitations    REVIEW OF SYSTEMS:  Constitutional: As per interval history  HEENT: Within normal limits  CV: As per interval history  Resp: As per interval history  GI: Within normal limits   : Within normal limits  Musculoskeletal: Within normal limits  Skin: Within normal limits  Neurological: As per interval history  Psychiatric: Within normal limits  Endocrine: Within normal limits  Hematologic/Lymphatic: metastatic breast cancer  Allergic/Immunologic: Within normal limits    MEDICATIONS:     Pulmonary "      Anti-microbials:      Cardiovascular:  propranolol 10 milliGRAM(s) Oral daily      Other:  dextrose 5%. 1000 milliLiter(s) IV Continuous <Continuous>  dextrose 50% Injectable 12.5 Gram(s) IV Push once  dextrose 50% Injectable 25 Gram(s) IV Push once  dextrose 50% Injectable 25 Gram(s) IV Push once  dextrose Gel 1 Dose(s) Oral once PRN  escitalopram 20 milliGRAM(s) Oral daily  glucagon  Injectable 1 milliGRAM(s) IntraMuscular once PRN  insulin glargine Injectable (LANTUS) 32 Unit(s) SubCutaneous at bedtime  insulin lispro (HumaLOG) corrective regimen sliding scale   SubCutaneous three times a day before meals  insulin lispro (HumaLOG) corrective regimen sliding scale   SubCutaneous at bedtime  pantoprazole    Tablet 40 milliGRAM(s) Oral before breakfast  predniSONE   Tablet 5 milliGRAM(s) Oral daily        OBJECTIVE:    I&O's Detail    15 Feb 2018 07:01  -  2018 07:00  --------------------------------------------------------  IN:    Oral Fluid: 740 mL  Total IN: 740 mL    OUT:    Voided: 875 mL  Total OUT: 875 mL    Total NET: -135 mL      POCT Blood Glucose.: 94 mg/dL (2018 07:55)  POCT Blood Glucose.: 128 mg/dL (15 Feb 2018 22:26)  POCT Blood Glucose.: 112 mg/dL (15 Feb 2018 21:15)  POCT Blood Glucose.: 122 mg/dL (15 Feb 2018 16:51)  POCT Blood Glucose.: 137 mg/dL (15 Feb 2018 11:39)      PHYSICAL EXAM:       ICU Vital Signs Last 24 Hrs  T(C): 36.4 (2018 04:08), Max: 36.4 (15 Feb 2018 20:05)  T(F): 97.5 (2018 04:08), Max: 97.5 (15 Feb 2018 20:05)  HR: 76 (2018 04:08) (76 - 79)  BP: 102/64 (2018 04:08) (97/64 - 133/76)  BP(mean): --  ABP: --  ABP(mean): --  RR: 19 (2018 04:08) (18 - 19)  SpO2: 97% (2018 04:08) (97% - 99%) on 2lpm     General: Awake. Alert. Cooperative. No distress. Appears stated age 	  HEENT:   Atraumatic. Normocephalic. Icteric sclerae. Normal oral mucosa, PERRL, EOMI  Neck: Supple. Trachea midline. Thyroid without enlargement/tenderness/nodules. No carotid bruit. No JVD	  Cardiovascular: Regular rate and rhythm. S1 S2 normal. No murmurs, rubs or gallops  Respiratory: Respirations unlabored. Clear to auscultation and percussion bilaterally  Abdomen: Soft. Non-tender. Non-distended. No organomegaly. No masses. Normal bowel sounds	  Extremities: Warm to touch. No clubbing or cyanosis. Decreased lower extremity edema with ARA stockings  Pulses: 2+ peripheral pulses all extremities	  Skin: Normal skin color. No rashes or lesions. No ecchymoses, No cyanosis. Warm to touch  Lymph Nodes: Cervical, supraclavicular and axillary nodes normal  Neurological: Motor and sensory examination equal and normal. A and O x 3  Psychiatry: Appropriate mood and affect.      LABS:                        10.3   10.92 )-----------( 54       ( 15 Feb 2018 07:39 )             28.7                         10.9   11.3  )-----------( 35       ( 2018 13:35 )             30.9     02-15    137  |  96  |  71<H>  ----------------------------<  116<H>  3.6   |  26  |  2.45<H>        135  |  94<L>  |  65<H>  ----------------------------<  162<H>  3.7   |  26  |  1.75<H>    Ca      8.2<L>      02-15    Ca      8.5          Phos    5.5           Mg       2.4         TPro  4.5<L>  /  Alb  2.2<L>  /  TBili  4.8<H>  /  DBili  x   /  AST  82<H>  /  ALT  41  /  AlkPhos  221<H>      TPro  5.3<L>  /  Alb  2.4<L>  /  TBili  5.2<H>  /  DBili  x   /  AST  90<H>  /  ALT  48<H>  /  AlkPhos  244<H>      Ammonia, Serum (18 @ 17:59)    Ammonia, Serum: 47 umol/L    PT/INR - ( 2018 17:08 )   PT: 16.5 sec;   INR: 1.51 ratio       PTT - ( 2018 17:08 )  PTT:31.7 sec    Venous Blood Gas:   @ 13:36  --/--/--/--/--  VBG Lactate: 2.4     @ 23:56  --/--/--/--/--  VBG Lactate: 3.6    Venous Blood Gas:   @ 18:36  7.49/36/26/27/40  VBG Lactate: 4.0    Venous Blood Gas:   @ 17:08  7.41/44/27/28/39  VBG Lactate: 5.0    Serum Pro-Brain Natriuretic Peptide: 2480 pg/mL ( @ 17:08)    CARDIAC MARKERS ( 2018 17:08 )  x     / <0.01 ng/mL / x     / x     / x        < from: Transthoracic Echocardiogram (18 @ 08:59) >    Patient name: STELLA SIMMONS  YOB: 1945   Age: 72 (F)   MR#: [id number]  Study Date: 2018  [id number]  Study quality: Technically difficult  Referring Physician: Rick Jacobo MD  Blood Pressure: 124/72 mmHg  Height: 163 cm  Weight: 93 kg  BSA: 2 m2  ------------------------------------------------------------------------  PROCEDURE: Transthoracic echocardiogram with 2-D, M-Mode  and complete spectral and color flow Doppler.  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 Mild mitral  regurgitation.  Aortic Valve/Aorta: Calcified trileaflet aortic valve with  normal opening. Mild aortic regurgitation.  Normal aortic root size. (Ao: 2.9 cm at the sinuses of  Valsalva).  Left Atrium: Normal left atrium.  LA volume index = 26  cc/m2.  Left Ventricle: Normal left ventricular systolic function.  No segmental wall motion abnormalities. Normal left  ventricular internal dimensions and wall thicknesses.  Right Heart: Normal right atrium. The right ventricle is  not well visualized; grossly normal right ventricular  systolic function. Normal tricuspid valve. Mild-moderate  tricuspid regurgitation. Pulmonic valve not well  visualized, probably normal. Minimal pulmonic  regurgitation.  Pericardium/Pleura: No pericardial effusion seen. Bilateral  pleural effusions. Ascites seen.  Hemodynamic: Estimated right atrial pressure is 8 mm Hg.  Estimated right ventricular systolic pressure equals 42 mm  Hg, assuming right atrial pressure equals 8 mm Hg,  consistent with mild pulmonary hypertension.  ------------------------------------------------------------------------  Conclusions:  1. Calcified trileaflet aortic valve with normalopening.  Mild aortic regurgitation.  2. Normal left ventricular systolic function. No segmental  wall motion abnormalities.  3. The right ventricle is not well visualized; grossly  normal right ventricular systolic function.  4. No pericardial effusion seen. Bilateral pleural  effusions. Ascites seen.  *** No previous Echo exam.  ------------------------------------------------------------------------  Confirmed on  2018 - 15:24:26 by Petey Cortes M.D.  ------------------------------------------------------------------------    < end of copied text >  ------------------------------------------------------------------------------------------------    MICROBIOLOGY:   Urinalysis Basic - ( 15 Feb 2018 16:13 )    Color: Karo / Appearance: Clear / S.016 / pH: x  Gluc: x / Ketone: Negative  / Bili: Small / Urobili: 1.0 mg/dL   Blood: x / Protein: Negative mg/dL / Nitrite: Negative   Leuk Esterase: Negative / RBC: 5 /HPF / WBC 2 /HPF   Sq Epi: x / Non Sq Epi: 21 /HPF / Bacteria: Occasional    Rapid Respiratory Viral Panel (18 @ 17:08)    Rapid RVP Result: NotDete: The Film[employer] RVP Rapid uses polymerase chain reaction (PCR) and melt  curve analysis to screen for adenovirus; coronavirus HKU1, NL63, 229E,  OC43; human metapneumovirus (hMPV); human enterovirus/rhinovirus  (Entero/RV); influenza A; influenza A/H1;influenza A/H3; influenza  A/H1-2009; influenza B; parainfluenza viruses 1, 2, 3, 4; respiratory  syncytial virus; Bordetella pertussis; Mycoplasma pneumoniae; and  Chlamydophila pneumoniae.      RADIOLOGY:  [x] Chest radiographs reviewed and interpreted by me    < from: US Renal (02.15.18 @ 16:27) >    EXAM:  US KIDNEY(S)                            PROCEDURE DATE:  02/15/2018      INTERPRETATION:  CLINICAL INFORMATION: Acute kidney injury.    COMPARISON: CT abdomen pelvis 2018    TECHNIQUE: Sonography of the kidneys and bladder.     FINDINGS:    Right kidney: 9.3 cm. Increased echogenicity with cortical thinning No   renal mass, hydronephrosis or calculi.    Left kidney:  10.8 cm. Increased echogenicity with cortical thinning. No   renal mass, hydronephrosis or calculi.    Urinary bladder: Within normal limits.    Miscellaneous: Hepatic lesions consistent with known metastases are noted   as well as moderate findings ascites and a left pleural effusion.    IMPRESSION:     Echogenic kidneys consistent with renal parenchymal disease. No   hydronephrosis.    JARETT DAVIS M.D., ATTENDING RADIOLOGIST  This document has been electronically signed. Feb 15 2018  4:23PM      < end of copied text >  ---------------------------------------------------------------------------------------------------------  < from: NM Pulmonary Ventilation/Perfusion Scan (18 @ 10:56) >    EXAM:  NM PULM VENTILATION PERFUS IMG                            PROCEDURE DATE:  2018      INTERPRETATION:  RADIOPHARMACEUTICAL: 1 mCi Tc-99m-DTPA aerosol by   inhalation; 6 mCi Tc-99m-MAA, I.V.    CLINICAL STATEMENT: 72-year-old female with shortness of breath and   hypoxia.    IMPRESSION: Abnormal ventilation/perfusion lung scan.    Very low probability of pulmonary embolus.    YANELY TILLEY M.D., NUCLEAR MEDICINE ATTENDING  This document has been electronically signed. 2018 11:19AM      < end of copied text >  ---------------------------------------------------------------------------------------------------------    < from: CT Abdomen and Pelvis No Cont (18 @ 20:02) >    EXAM:  CT ABDOMEN AND PELVIS                            PROCEDURE DATE:  2018        INTERPRETATION:  CLINICAL INFORMATION: Elevated bilirubin. History of   breast cancer. Shortness of breath.    COMPARISON: None.    PROCEDURE:   CT of the Abdomen and Pelvis was performed without intravenous contrast.   Intravenous contrast: None.  Oral contrast: None.  Sagittal and coronal reformats were performed.    FINDINGS:    LOWER CHEST: Small left pleural effusion. Left basilar consolidation may   represent atelectasis or pneumonia. Coronary artery calcifications. Right   mastectomy.    LIVER: Nodular hepatic contour. Multiple bilobar ill-defined hypodense   hepatic lesions compatible with metastatic disease. Two lesions in the   right hepatic lobe demonstrate peripheral calcification.  BILE DUCTS: Normal caliber.  GALLBLADDER: Collapsed and therefore not well evaluated.  SPLEEN: Within normal limits.  PANCREAS: Within normal limits.  ADRENALS: Within normal limits.  KIDNEYS/URETERS: Subcentimeter hyperdense left renal focus too small to   characterize.    BLADDER: Within normal limits.  REPRODUCTIVE ORGANS: Anterior fundal fibroid.    BOWEL: Diverticulosis without diverticulitis. No bowel obstruction.   Appendix is not identified.  PERITONEUM: Moderate volume ascites.  VESSELS:  Atheromatous disease of the aorta.  RETROPERITONEUM: No lymphadenopathy.    ABDOMINAL WALL: Within normal limits.  BONES: Sclerotic osseous metastases in multiple vertebral bodies,   sternum, and ribs.    IMPRESSION:   Bilobar hepatic metastases.  Sclerotic osseous metastases.  Small left pleural effusion. Left basilar consolidation may represent   atelectasis or pneumonia.       ANAMARIA MORRIS M.D., RADIOLOGY RESIDENT  This document has been electronically signed.  MAGDY KEN M.D., ATTENDING RADIOLOGIST  This document has been electronically signed. 2018  8:27PM      < end of copied text >  ---------------------------------------------------------------------------------------------------------    < from: US Abdomen Doppler (18 @ 15:18) >    EXAM:  DUPLEX SCAN EXT VEINS LOWER BI                          EXAM:  US DPLX ABDOMEN                          PROCEDURE DATE:  2018      INTERPRETATION:  CLINICAL INFORMATION: Increasing liver function tests.   History of metastatic liver disease. Evaluation for portal vein   thrombosis.     EXAM: Focused grayscale, color, and spectral Doppler ultrasound imaging   was performed of the splenic, portal, and hepatic veins.    COMPARISON: CT abdomen pelvis dated 2018.    FINDINGS:    There is redemonstration of partially imaged metastatic liver disease,   nodular liver contour, and ascites.    The splenic, portal, hepatic veins are patent. There is no evidence of   thrombosis. The splenic and portal veins demonstrate hepatopedal flow.   Main portal vein measures 1.2 cm. The main portal vein has a peak   systolic velocity of 20 cm/s with phasic blood flow. The right and left   portal veins are patent.    Peak systolic velocity in the hepatic artery is 116.7 cm/s.    The intrahepatic IVC is patent.    IMPRESSION:    No evidence of splenic, portal, or hepatic vein thrombosis.    MILTON LAGOS M.D., RADIOLOGY RESIDENT  This document has been electronically signed.  SOFI REYEZ M.D., ATTENDING RADIOLOGIST  This document has been electronically signed. 2018  1:00PM      < end of copied text >  ---------------------------------------------------------------------------------------------------------

## 2018-02-16 NOTE — CONSULT NOTE ADULT - SUBJECTIVE AND OBJECTIVE BOX
Patient is a 72y old  Female who presents with a chief complaint of shortness of breath (13 Feb 2018 21:46)      HPI:  71 yo female with PMH of breast cancer with mets to lung, liver, bones, s/p mastectomy, s/p depression, HTN, HLD, T2DM, presents with SOB and LE swelling.  Patient has been doing well except last 2-3 weeks had SOB and LE edema.  She also had cough, for which she was started on prednisone.  Currently cough has completely resolved.  She was initially started on lasix 2 pills of 20mg daily, which was decreased to 1 pill/daily two weeks ago because patient has been urinating too much.  Over the last 2 weeks, she has been becoming progressively more weak.  Last three days she had worsening of her LE edema to the point that she can't ambulate well.  She can't walk more than a few feet without having dyspnea.  She now has to sleep on a recliner.  She denies PND.  She also has unsteady gait, had recent MRI brain which was apparently negative.  She admits to drinking 3-4 bottles of water and 1-2 cans of gingerale daily.  She denies any chest pain, dizziness, blurry vision, cough, fever, urinary or bowel changes. (13 Feb 2018 21:46)  Most recently getting Gemcytabine q  2 weeks. Held 2 weeks ago because of above evolving symptoms.       ROS:  Negative except for:    PAST MEDICAL & SURGICAL HISTORY:  Depression  Tremor  Endogenous hyperlipemia  Hypertension  Breast cancer  Type 2 diabetes mellitus  Portacath in place: left upper chest  S/P left cataract extraction  H/O oral surgery: wisdom tooth removal  H/O craniotomy  H/O mastectomy, right      SOCIAL HISTORY:    FAMILY HISTORY:  Family history of breast cancer (Father)      MEDICATIONS  (STANDING):  dextrose 5%. 1000 milliLiter(s) (50 mL/Hr) IV Continuous <Continuous>  dextrose 50% Injectable 12.5 Gram(s) IV Push once  dextrose 50% Injectable 25 Gram(s) IV Push once  dextrose 50% Injectable 25 Gram(s) IV Push once  escitalopram 20 milliGRAM(s) Oral daily  insulin glargine Injectable (LANTUS) 32 Unit(s) SubCutaneous at bedtime  insulin lispro (HumaLOG) corrective regimen sliding scale   SubCutaneous three times a day before meals  insulin lispro (HumaLOG) corrective regimen sliding scale   SubCutaneous at bedtime  pantoprazole    Tablet 40 milliGRAM(s) Oral before breakfast  predniSONE   Tablet 5 milliGRAM(s) Oral daily  propranolol 10 milliGRAM(s) Oral daily    MEDICATIONS  (PRN):  dextrose Gel 1 Dose(s) Oral once PRN Blood Glucose LESS THAN 70 milliGRAM(s)/deciliter  glucagon  Injectable 1 milliGRAM(s) IntraMuscular once PRN Glucose LESS THAN 70 milligrams/deciliter      Allergies    Levaquin (Short breath)  penicillin (Swelling)    Intolerances    Vital Signs Last 24 Hrs  T(C): 36.4 (16 Feb 2018 04:08), Max: 36.4 (15 Feb 2018 20:05)  T(F): 97.5 (16 Feb 2018 04:08), Max: 97.5 (15 Feb 2018 20:05)  HR: 76 (16 Feb 2018 04:08) (76 - 79)  BP: 102/64 (16 Feb 2018 04:08) (97/64 - 133/76)  BP(mean): --  RR: 19 (16 Feb 2018 04:08) (18 - 19)  SpO2: 97% (16 Feb 2018 04:08) (97% - 99%)    PHYSICAL EXAM  General: adult in NAD, weakened condition  HEENT: clear oropharynx, anicteric sclera, pink conjunctiva  Neck: supple  CV: normal S1/S2 with no murmur rubs or gallops  Lungs: positive air movement b/l ant lungs,clear to auscultation, no wheezes, no rales  Abdomen: obese, soft non-tender non-distended, no hepatosplenomegaly  Ext: 1+ leg edema, no clubbing cyanosis   Skin:  no rashes and no petechiae  Neuro: alert and oriented X 4, no focal deficits      LABS:                          10.3   10.92 )-----------( 54       ( 15 Feb 2018 07:39 )             28.7         Mean Cell Volume : 104.7 fl  Mean Cell Hemoglobin : 37.6 pg  Mean Cell Hemoglobin Concentration : 35.9 gm/dL  Auto Neutrophil # : x  Auto Lymphocyte # : x  Auto Monocyte # : x  Auto Eosinophil # : x  Auto Basophil # : x  Auto Neutrophil % : x  Auto Lymphocyte % : x  Auto Monocyte % : x  Auto Eosinophil % : x  Auto Basophil % : x      02-15    137  |  96  |  71<H>  ----------------------------<  116<H>  3.6   |  26  |  2.45<H>    Ca    8.2<L>      15 Feb 2018 07:39  Phos  5.5     02-14  Mg     2.4     02-14    TPro  4.5<L>  /  Alb  2.2<L>  /  TBili  4.8<H>  /  DBili  x   /  AST  82<H>  /  ALT  41  /  AlkPhos  221<H>  02-14      RADIOLOGY & ADDITIONAL STUDIES:  < from: US Renal (02.15.18 @ 16:27) >  EXAM:  US KIDNEY(S)                            PROCEDURE DATE:  02/15/2018            INTERPRETATION:  CLINICAL INFORMATION: Acute kidney injury.    COMPARISON: CT abdomen pelvis 2/13/2018    TECHNIQUE: Sonography of the kidneys and bladder.     FINDINGS:    Right kidney: 9.3 cm. Increased echogenicity with cortical thinning No   renal mass, hydronephrosis or calculi.    Left kidney:  10.8 cm. Increased echogenicity with cortical thinning. No   renal mass, hydronephrosis or calculi.    Urinary bladder: Within normal limits.    Miscellaneous: Hepatic lesions consistent with known metastases are noted   as well as moderate findings ascites and a left pleural effusion.    IMPRESSION:     Echogenic kidneys consistent with renal parenchymal disease. No   hydronephrosis.    < from: [facility] Duplex Lower Ext Vein Scan, Bilat (02.14.18 @ 16:32) >  EXAM:  DUPLEX SCAN EXT VEINS LOWER BI                          EXAM:  US DPLX ABDOMEN                            PROCEDURE DATE:  02/14/2018            INTERPRETATION:  CLINICAL INFORMATION: Increasing liver function tests.   History of metastatic liver disease. Evaluation for portal vein   thrombosis.     EXAM: Focused grayscale, color, and spectral Doppler ultrasound imaging   was performed of the splenic, portal, and hepatic veins.    COMPARISON: CT abdomen pelvis dated 2/13/2018.    FINDINGS:    There is redemonstration of partially imaged metastatic liver disease,   nodular liver contour, and ascites.    The splenic, portal, hepatic veins are patent. There is no evidence of   thrombosis. The splenic and portal veins demonstrate hepatopedal flow.   Main portal vein measures 1.2 cm. The main portal vein has a peak   systolic velocity of 20 cm/s with phasic blood flow. The right and left   portal veins are patent.    Peak systolic velocity in the hepatic artery is 116.7 cm/s.    The intrahepatic IVC is patent.    IMPRESSION:    No evidence of splenic, portal, or hepatic vein thrombosis.    < from: NM Pulmonary Ventilation/Perfusion Scan (02.14.18 @ 10:56) >  XAM:  NM PULM VENTILATION PERFUS IMG                                PROCEDURE DATE:  02/14/2018          INTERPRETATION:  RADIOPHARMACEUTICAL: 1 mCi Tc-99m-DTPA aerosol by   inhalation; 6 mCi Tc-99m-MAA, I.V.    CLINICAL STATEMENT: 72-year-old female with shortness of breath and   hypoxia.    TECHNIQUE:  Ventilation and perfusion images of the lungs were obtained   following administration of Tc-99m-DTPA and Tc-99m-MAA. Images were   obtained in the anterior, posterior, both lateral, and all 4oblique   projections. The study was interpreted in conjunction with chest   radiograph of 2/13/2018.    FINDINGS: There is heterogeneous tracer distribution in the lungs, worse   on the ventilation images. There are no discrete segmental perfusion   defects.    IMPRESSION: Abnormal ventilation/perfusion lung scan.    Very low probability of pulmonary embolus.            < from: CT Abdomen and Pelvis No Cont (02.13.18 @ 20:02) >  EXAM:  CT ABDOMEN AND PELVIS                            PROCEDURE DATE:  02/13/2018            INTERPRETATION:  CLINICAL INFORMATION: Elevated bilirubin. History of   breast cancer. Shortness of breath.    COMPARISON: None.    PROCEDURE:   CT of the Abdomen and Pelvis was performed without intravenous contrast.   Intravenous contrast: None.  Oral contrast: None.  Sagittal and coronal reformats were performed.    FINDINGS:    LOWER CHEST: Small left pleural effusion. Left basilar consolidation may   represent atelectasis or pneumonia. Coronary artery calcifications. Right   mastectomy.    LIVER: Nodular hepatic contour. Multiple bilobar ill-defined hypodense   hepatic lesions compatible with metastatic disease. Two lesions in the   right hepatic lobe demonstrate peripheral calcification.  BILE DUCTS: Normal caliber.  GALLBLADDER: Collapsed and therefore not well evaluated.  SPLEEN: Within normal limits.  PANCREAS: Within normal limits.  ADRENALS: Within normal limits.  KIDNEYS/URETERS: Subcentimeter hyperdense left renal focus too small to   characterize.    BLADDER: Within normal limits.  REPRODUCTIVE ORGANS: Anterior fundal fibroid.    BOWEL: Diverticulosis without diverticulitis. No bowel obstruction.   Appendix is not identified.  PERITONEUM: Moderate volume ascites.  VESSELS:  Atheromatous disease of the aorta.  RETROPERITONEUM: No lymphadenopathy.    ABDOMINAL WALL: Within normal limits.  BONES: Sclerotic osseous metastases in multiple vertebral bodies,   sternum, and ribs.    IMPRESSION:   Bilobar hepatic metastases.  Sclerotic osseous metastases.  Small left pleural effusion. Left basilar consolidation may represent   atelectasis or pneumonia.   end of copied text >  < end of copied text >    < from: Xray Chest 1 View- PORTABLE-Urgent (02.13.18 @ 16:56) >  XAM:  XR CHEST PORTABLE URGENT 1V                            PROCEDURE DATE:  02/13/2018            INTERPRETATION:  CLINICAL INFORMATION: Shortness of breath for 5 days.    COMPARISON:  No similar studies available for comparison.    TECHNIQUE:AP portable chest xray.    FINDINGS:   Left chest wall Mediport with tip in the SVC.  Surgical clips are seen in the right axilla.    Small left pleural effusion with associated atelectasis.  The right lung is clear.  No evidence of pneumothorax.  Degenerative changes of spine are noted.  The cardiac silhouette is enlarged.    IMPRESSION:  Small left pleural effusion with associated atelectasis.

## 2018-02-16 NOTE — PROGRESS NOTE ADULT - SUBJECTIVE AND OBJECTIVE BOX
Patient is a 72y old  Female who presented with a chief complaint of shortness of breath (2018 21:46)      INTERVAL HPI/OVERNIGHT EVENTS:  no new events  still feeling very "weak"  +SOB  no nausea or vomiting  no abdominal pain    MEDICATIONS  (STANDING):  albumin human  5% IVPB 250 milliLiter(s) IV Intermittent every 6 hours  ALBUTerol/ipratropium for Nebulization 3 milliLiter(s) Nebulizer every 6 hours  buDESOnide   0.5 milliGRAM(s) Respule 0.5 milliGRAM(s) Inhalation every 12 hours  dextrose 5%. 1000 milliLiter(s) (50 mL/Hr) IV Continuous <Continuous>  dextrose 50% Injectable 12.5 Gram(s) IV Push once  dextrose 50% Injectable 25 Gram(s) IV Push once  dextrose 50% Injectable 25 Gram(s) IV Push once  escitalopram 20 milliGRAM(s) Oral daily  insulin glargine Injectable (LANTUS) 32 Unit(s) SubCutaneous at bedtime  insulin lispro (HumaLOG) corrective regimen sliding scale   SubCutaneous three times a day before meals  insulin lispro (HumaLOG) corrective regimen sliding scale   SubCutaneous at bedtime  nystatin Powder 1 Application(s) Topical two times a day  pantoprazole    Tablet 40 milliGRAM(s) Oral before breakfast  predniSONE   Tablet 5 milliGRAM(s) Oral daily  propranolol 10 milliGRAM(s) Oral daily    MEDICATIONS  (PRN):  dextrose Gel 1 Dose(s) Oral once PRN Blood Glucose LESS THAN 70 milliGRAM(s)/deciliter  glucagon  Injectable 1 milliGRAM(s) IntraMuscular once PRN Glucose LESS THAN 70 milligrams/deciliter      Allergies    Levaquin (Short breath)  penicillin (Swelling)    Review of Systems:  General:  +generalized fatigue No fever or chills   CV:  No pain, palpitations, hypo/hypertension  Resp:  +cough 9now resolved) +SOB  :  No pain, bleeding, incontinence, nocturia  Muscle:  No pain, +generalized weakness  Psych:  +generalized fatigue  Endocrine:  No polyuria, polydypsia, cold/heat intolerance  Heme:  +met breast CA  Skin:  no rash, jaundice +edema    Vital Signs Last 24 Hrs  T(C): 36.3 (2018 11:48), Max: 36.4 (15 Feb 2018 20:05)  T(F): 97.3 (2018 11:48), Max: 97.5 (15 Feb 2018 20:05)  HR: 69 (2018 11:48) (69 - 79)  BP: 114/64 (2018 11:48) (97/64 - 114/64)  BP(mean): --  RR: 18 (2018 11:48) (18 - 19)  SpO2: 97% (2018 11:48) (97% - 98%)    PHYSICAL EXAM:  Constitutional: elderly female lying in bed  Neck: No LAD, supple, no JVD  Respiratory: decreased BS at bases, no rales or wheeze  Chest wall: +rt. mastectomy, Lt. mediport  Cardiovascular: S1 and S2, RRR  Gastrointestinal: BS+, softly distended, NT no rebound, guarding or rigidity  Extremities: ++edema (+ARA stocking in place)  Neurological: drowsy but responsive no focal asymmetry  Skin: No rashes, anicteric    LABS:                        9.5    11.04 )-----------( 64       ( 2018 09:23 )             28.4     02-16    136  |  95<L>  |  75<H>  ----------------------------<  90  3.5   |  26  |  1.99<H>    Ca    8.1<L>      2018 09:17        Urinalysis Basic - ( 15 Feb 2018 16:13 )    Color: Karo / Appearance: Clear / S.016 / pH: x  Gluc: x / Ketone: Negative  / Bili: Small / Urobili: 1.0 mg/dL   Blood: x / Protein: Negative mg/dL / Nitrite: Negative   Leuk Esterase: Negative / RBC: 5 /HPF / WBC 2 /HPF   Sq Epi: x / Non Sq Epi: 21 /HPF / Bacteria: Occasional      LIVER FUNCTIONS - ( 2018 13:35 )  Alb: 2.2 g/dL / Pro: 4.5 g/dL / ALK PHOS: 221 U/L / ALT: 41 U/L RC / AST: 82 U/L / GGT: x             RADIOLOGY & ADDITIONAL TESTS:

## 2018-02-16 NOTE — PROGRESS NOTE ADULT - ASSESSMENT
72 year old female with breast cancer with mets to lung, liver, bones, s/p mastectomy, s/p depression, HTN, HLD, T2DM admitted with LE edema/ SOB     1. cv stable   no chest pain or sob.  EKG revealing no evidence of acute ischemia/ACS   echo revealing normal lv sys fx     2. SOB   likely secondary to pulm disease   echo revealing normal lv sys fx   VQ scan revealing low probability of PE   chest xray revealing sml Left pleural effusion   continue to hold lasix given PIERRE , no evidence of decomp CHF on exam   pulm f/u     3. LE edema   likely secondary to hypoalbuminemia / extensive mets disease   Ct abd/pelvis revealing bilobar hepatic mets/ sclerotic osseous mets/ sml left pleural effusion   LE duplex negative for thrombosis   lasix on hold given PIERRE   med/ onc f/u   plan for albumin x 2 days     4 htn   bp low- normal   if remains low would dc propranolol       5. elevated LFTS  gi f/u   s/p paracentesis  with replacement of albumin    6. PIERRE   lasix on hold   plan for albumin   renal f.u     dvt ppx

## 2018-02-16 NOTE — CONSULT NOTE ADULT - ASSESSMENT
breast cancer metastatic to liver, bone admitted with  progressive weakness, fatigue, SOB, failure to thrive. Etiology not clear. Felt to have progressive disease on Gemcitabine Had recent brain MRI reportedly normal.  family notes  she is having some difficulty swallowing. Suspect an as yet undefined metabolic abnormality, paraneoplastic syndrome, atypical toxicity from Gemcitabine Has moderate ascites, agree with paracentesis for diagnostic determination .. Await neuro consult, screen for paraneoplastic syndrome. Renal and pulmonary consults ongoing. Check Adrenal function. breast cancer metastatic to liver, bone admitted with  progressive weakness, fatigue, SOB, failure to thrive. Etiology not clear. Felt to have progressive disease on Gemcitabine Had recent brain MRI reportedly normal.  family notes  she is having some difficulty swallowing. Suspect an as yet undefined metabolic abnormality, paraneoplastic syndrome, atypical toxicity from Gemcitabine Has moderate ascites, agree with paracentesis for diagnostic determination .. Await neuro consult, screen for paraneoplastic syndrome. Renal and pulmonary consults ongoing. Check Adrenal function. Get swallowing evaluation

## 2018-02-17 DIAGNOSIS — C50.919 MALIGNANT NEOPLASM OF UNSPECIFIED SITE OF UNSPECIFIED FEMALE BREAST: ICD-10-CM

## 2018-02-17 LAB
ANION GAP SERPL CALC-SCNC: 15 MMOL/L — SIGNIFICANT CHANGE UP (ref 5–17)
BUN SERPL-MCNC: 80 MG/DL — HIGH (ref 7–23)
CALCIUM SERPL-MCNC: 8.5 MG/DL — SIGNIFICANT CHANGE UP (ref 8.4–10.5)
CHLORIDE SERPL-SCNC: 96 MMOL/L — SIGNIFICANT CHANGE UP (ref 96–108)
CHLORIDE UR-SCNC: <20 MMOL/L — SIGNIFICANT CHANGE UP
CO2 SERPL-SCNC: 25 MMOL/L — SIGNIFICANT CHANGE UP (ref 22–31)
CREAT ?TM UR-MCNC: 118 MG/DL — SIGNIFICANT CHANGE UP
CREAT SERPL-MCNC: 2.01 MG/DL — HIGH (ref 0.5–1.3)
GLUCOSE BLDC GLUCOMTR-MCNC: 107 MG/DL — HIGH (ref 70–99)
GLUCOSE BLDC GLUCOMTR-MCNC: 153 MG/DL — HIGH (ref 70–99)
GLUCOSE BLDC GLUCOMTR-MCNC: 159 MG/DL — HIGH (ref 70–99)
GLUCOSE BLDC GLUCOMTR-MCNC: 169 MG/DL — HIGH (ref 70–99)
GLUCOSE SERPL-MCNC: 107 MG/DL — HIGH (ref 70–99)
GRAM STN FLD: SIGNIFICANT CHANGE UP
HCT VFR BLD CALC: 25 % — LOW (ref 34.5–45)
HGB BLD-MCNC: 8.5 G/DL — LOW (ref 11.5–15.5)
MCHC RBC-ENTMCNC: 34 GM/DL — SIGNIFICANT CHANGE UP (ref 32–36)
MCHC RBC-ENTMCNC: 35.9 PG — HIGH (ref 27–34)
MCV RBC AUTO: 105.5 FL — HIGH (ref 80–100)
NIGHT BLUE STAIN TISS: SIGNIFICANT CHANGE UP
OSMOLALITY UR: 386 MOS/KG — SIGNIFICANT CHANGE UP (ref 50–1200)
PLATELET # BLD AUTO: 49 K/UL — LOW (ref 150–400)
POTASSIUM SERPL-MCNC: 3.6 MMOL/L — SIGNIFICANT CHANGE UP (ref 3.5–5.3)
POTASSIUM SERPL-SCNC: 3.6 MMOL/L — SIGNIFICANT CHANGE UP (ref 3.5–5.3)
POTASSIUM UR-SCNC: 49 MMOL/L — SIGNIFICANT CHANGE UP
RBC # BLD: 2.37 M/UL — LOW (ref 3.8–5.2)
RBC # FLD: 22.8 % — HIGH (ref 10.3–14.5)
SODIUM SERPL-SCNC: 136 MMOL/L — SIGNIFICANT CHANGE UP (ref 135–145)
SODIUM UR-SCNC: <20 MMOL/L — SIGNIFICANT CHANGE UP
SPECIMEN SOURCE: SIGNIFICANT CHANGE UP
SPECIMEN SOURCE: SIGNIFICANT CHANGE UP
WBC # BLD: 7.89 K/UL — SIGNIFICANT CHANGE UP (ref 3.8–10.5)
WBC # FLD AUTO: 7.89 K/UL — SIGNIFICANT CHANGE UP (ref 3.8–10.5)

## 2018-02-17 RX ORDER — IPRATROPIUM/ALBUTEROL SULFATE 18-103MCG
3 AEROSOL WITH ADAPTER (GRAM) INHALATION ONCE
Qty: 0 | Refills: 0 | Status: COMPLETED | OUTPATIENT
Start: 2018-02-17 | End: 2018-02-17

## 2018-02-17 RX ADMIN — Medication 3 MILLILITER(S): at 00:52

## 2018-02-17 RX ADMIN — Medication 125 MILLILITER(S): at 00:44

## 2018-02-17 RX ADMIN — NYSTATIN CREAM 1 APPLICATION(S): 100000 CREAM TOPICAL at 06:28

## 2018-02-17 RX ADMIN — Medication 125 MILLILITER(S): at 06:22

## 2018-02-17 RX ADMIN — Medication 125 MILLILITER(S): at 12:25

## 2018-02-17 RX ADMIN — Medication 0.5 MILLIGRAM(S): at 09:00

## 2018-02-17 RX ADMIN — Medication 3 MILLILITER(S): at 23:21

## 2018-02-17 RX ADMIN — ESCITALOPRAM OXALATE 20 MILLIGRAM(S): 10 TABLET, FILM COATED ORAL at 12:25

## 2018-02-17 RX ADMIN — Medication 3 MILLILITER(S): at 12:25

## 2018-02-17 RX ADMIN — Medication 3 MILLILITER(S): at 16:52

## 2018-02-17 RX ADMIN — NYSTATIN CREAM 1 APPLICATION(S): 100000 CREAM TOPICAL at 18:08

## 2018-02-17 RX ADMIN — Medication 3 MILLILITER(S): at 18:08

## 2018-02-17 RX ADMIN — Medication 1: at 13:45

## 2018-02-17 RX ADMIN — Medication 5 MILLIGRAM(S): at 06:28

## 2018-02-17 RX ADMIN — INSULIN GLARGINE 32 UNIT(S): 100 INJECTION, SOLUTION SUBCUTANEOUS at 21:11

## 2018-02-17 RX ADMIN — Medication 3 MILLILITER(S): at 06:31

## 2018-02-17 RX ADMIN — Medication 125 MILLILITER(S): at 18:30

## 2018-02-17 RX ADMIN — Medication 1: at 16:52

## 2018-02-17 RX ADMIN — PANTOPRAZOLE SODIUM 40 MILLIGRAM(S): 20 TABLET, DELAYED RELEASE ORAL at 06:28

## 2018-02-17 RX ADMIN — Medication 0.5 MILLIGRAM(S): at 18:08

## 2018-02-17 NOTE — PROGRESS NOTE ADULT - PROBLEM SELECTOR PLAN 1
--- Long d/w pt's  and daughter --- unclear if symptoms pt is experiencing are result of her underlying disease or if related to gemcitabine chemotherapy which can cause fluid overload  --- will review old records re: disease and treatment    Pt's  is unsure of pt's progression of liver mets -- as per last discussion with dr Christensen, all results were stable.  Will review office records to help family decide re: pt disposition

## 2018-02-17 NOTE — PROGRESS NOTE ADULT - SUBJECTIVE AND OBJECTIVE BOX
CC: no cp/sob        PHYSICAL EXAM:    T(C): 36.8 (02-17-18 @ 04:56), Max: 36.9 (02-16-18 @ 20:10)  HR: 77 (02-17-18 @ 06:11) (69 - 82)  BP: 97/65 (02-17-18 @ 06:11) (95/65 - 114/64)  RR: 18 (02-17-18 @ 04:56) (18 - 18)  SpO2: 96% (02-17-18 @ 04:56) (96% - 98%)  Wt(kg): --  I&O's Summary    16 Feb 2018 07:01  -  17 Feb 2018 07:00  --------------------------------------------------------  IN: 480 mL / OUT: 350 mL / NET: 130 mL        Appearance: Normal	  Cardiovascular: Normal S1 S2,RRR, No JVD, No murmurs  Respiratory: Lungs clear to auscultation	  Gastrointestinal:  Soft, Non-tender, + BS	  Extremities: + edema  Vascular: Peripheral pulses palpable 2+ bilaterally     LABS:	 	                          9.5    11.04 )-----------( 64       ( 16 Feb 2018 09:23 )             28.4     02-16    136  |  95<L>  |  75<H>  ----------------------------<  90  3.5   |  26  |  1.99<H>    Ca    8.1<L>      16 Feb 2018 09:17            CARDIAC MARKERS:        MEDICATIONS  (STANDING):  albumin human  5% IVPB 250 milliLiter(s) IV Intermittent every 6 hours  ALBUTerol/ipratropium for Nebulization 3 milliLiter(s) Nebulizer every 6 hours  buDESOnide   0.5 milliGRAM(s) Respule 0.5 milliGRAM(s) Inhalation every 12 hours  dextrose 5%. 1000 milliLiter(s) (50 mL/Hr) IV Continuous <Continuous>  dextrose 50% Injectable 12.5 Gram(s) IV Push once  dextrose 50% Injectable 25 Gram(s) IV Push once  dextrose 50% Injectable 25 Gram(s) IV Push once  escitalopram 20 milliGRAM(s) Oral daily  insulin glargine Injectable (LANTUS) 32 Unit(s) SubCutaneous at bedtime  insulin lispro (HumaLOG) corrective regimen sliding scale   SubCutaneous three times a day before meals  insulin lispro (HumaLOG) corrective regimen sliding scale   SubCutaneous at bedtime  nystatin Powder 1 Application(s) Topical two times a day  pantoprazole    Tablet 40 milliGRAM(s) Oral before breakfast  predniSONE   Tablet 5 milliGRAM(s) Oral daily  propranolol 10 milliGRAM(s) Oral daily

## 2018-02-17 NOTE — PROGRESS NOTE ADULT - SUBJECTIVE AND OBJECTIVE BOX
Patient is a 72y old  Female who presents with a chief complaint of shortness of breath (2018 21:46)      SUBJECTIVE / OVERNIGHT EVENTS: weak, tired.  at bedside.  Review of Systems  chest pain no  palpitations no  sob no  nausea no  headache no    MEDICATIONS  (STANDING):  albumin human  5% IVPB 250 milliLiter(s) IV Intermittent every 6 hours  ALBUTerol/ipratropium for Nebulization 3 milliLiter(s) Nebulizer every 6 hours  buDESOnide   0.5 milliGRAM(s) Respule 0.5 milliGRAM(s) Inhalation every 12 hours  dextrose 5%. 1000 milliLiter(s) (50 mL/Hr) IV Continuous <Continuous>  dextrose 50% Injectable 12.5 Gram(s) IV Push once  dextrose 50% Injectable 25 Gram(s) IV Push once  dextrose 50% Injectable 25 Gram(s) IV Push once  escitalopram 20 milliGRAM(s) Oral daily  insulin glargine Injectable (LANTUS) 32 Unit(s) SubCutaneous at bedtime  insulin lispro (HumaLOG) corrective regimen sliding scale   SubCutaneous three times a day before meals  insulin lispro (HumaLOG) corrective regimen sliding scale   SubCutaneous at bedtime  nystatin Powder 1 Application(s) Topical two times a day  pantoprazole    Tablet 40 milliGRAM(s) Oral before breakfast  predniSONE   Tablet 5 milliGRAM(s) Oral daily  propranolol 10 milliGRAM(s) Oral daily    MEDICATIONS  (PRN):  dextrose Gel 1 Dose(s) Oral once PRN Blood Glucose LESS THAN 70 milliGRAM(s)/deciliter  glucagon  Injectable 1 milliGRAM(s) IntraMuscular once PRN Glucose LESS THAN 70 milligrams/deciliter          PHYSICAL EXAM:  GENERAL: NAD, well-developed  HEAD:  Atraumatic, Normocephalic  EYES: EOMI, PERRLA, conjunctiva and sclera clear  NECK: Supple, No JVD  CHEST/LUNG: Clear to auscultation bilaterally; No wheeze  HEART: Regular rate and rhythm; No murmurs, rubs, or gallops  ABDOMEN: Soft, Nontender, Nondistended; Bowel sounds present  EXTREMITIES:  2+ Peripheral Pulses, No clubbing, cyanosis, 2+edema  NEUROLOGY: non-focal  SKIN: No rashes or lesions    LABS:                        8.5    7.89  )-----------( 49       ( 2018 08:54 )             25.0     02-17    136  |  96  |  80<H>  ----------------------------<  107<H>  3.6   |  25  |  2.01<H>    Ca    8.5      2018 08:54            Urinalysis Basic - ( 15 Feb 2018 16:13 )    Color: Karo / Appearance: Clear / S.016 / pH: x  Gluc: x / Ketone: Negative  / Bili: Small / Urobili: 1.0 mg/dL   Blood: x / Protein: Negative mg/dL / Nitrite: Negative   Leuk Esterase: Negative / RBC: 5 /HPF / WBC 2 /HPF   Sq Epi: x / Non Sq Epi: 21 /HPF / Bacteria: Occasional        RADIOLOGY & ADDITIONAL TESTS:    Imaging Personally Reviewed:    Consultant(s) Notes Reviewed:      Care Discussed with Consultants/Other Providers:

## 2018-02-17 NOTE — PROGRESS NOTE ADULT - PROBLEM SELECTOR PLAN 1
Pre REnal vs ATN ( gemcitabine /TMA) vs HRS. no hydro on renal sono-reviewed. inc echogenicity, likely underlying CKD  U lytes noted- intravascularly depleted. off diuretics for now  Cr improving, stable  no indication for RRT/dialysis at this time  Started albumin 2/16 q6h x 2 days  Plan for paracentesis- given U lytes- would limit to 3 to 4L and replace 1:1  trend bmp daily  monitor urineoutput, 650ml past 24hrs noted  keep off nephrotoxins  check urine lytes daily ( urine na/cr/cl/osm/k)  dose all meds for eGFR <15ml/min  avoid ACEi/ARB/NSAIDs/nephrotoxics

## 2018-02-17 NOTE — PROGRESS NOTE ADULT - SUBJECTIVE AND OBJECTIVE BOX
Patient is a 72y old  Female who presents with a chief complaint of shortness of breath (13 Feb 2018 21:46)       Ms Umana had a paracentesis yesterday -- no improvement in breathing -- has not yet been out of bed to chair --- still with significant swelling in legs, abdomen --- as per family, appetite fair    REVIEW OF SYSTEMS:    CONSTITUTIONAL: (+) weakness, No fevers or chills  EYES/ENT: No visual changes;  No vertigo or throat pain   NECK: No pain or stiffness  RESPIRATORY: No cough, wheezing, hemoptysis; No shortness of breath  CARDIOVASCULAR: No chest pain or palpitations  GASTROINTESTINAL: No abdominal or epigastric pain. No nausea, vomiting, or hematemesis; No diarrhea or constipation. No melena or hematochezia.  GENITOURINARY: No dysuria, frequency or hematuria  NEUROLOGICAL: No numbness or weakness  SKIN: No itching, burning, rashes, or lesions     MEDICATIONS  (STANDING):  albumin human  5% IVPB 250 milliLiter(s) IV Intermittent every 6 hours  ALBUTerol/ipratropium for Nebulization 3 milliLiter(s) Nebulizer every 6 hours  buDESOnide   0.5 milliGRAM(s) Respule 0.5 milliGRAM(s) Inhalation every 12 hours  dextrose 5%. 1000 milliLiter(s) (50 mL/Hr) IV Continuous <Continuous>  dextrose 50% Injectable 12.5 Gram(s) IV Push once  dextrose 50% Injectable 25 Gram(s) IV Push once  dextrose 50% Injectable 25 Gram(s) IV Push once  escitalopram 20 milliGRAM(s) Oral daily  insulin glargine Injectable (LANTUS) 32 Unit(s) SubCutaneous at bedtime  insulin lispro (HumaLOG) corrective regimen sliding scale   SubCutaneous three times a day before meals  insulin lispro (HumaLOG) corrective regimen sliding scale   SubCutaneous at bedtime  nystatin Powder 1 Application(s) Topical two times a day  pantoprazole    Tablet 40 milliGRAM(s) Oral before breakfast  predniSONE   Tablet 5 milliGRAM(s) Oral daily  propranolol 10 milliGRAM(s) Oral daily    MEDICATIONS  (PRN):  dextrose Gel 1 Dose(s) Oral once PRN Blood Glucose LESS THAN 70 milliGRAM(s)/deciliter  glucagon  Injectable 1 milliGRAM(s) IntraMuscular once PRN Glucose LESS THAN 70 milligrams/deciliter      Allergies    Levaquin (Short breath)  penicillin (Swelling)    Intolerances        Vital Signs Last 24 Hrs  T(C): 36.8 (17 Feb 2018 04:56), Max: 36.9 (16 Feb 2018 20:10)  T(F): 98.2 (17 Feb 2018 04:56), Max: 98.5 (16 Feb 2018 20:10)  HR: 80 (17 Feb 2018 12:20) (75 - 82)  BP: 104/67 (17 Feb 2018 12:20) (95/65 - 104/67)  BP(mean): 75 (16 Feb 2018 20:10) (75 - 75)  RR: 18 (17 Feb 2018 04:56) (18 - 18)  SpO2: 96% (17 Feb 2018 04:56) (96% - 98%)    PHYSICAL EXAM  General: adult in NAD  HEENT: clear oropharynx, anicteric sclera, pink conjunctiva  Neck: supple  CV: normal S1/S2 with no murmur rubs or gallops  Lungs: positive air movement b/l ant lungs,clear to auscultation, no wheezes, no rales  Abdomen: soft non-tender non-distended, no hepatosplenomegaly appreciated, (+) abdominal wall edema;  Ext: no clubbing cyanosis; (+)2  edema  Skin: no rashes and no petechiae  Neuro: alert and oriented X 4, no focal deficits      LABS:                        8.5    7.89  )-----------( 49       ( 17 Feb 2018 08:54 )             25.0       Mean Cell Volume : 105.5 fl  Mean Cell Hemoglobin : 35.9 pg  Mean Cell Hemoglobin Concentration : 34.0 gm/dL    Serial CBC's  02-17 @ 08:54  Hct-25.0 / Hgb-8.5 / Plat-49 / RBC-2.37 / WBC-7.89      Serial CBC's  02-16 @ 09:23  Hct-28.4 / Hgb-9.5 / Plat-64 / RBC-2.68 / WBC-11.04      Serial CBC's  02-15 @ 07:39  Hct-28.7 / Hgb-10.3 / Plat-54 / RBC-2.74 / WBC-10.92      Serial CBC's  02-14 @ 13:35  Hct-30.9 / Hgb-10.9 / Plat-35 / RBC-2.85 / WBC-11.3      Serial CBC's  02-13 @ 17:08  Hct-35.6 / Hgb-11.6 / Plat-43 / RBC-3.25 / WBC-15.2        02-17    136  |  96  |  80<H>  ----------------------------<  107<H>  3.6   |  25  |  2.01<H>    Ca    8.5      17 Feb 2018 08:54          Assessment

## 2018-02-17 NOTE — PROGRESS NOTE ADULT - ASSESSMENT
71 yo female with PMH of breast cancer with mets to lung, liver, bones, s/p mastectomy, s/p depression, HTN, HLD, T2DM, presents with SOB and LE swelling found to have PIERRE. Renal following for PIERRE    labs reviewed  wheezing-on nebs bid, rec to give a dose now, NP informed  consider CXR

## 2018-02-17 NOTE — PROGRESS NOTE ADULT - SUBJECTIVE AND OBJECTIVE BOX
Patient seen and examined bedside    no complaints today  denies any sob or cp  family -2 daughters and  bedside  ate better today per family    Levaquin (Short breath)  penicillin (Swelling)    Hospital Medications:   MEDICATIONS  (STANDING):  ALBUTerol/ipratropium for Nebulization 3 milliLiter(s) Nebulizer every 6 hours  buDESOnide   0.5 milliGRAM(s) Respule 0.5 milliGRAM(s) Inhalation every 12 hours  dextrose 5%. 1000 milliLiter(s) (50 mL/Hr) IV Continuous <Continuous>  dextrose 50% Injectable 12.5 Gram(s) IV Push once  dextrose 50% Injectable 25 Gram(s) IV Push once  dextrose 50% Injectable 25 Gram(s) IV Push once  escitalopram 20 milliGRAM(s) Oral daily  insulin glargine Injectable (LANTUS) 32 Unit(s) SubCutaneous at bedtime  insulin lispro (HumaLOG) corrective regimen sliding scale   SubCutaneous three times a day before meals  insulin lispro (HumaLOG) corrective regimen sliding scale   SubCutaneous at bedtime  pantoprazole    Tablet 40 milliGRAM(s) Oral before breakfast  predniSONE   Tablet 5 milliGRAM(s) Oral daily  propranolol 10 milliGRAM(s) Oral daily      VITALS:  Vital Signs Last 24 Hrs  T(C): 36.9 (2018 14:23), Max: 36.9 (2018 20:10)  T(F): 98.5 (2018 14:23), Max: 98.5 (2018 20:10)  HR: 82 (2018 14:23) (76 - 82)  BP: 118/76 (2018 14:23) (95/65 - 118/76)  BP(mean): 75 (2018 20:10) (75 - 75)  RR: 18 (2018 14:23) (18 - 18)  SpO2: 99% (2018 14:23) (96% - 99%)    I&O's Summary    2018 07:01  -  2018 07:00  --------------------------------------------------------  IN: 640 mL / OUT: 650 mL / NET: -10 mL    2018 07:01  -  2018 16:48  --------------------------------------------------------  IN: 0 mL / OUT: 150 mL / NET: -150 mL      PHYSICAL EXAM:  Constitutional: NAD  HEENT: anicteric sclera, oropharynx clear, MMM  Neck: No JVD  Respiratory: rt lung mild wheezing, dec bibasilar BS, no crepts appreciated  Cardiovascular: S1, S2, RRR  Gastrointestinal: BS+, soft, NT, obese  Extremities: 3+ pitting edema b/l  Neurological: A/O x 3, no focal deficits  Psychiatric: Normal mood, normal affect  : No CVA tenderness. No carty.   Skin: No rashes    LABS:      136  |  96  |  80<H>  ----------------------------<  107<H>  3.6   |  25  |  2.01<H>    Ca    8.5      2018 08:54    Creatinine Trend: 2.0<-1.99 <--, 2.45 <--, 1.75 <--, 1.67 <--                                8.5    7.89  )-----------( 49       ( 2018 08:54 )             25.0       Urine Studies:  Urinalysis Basic - ( 15 Feb 2018 16:13 )    Color: Karo / Appearance: Clear / S.016 / pH:   Gluc:  / Ketone: Negative  / Bili: Small / Urobili: 1.0 mg/dL   Blood:  / Protein: Negative mg/dL / Nitrite: Negative   Leuk Esterase: Negative / RBC: 5 /HPF / WBC 2 /HPF   Sq Epi:  / Non Sq Epi: 21 /HPF / Bacteria: Occasional    Creatinine, Random Urine: 101 mg/dL (02-15 @ 16:13)  Protein/Creatinine Ratio Calculation: 0.1 Ratio (02-15 @ 16:13)  Chloride, Random Urine: <20 mmol/L (02-15 @ 16:13)  Sodium, Random Urine: <20 mmol/L (02-15 @ 16:13)  Osmolality, Random Urine: 420 mos/kg (02-15 @ 16:13)  Potassium, Random Urine: 36 mmol/L (02-15 @ 16:13)    RADIOLOGY & ADDITIONAL STUDIES:

## 2018-02-17 NOTE — PROGRESS NOTE ADULT - ASSESSMENT
· Assessment		  71 yo female with PMH of breast cancer with mets to lung, liver, bones, s/p mastectomy, s/p depression, HTN, HLD, T2DM, presents with SOB and LE swelling.      ·  Problem: Dyspnea, unspecified type.  Plan: Patient with dyspnea and LE edema  PE ruled out  leukocytosis likely due to steroid use   s/p IV vancomycin and IV cefepime; we will hold off on further abx at this time, unless patient develops fever or other symptoms suggestive of pneumonia  s/p lasix 40mg IV in ED  hold diuretic 2 to PIERRE.  check echocardiogram  monitor ins/outs and daily weight  cardiology follow Dr. Amaral       ·  Problem: CHF exacerbation.  Plan: s/p lasix 40 mg IV in ED  check echocardiogram  monitor ins/outs and daily weight   cardiology follow Dr. Amaral  pulmonary follow Dr. Babin      ·  Problem: PIERRE (acute kidney injury).  Plan: baseline creatinine likely 0.8  unclear etiology of PIERRE, ? metastases; CT scan shows hyperdense lesions  will monitor BMP  Nephrology evaluation Dr. Taylor noted.  supplement albumin      ·  Problem: Elevated LFTs.  Plan: likely due to metastasis  no biliary obstruction seen on CT scan  monitor LFTs for now      ·  Problem: Lactate blood increase. Plan: Unclear etiology, not likely septic  will repeat lactate level.    Problem: Type 2 diabetes mellitus. Plan: check A1c  sliding scale  on tujeo 40u at bedtime; will start lantus 32u.    ·  Problem: Depression.  Plan: escitalopram.     ·  Problem: Thrombocytopenia.  Plan: platelet was 89 on 2/1/18  ?chemo induced  will monitor for now.     Speech difficulty- discussed with Dr. Whitney. Patient had recent MRI brain normal. Neurology evaluation noted.      ·  Problem: Prophylactic measure.  Plan: no AC at this time, due to rapidly worsening thrombocytopenia  monitor platelet for now.    Metastatic breast cancer- Oncology evaluation Dr. Christensen group called.      Order for DNR/DNI placed.  d/w  QA    Rick Jacobo MD pager [phone]

## 2018-02-17 NOTE — PROGRESS NOTE ADULT - ASSESSMENT
71 y/o female with known metastatic breast ca to liver since 2012.  Unclear if disease is progressing (will need to review office notes, scans) or if fluid overload related to gemzar therapy (which can lead to edema); Pt's platelets are also low along with a dropping hemoglobin.  Her last chemotherapy was 2 weeks ago.

## 2018-02-18 DIAGNOSIS — R79.89 OTHER SPECIFIED ABNORMAL FINDINGS OF BLOOD CHEMISTRY: ICD-10-CM

## 2018-02-18 LAB
ANION GAP SERPL CALC-SCNC: 14 MMOL/L — SIGNIFICANT CHANGE UP (ref 5–17)
BUN SERPL-MCNC: 78 MG/DL — HIGH (ref 7–23)
CALCIUM SERPL-MCNC: 9 MG/DL — SIGNIFICANT CHANGE UP (ref 8.4–10.5)
CHLORIDE SERPL-SCNC: 95 MMOL/L — LOW (ref 96–108)
CHLORIDE UR-SCNC: <35 MMOL/L — SIGNIFICANT CHANGE UP
CO2 SERPL-SCNC: 28 MMOL/L — SIGNIFICANT CHANGE UP (ref 22–31)
CREAT ?TM UR-MCNC: 127 MG/DL — SIGNIFICANT CHANGE UP
CREAT SERPL-MCNC: 1.94 MG/DL — HIGH (ref 0.5–1.3)
GLUCOSE BLDC GLUCOMTR-MCNC: 102 MG/DL — HIGH (ref 70–99)
GLUCOSE BLDC GLUCOMTR-MCNC: 111 MG/DL — HIGH (ref 70–99)
GLUCOSE BLDC GLUCOMTR-MCNC: 111 MG/DL — HIGH (ref 70–99)
GLUCOSE BLDC GLUCOMTR-MCNC: 112 MG/DL — HIGH (ref 70–99)
GLUCOSE BLDC GLUCOMTR-MCNC: 142 MG/DL — HIGH (ref 70–99)
GLUCOSE SERPL-MCNC: 102 MG/DL — HIGH (ref 70–99)
HAPTOGLOB SERPL-MCNC: <20 MG/DL — LOW (ref 34–200)
HCT VFR BLD CALC: 25.2 % — LOW (ref 34.5–45)
HGB BLD-MCNC: 8.8 G/DL — LOW (ref 11.5–15.5)
LDH SERPL L TO P-CCNC: 341 U/L — HIGH (ref 50–242)
MCHC RBC-ENTMCNC: 35 GM/DL — SIGNIFICANT CHANGE UP (ref 32–36)
MCHC RBC-ENTMCNC: 38.4 PG — HIGH (ref 27–34)
MCV RBC AUTO: 110 FL — HIGH (ref 80–100)
PLATELET # BLD AUTO: 49 K/UL — LOW (ref 150–400)
POTASSIUM SERPL-MCNC: 3.7 MMOL/L — SIGNIFICANT CHANGE UP (ref 3.5–5.3)
POTASSIUM SERPL-SCNC: 3.7 MMOL/L — SIGNIFICANT CHANGE UP (ref 3.5–5.3)
POTASSIUM UR-SCNC: 44 MMOL/L — SIGNIFICANT CHANGE UP
RBC # BLD: 2.3 M/UL — LOW (ref 3.8–5.2)
RBC # BLD: 2.31 M/UL — LOW (ref 3.8–5.2)
RBC # FLD: 20.7 % — HIGH (ref 10.3–14.5)
RETICS #: 75 K/UL — SIGNIFICANT CHANGE UP (ref 25–125)
RETICS/RBC NFR: 3.2 % — HIGH (ref 0.5–2.5)
SODIUM SERPL-SCNC: 137 MMOL/L — SIGNIFICANT CHANGE UP (ref 135–145)
SODIUM UR-SCNC: <20 MMOL/L — SIGNIFICANT CHANGE UP
WBC # BLD: 7.4 K/UL — SIGNIFICANT CHANGE UP (ref 3.8–10.5)
WBC # FLD AUTO: 7.4 K/UL — SIGNIFICANT CHANGE UP (ref 3.8–10.5)

## 2018-02-18 PROCEDURE — 74018 RADEX ABDOMEN 1 VIEW: CPT | Mod: 26

## 2018-02-18 PROCEDURE — 71045 X-RAY EXAM CHEST 1 VIEW: CPT | Mod: 26

## 2018-02-18 RX ORDER — INSULIN GLARGINE 100 [IU]/ML
15 INJECTION, SOLUTION SUBCUTANEOUS ONCE
Qty: 0 | Refills: 0 | Status: COMPLETED | OUTPATIENT
Start: 2018-02-18 | End: 2018-02-18

## 2018-02-18 RX ORDER — DOCUSATE SODIUM 100 MG
100 CAPSULE ORAL THREE TIMES A DAY
Qty: 0 | Refills: 0 | Status: DISCONTINUED | OUTPATIENT
Start: 2018-02-18 | End: 2018-02-25

## 2018-02-18 RX ORDER — SENNA PLUS 8.6 MG/1
2 TABLET ORAL AT BEDTIME
Qty: 0 | Refills: 0 | Status: DISCONTINUED | OUTPATIENT
Start: 2018-02-18 | End: 2018-02-25

## 2018-02-18 RX ORDER — POLYETHYLENE GLYCOL 3350 17 G/17G
17 POWDER, FOR SOLUTION ORAL AT BEDTIME
Qty: 0 | Refills: 0 | Status: DISCONTINUED | OUTPATIENT
Start: 2018-02-18 | End: 2018-02-25

## 2018-02-18 RX ADMIN — Medication 100 MILLIGRAM(S): at 23:22

## 2018-02-18 RX ADMIN — PANTOPRAZOLE SODIUM 40 MILLIGRAM(S): 20 TABLET, DELAYED RELEASE ORAL at 06:20

## 2018-02-18 RX ADMIN — Medication 125 MILLILITER(S): at 00:19

## 2018-02-18 RX ADMIN — Medication 0.5 MILLIGRAM(S): at 18:20

## 2018-02-18 RX ADMIN — ESCITALOPRAM OXALATE 20 MILLIGRAM(S): 10 TABLET, FILM COATED ORAL at 13:28

## 2018-02-18 RX ADMIN — Medication 5 MILLIGRAM(S): at 06:21

## 2018-02-18 RX ADMIN — NYSTATIN CREAM 1 APPLICATION(S): 100000 CREAM TOPICAL at 06:20

## 2018-02-18 RX ADMIN — Medication 3 MILLILITER(S): at 23:22

## 2018-02-18 RX ADMIN — NYSTATIN CREAM 1 APPLICATION(S): 100000 CREAM TOPICAL at 17:46

## 2018-02-18 RX ADMIN — Medication 3 MILLILITER(S): at 12:36

## 2018-02-18 RX ADMIN — Medication 100 MILLIGRAM(S): at 13:28

## 2018-02-18 RX ADMIN — Medication 3 MILLILITER(S): at 18:26

## 2018-02-18 RX ADMIN — Medication 0.5 MILLIGRAM(S): at 06:20

## 2018-02-18 RX ADMIN — Medication 10 MILLIGRAM(S): at 13:28

## 2018-02-18 RX ADMIN — Medication 125 MILLILITER(S): at 06:32

## 2018-02-18 RX ADMIN — INSULIN GLARGINE 15 UNIT(S): 100 INJECTION, SOLUTION SUBCUTANEOUS at 23:21

## 2018-02-18 RX ADMIN — POLYETHYLENE GLYCOL 3350 17 GRAM(S): 17 POWDER, FOR SOLUTION ORAL at 13:28

## 2018-02-18 RX ADMIN — Medication 3 MILLILITER(S): at 06:20

## 2018-02-18 NOTE — SWALLOW BEDSIDE ASSESSMENT ADULT - SLP GENERAL OBSERVATIONS
Pt asleep in bed with daughter at bedside who reports Pt has been sleeping a lot today with reduced PO intake; Dgtr also reports change in breathing today as clinician noted efforted breathing pattern at rest with slight expiratory wheeze; Pt easily aroused to verbal and tactile stim and increased work of breathing noted throughout exam.  Pt answered simple questions and served primarily as respondent. Respiratory rate approximately 20..

## 2018-02-18 NOTE — SWALLOW BEDSIDE ASSESSMENT ADULT - SWALLOW EVAL: DIAGNOSIS
Pt presents with oropharyngeal dysphagia superimposed upon by reduced respiratory status.  Oral holding noted in presence of increased work of breathing.  Expiratory wheeze perceived at baseline and judged to be worse following sips of thin liquids.  Pt does not wish to pursue objective testing (ie FEES) at this time.  In addition, xerostomia may be a contributing factor to reduced speech intelligibility although nasal air escape also perceived during phonation.

## 2018-02-18 NOTE — SWALLOW BEDSIDE ASSESSMENT ADULT - ASR SWALLOW ASPIRATION MONITOR
pneumonia/upper respiratory infection/*If evident, report to MD and consider NPO, with non-oral nutrition, hydration, medications vs Palliative Care consult to determine the GOC with re: to provision of nutrition in this patient with metastatic Ca./gurgly voice/change of breathing pattern/cough/fever/throat clearing

## 2018-02-18 NOTE — SWALLOW BEDSIDE ASSESSMENT ADULT - COMMENTS
2/18/18 Cardiology f/u: SOB, likely secondary to pulm disease; echo revealing normal lv sys fx; VQ scan revealing low probability of PE; chest xray revealing sml Left pleural effusion; continue to hold lasix given PIERRE , no evidence of decomp CHF on exam; pulm f/u.  LE edema likely secondary to hypoalbuminemia / extensive mets disease; Ct abd/pelvis revealing bilobar hepatic mets/ sclerotic osseous mets/ sml left pleural effusion; lasix on hold given PIERRE.

## 2018-02-18 NOTE — PHYSICAL THERAPY INITIAL EVALUATION ADULT - ADDITIONAL COMMENTS
History provided by . Pt lives in private home with spouse, 3 CONCHIS +2HRs. Pt has an additional flight of steps to bedroom, however a chair lift was just installed. Per spouse, pt was independent with all functional until about 2 weeks ago when she began to decline.

## 2018-02-18 NOTE — PROGRESS NOTE ADULT - PROBLEM SELECTOR PLAN 1
Reviewed records from office -- pt has had liver disease since 2012; LFTs have been abnormal for the last 1 year however kidney function has been stable up until 2 weeks ago.     Expl to  that last scan in November revealed stable to improved liver lesions but worsening of bone lesions.  Her markers were stable up untill January 2018 when they have been increasing. She was due for a PET scan this past week but was hospitalized due to weakness.  Unclear if marker increase due to bony progression vs progression in liver.    Also explained to  that the edema may be due to hypoalbuminemia and albumin may help with relieving fluid in tissues.  Balance is to add diuretics in setting of PIERRE.

## 2018-02-18 NOTE — SWALLOW BEDSIDE ASSESSMENT ADULT - SWALLOW EVAL: RECOMMENDED DIET
Dysphagia 1 diet with nectar thickened liquids as tolerated; feed only when totally awake/alert; d/c oral diet if Pt's respiratory status declines or if Pt exhibits s/s of aspiration.

## 2018-02-18 NOTE — PROGRESS NOTE ADULT - SUBJECTIVE AND OBJECTIVE BOX
CARDIOLOGY FOLLOW UP NOTE - DR. PALACIO    Subjective:    no inc sob  no cp  no inc abd raz    PHYSICAL EXAM:  T(C): 36.4 (02-18-18 @ 09:10), Max: 36.9 (02-17-18 @ 14:23)  HR: 77 (02-18-18 @ 09:10) (77 - 106)  BP: 116/73 (02-18-18 @ 09:10) (101/68 - 118/76)  RR: 18 (02-18-18 @ 09:10) (18 - 18)  SpO2: 98% (02-18-18 @ 09:10) (95% - 100%)  Wt(kg): --  I&O's Summary    17 Feb 2018 07:01  -  18 Feb 2018 07:00  --------------------------------------------------------  IN: 1180 mL / OUT: 500 mL / NET: 680 mL        Appearance: Normal	  Cardiovascular: Normal S1 S2,RRR, No JVD, No murmurs  Respiratory: Lungs clear to auscultation	dec bs bases  Gastrointestinal:  Soft, Non-tender, distended  Extremities: Normal range of motion,edema    MEDICATIONS  (STANDING):  ALBUTerol/ipratropium for Nebulization 3 milliLiter(s) Nebulizer every 6 hours  buDESOnide   0.5 milliGRAM(s) Respule 0.5 milliGRAM(s) Inhalation every 12 hours  dextrose 5%. 1000 milliLiter(s) (50 mL/Hr) IV Continuous <Continuous>  dextrose 50% Injectable 12.5 Gram(s) IV Push once  dextrose 50% Injectable 25 Gram(s) IV Push once  dextrose 50% Injectable 25 Gram(s) IV Push once  escitalopram 20 milliGRAM(s) Oral daily  insulin glargine Injectable (LANTUS) 32 Unit(s) SubCutaneous at bedtime  insulin lispro (HumaLOG) corrective regimen sliding scale   SubCutaneous three times a day before meals  insulin lispro (HumaLOG) corrective regimen sliding scale   SubCutaneous at bedtime  nystatin Powder 1 Application(s) Topical two times a day  pantoprazole    Tablet 40 milliGRAM(s) Oral before breakfast  predniSONE   Tablet 5 milliGRAM(s) Oral daily  propranolol 10 milliGRAM(s) Oral daily      TELEMETRY: 	    ECG:  	  RADIOLOGY:   DIAGNOSTIC TESTING:  [ ] Echocardiogram:  [ ] Catheterization:  [ ] Stress Test:    OTHER: 	    LABS:	 	    CARDIAC MARKERS:                                8.8    7.4   )-----------( 49       ( 18 Feb 2018 06:40 )             25.2     02-18    137  |  95<L>  |  78<H>  ----------------------------<  102<H>  3.7   |  28  |  1.94<H>    Ca    9.0      18 Feb 2018 06:40      proBNP:     Lipid Profile:   HgA1c:

## 2018-02-18 NOTE — SWALLOW BEDSIDE ASSESSMENT ADULT - ORAL PREPARATORY PHASE
Reduced oral grading/Within functional limits Reduced oral grading/difficulty drawing bolus up via straw with underlying breathing pattern considered to be a contributing factor; adequate with cup drinking

## 2018-02-18 NOTE — PHYSICAL THERAPY INITIAL EVALUATION ADULT - CRITERIA FOR SKILLED THERAPEUTIC INTERVENTIONS
impairments found impairments found/rehab potential/functional limitations in following categories/risk reduction/prevention

## 2018-02-18 NOTE — PHYSICAL THERAPY INITIAL EVALUATION ADULT - PERTINENT HX OF CURRENT PROBLEM, REHAB EVAL
73yo F with  with breast cancer with mets to lung, liver, bones, p/w SOB x several weeks, getting worse, today generalized weakness, increasing confusion. CT Abd 2/13: Bilobar hepatic metastases. Sclerotic osseous metastases.Small left pleural effusion. Left basilar consolidation may represent atelectasis or pneumonia.

## 2018-02-18 NOTE — SWALLOW BEDSIDE ASSESSMENT ADULT - SWALLOW EVAL: RECOMMENDED FEEDING/EATING TECHNIQUES
small sips/bites/position upright (90 degrees)/crush medication (when feasible)/maintain upright posture during/after eating for 30 mins

## 2018-02-18 NOTE — PROGRESS NOTE ADULT - ASSESSMENT
72 year old female with breast cancer with mets to lung, liver, bones, s/p mastectomy, s/p depression, HTN, HLD, T2DM admitted with LE edema/ SOB     1. cv stable   no chest pain or sob.  echo revealing normal lv sys fx     2. SOB, likely secondary to pulm disease   echo revealing normal lv sys fx   VQ scan revealing low probability of PE   chest xray revealing sml Left pleural effusion   continue to hold lasix given PIERRE , no evidence of decomp CHF on exam   pulm f/u     3. LE edema   likely secondary to hypoalbuminemia / extensive mets disease   Ct abd/pelvis revealing bilobar hepatic mets/ sclerotic osseous mets/ sml left pleural effusion   lasix on hold given PIERRE   med/ onc f/u   albumin prn      4 htn   bp stable  cont propranolol     5. elevated LFTS  gi f/u   s/p paracentesis  with replacement of albumin    6. PIERRE   lasix on hold   renal fxn stable    dvt ppx

## 2018-02-18 NOTE — PROGRESS NOTE ADULT - ASSESSMENT
· Assessment		  71 yo female with PMH of breast cancer with mets to lung, liver, bones, s/p mastectomy, s/p depression, HTN, HLD, T2DM, presents with SOB and LE swelling.      ·  Problem: Dyspnea, unspecified type.  Plan: Patient with dyspnea and LE edema  PE ruled out  leukocytosis likely due to steroid use   hold diuretic 2 to PIERRE.  monitor ins/outs and daily weight  cardiology follow Dr. Amaral       ·  Problem: CHF exacerbation.   monitor ins/outs and daily weight   cardiology follow Dr. Amaral  pulmonary follow Dr. Babin      ·  Problem: PIERRE (acute kidney injury).  Plan: baseline creatinine likely 0.8  unclear etiology of PIERRE, ? metastases; CT scan shows hyperdense lesions  will monitor BMP  Nephrology evaluation Dr. Taylor noted.  supplement albumin      ·  Problem: Elevated LFTs.  Plan: likely due to metastasis  no biliary obstruction seen on CT scan  monitor LFTs for now    Problem: Type 2 diabetes mellitus. Plan: check A1c  sliding scale  on tujeo 40u at bedtime; will continue lantus 32u.    ·  Problem: Depression.  Plan: escitalopram.     ·  Problem: Thrombocytopenia.  Plan: platelet was 89 on 2/1/18  ?chemo induced  will monitor for now.     Speech difficulty- discussed with Dr. Whitney. Patient had recent MRI brain normal. Neurology evaluation noted.      ·  Problem: Prophylactic measure.  Plan: no AC at this time, due to rapidly worsening thrombocytopenia  monitor platelet for now.    Metastatic breast cancer- Oncology evaluation Dr. Christensen group noted     Order for DNR/DNI placed.  Constipation- bowel regimen  Urinary retention- Peters  PT  DCP in progress.  d/w  QA    Rick Jacobo MD pager [phone]

## 2018-02-18 NOTE — PROGRESS NOTE ADULT - SUBJECTIVE AND OBJECTIVE BOX
Patient is a 72y old  Female who presents with a chief complaint of shortness of breath (13 Feb 2018 21:46)      SUBJECTIVE / OVERNIGHT EVENTS: c/o constipation. weak. Family at bedside.   Review of Systems  chest pain no  palpitations no  sob no  nausea no  headache no    MEDICATIONS  (STANDING):  ALBUTerol/ipratropium for Nebulization 3 milliLiter(s) Nebulizer every 6 hours  buDESOnide   0.5 milliGRAM(s) Respule 0.5 milliGRAM(s) Inhalation every 12 hours  dextrose 5%. 1000 milliLiter(s) (50 mL/Hr) IV Continuous <Continuous>  dextrose 50% Injectable 12.5 Gram(s) IV Push once  dextrose 50% Injectable 25 Gram(s) IV Push once  dextrose 50% Injectable 25 Gram(s) IV Push once  docusate sodium 100 milliGRAM(s) Oral three times a day  escitalopram 20 milliGRAM(s) Oral daily  insulin glargine Injectable (LANTUS) 32 Unit(s) SubCutaneous at bedtime  insulin lispro (HumaLOG) corrective regimen sliding scale   SubCutaneous three times a day before meals  insulin lispro (HumaLOG) corrective regimen sliding scale   SubCutaneous at bedtime  nystatin Powder 1 Application(s) Topical two times a day  pantoprazole    Tablet 40 milliGRAM(s) Oral before breakfast  predniSONE   Tablet 5 milliGRAM(s) Oral daily  propranolol 10 milliGRAM(s) Oral daily    MEDICATIONS  (PRN):  dextrose Gel 1 Dose(s) Oral once PRN Blood Glucose LESS THAN 70 milliGRAM(s)/deciliter  glucagon  Injectable 1 milliGRAM(s) IntraMuscular once PRN Glucose LESS THAN 70 milligrams/deciliter  polyethylene glycol 3350 17 Gram(s) Oral at bedtime PRN Constipation  senna 2 Tablet(s) Oral at bedtime PRN Constipation          PHYSICAL EXAM:  GENERAL: NAD, well-developed  HEAD:  Atraumatic, Normocephalic  EYES: EOMI, PERRLA, conjunctiva and sclera clear  NECK: Supple, No JVD  CHEST/LUNG: Clear to auscultation bilaterally; No wheeze  HEART: Regular rate and rhythm; No murmurs, rubs, or gallops  ABDOMEN: Soft, Nontender, Nondistended; Bowel sounds present  EXTREMITIES:  2+ Peripheral Pulses, No clubbing, cyanosis, 2+edema  PSYCH: AAOx3  NEUROLOGY: non-focal  SKIN: No rashes or lesions    LABS:                        8.8    7.4   )-----------( 49       ( 18 Feb 2018 06:40 )             25.2     02-18    137  |  95<L>  |  78<H>  ----------------------------<  102<H>  3.7   |  28  |  1.94<H>    Ca    9.0      18 Feb 2018 06:40                RADIOLOGY & ADDITIONAL TESTS:    Imaging Personally Reviewed:    Consultant(s) Notes Reviewed:      Care Discussed with Consultants/Other Providers:

## 2018-02-18 NOTE — PROGRESS NOTE ADULT - SUBJECTIVE AND OBJECTIVE BOX
Patient is a 72y old  Female who presents with a chief complaint of shortness of breath (13 Feb 2018 21:46)       Pt is seen and examined  pt is awake and lying in bed  pt seems comfortable and denies any complaints at this time; still fatigued and has not been out of bed; no PT clearance;      REVIEW OF SYSTEMS:    CONSTITUTIONAL: (+) weakness, No fevers or chills  EYES/ENT: No visual changes;  No vertigo or throat pain   NECK: No pain or stiffness  RESPIRATORY: No cough, wheezing, hemoptysis; No shortness of breath  CARDIOVASCULAR: No chest pain or palpitations  GASTROINTESTINAL: No abdominal or epigastric pain. No nausea, vomiting, or hematemesis; No diarrhea or constipation. No melena or hematochezia.  GENITOURINARY: No dysuria, frequency or hematuria  NEUROLOGICAL: No numbness or weakness  SKIN: No itching, burning, rashes, or lesions     MEDICATIONS  (STANDING):  ALBUTerol/ipratropium for Nebulization 3 milliLiter(s) Nebulizer every 6 hours  buDESOnide   0.5 milliGRAM(s) Respule 0.5 milliGRAM(s) Inhalation every 12 hours  dextrose 5%. 1000 milliLiter(s) (50 mL/Hr) IV Continuous <Continuous>  dextrose 50% Injectable 12.5 Gram(s) IV Push once  dextrose 50% Injectable 25 Gram(s) IV Push once  dextrose 50% Injectable 25 Gram(s) IV Push once  escitalopram 20 milliGRAM(s) Oral daily  insulin glargine Injectable (LANTUS) 32 Unit(s) SubCutaneous at bedtime  insulin lispro (HumaLOG) corrective regimen sliding scale   SubCutaneous three times a day before meals  insulin lispro (HumaLOG) corrective regimen sliding scale   SubCutaneous at bedtime  nystatin Powder 1 Application(s) Topical two times a day  pantoprazole    Tablet 40 milliGRAM(s) Oral before breakfast  predniSONE   Tablet 5 milliGRAM(s) Oral daily  propranolol 10 milliGRAM(s) Oral daily    MEDICATIONS  (PRN):  dextrose Gel 1 Dose(s) Oral once PRN Blood Glucose LESS THAN 70 milliGRAM(s)/deciliter  glucagon  Injectable 1 milliGRAM(s) IntraMuscular once PRN Glucose LESS THAN 70 milligrams/deciliter      Allergies    Levaquin (Short breath)  penicillin (Swelling)    Intolerances        Vital Signs Last 24 Hrs  T(C): 36.3 (18 Feb 2018 05:27), Max: 36.9 (17 Feb 2018 14:23)  T(F): 97.3 (18 Feb 2018 05:27), Max: 98.5 (17 Feb 2018 14:23)  HR: 99 (18 Feb 2018 05:27) (80 - 106)  BP: 104/64 (18 Feb 2018 05:27) (101/68 - 118/76)  BP(mean): 96 (17 Feb 2018 20:03) (96 - 96)  RR: 18 (18 Feb 2018 05:27) (18 - 18)  SpO2: 97% (18 Feb 2018 05:27) (95% - 100%)    PHYSICAL EXAM  General: adult in NAD  HEENT: clear oropharynx, anicteric sclera, pink conjunctiva  Neck: supple  CV: normal S1/S2 with no murmur rubs or gallops  Lungs: positive air movement b/l ant lungs,clear to auscultation, no wheezes, no rales  Abdomen: soft non-tender non-distended, no hepatosplenomegaly; (+) abd wall edema  Ext: no clubbing cyanosis or edema  Skin: no rashes and no petechiae  Neuro: alert and oriented X 4, no focal deficits  LABS:                          8.8    7.4   )-----------( 49       ( 18 Feb 2018 06:40 )             25.2         Mean Cell Volume : 110.0 fl  Mean Cell Hemoglobin : 38.4 pg  Mean Cell Hemoglobin Concentration : 35.0 gm/dL    Serial CBC's  02-18 @ 06:40  Hct-25.2 / Hgb-8.8 / Plat-49 / RBC-2.30 / WBC-7.4            02-18    137  |  95<L>  |  78<H>  ----------------------------<  102<H>  3.7   |  28  |  1.94<H>    Ca    9.0      18 Feb 2018 06:40                I&O's Detail    17 Feb 2018 07:01  -  18 Feb 2018 07:00  --------------------------------------------------------  IN:    IV PiggyBack: 500 mL    Oral Fluid: 680 mL  Total IN: 1180 mL    OUT:    Voided: 500 mL  Total OUT: 500 mL    Total NET: 680 mL          Assessment

## 2018-02-18 NOTE — PHYSICAL THERAPY INITIAL EVALUATION ADULT - GAIT DEVIATIONS NOTED, PT EVAL
decreased velocity of limb motion/decreased stride length/decreased weight-shifting ability/decreased step length/decreased risa

## 2018-02-18 NOTE — SWALLOW BEDSIDE ASSESSMENT ADULT - SLP PERTINENT HISTORY OF CURRENT PROBLEM
71 yo female with PMH of breast cancer with mets to lung, liver, bones, s/p mastectomy, s/p depression, HTN, HLD, T2DM, presents with SOB and LE swelling.  Per MD note 2/17/18 Patient with dyspnea and LE edema; PE ruled out; leukocytosis likely due to steroid use; s/p IV vancomycin and IV cefepime; we will hold off on further abx at this time, unless patient develops fever or other symptoms suggestive of pneumonia.  CHF exacerbation.  Plan: s/p lasix. PIERRE (acute kidney injury); baseline creatinine likely 0.8; unclear etiology of PIERRE, ? metastases; CT scan shows hyperdense lesions; will monitor BMP

## 2018-02-19 LAB
ANION GAP SERPL CALC-SCNC: 17 MMOL/L — SIGNIFICANT CHANGE UP (ref 5–17)
BASE EXCESS BLDA CALC-SCNC: 4 MMOL/L — HIGH (ref -2–2)
BUN SERPL-MCNC: 78 MG/DL — HIGH (ref 7–23)
CALCIUM SERPL-MCNC: 9.3 MG/DL — SIGNIFICANT CHANGE UP (ref 8.4–10.5)
CHLORIDE SERPL-SCNC: 95 MMOL/L — LOW (ref 96–108)
CO2 BLDA-SCNC: 28 MMOL/L — SIGNIFICANT CHANGE UP (ref 22–30)
CO2 SERPL-SCNC: 26 MMOL/L — SIGNIFICANT CHANGE UP (ref 22–31)
CREAT SERPL-MCNC: 1.82 MG/DL — HIGH (ref 0.5–1.3)
GLUCOSE BLDC GLUCOMTR-MCNC: 109 MG/DL — HIGH (ref 70–99)
GLUCOSE BLDC GLUCOMTR-MCNC: 110 MG/DL — HIGH (ref 70–99)
GLUCOSE BLDC GLUCOMTR-MCNC: 120 MG/DL — HIGH (ref 70–99)
GLUCOSE BLDC GLUCOMTR-MCNC: 135 MG/DL — HIGH (ref 70–99)
GLUCOSE SERPL-MCNC: 111 MG/DL — HIGH (ref 70–99)
HCO3 BLDA-SCNC: 27 MMOL/L — SIGNIFICANT CHANGE UP (ref 21–29)
HCT VFR BLD CALC: 26 % — LOW (ref 34.5–45)
HGB BLD-MCNC: 9 G/DL — LOW (ref 11.5–15.5)
MCHC RBC-ENTMCNC: 34.5 GM/DL — SIGNIFICANT CHANGE UP (ref 32–36)
MCHC RBC-ENTMCNC: 38.4 PG — HIGH (ref 27–34)
MCV RBC AUTO: 111 FL — HIGH (ref 80–100)
PCO2 BLDA: 34 MMHG — SIGNIFICANT CHANGE UP (ref 32–46)
PH BLDA: 7.5 — HIGH (ref 7.35–7.45)
PLATELET # BLD AUTO: 51 K/UL — LOW (ref 150–400)
PO2 BLDA: 73 MMHG — LOW (ref 74–108)
POTASSIUM SERPL-MCNC: 3.8 MMOL/L — SIGNIFICANT CHANGE UP (ref 3.5–5.3)
POTASSIUM SERPL-SCNC: 3.8 MMOL/L — SIGNIFICANT CHANGE UP (ref 3.5–5.3)
PROCALCITONIN SERPL-MCNC: 1.41 NG/ML — HIGH (ref 0–0.04)
RBC # BLD: 2.34 M/UL — LOW (ref 3.8–5.2)
RBC # FLD: 22.3 % — HIGH (ref 10.3–14.5)
SAO2 % BLDA: 95 % — SIGNIFICANT CHANGE UP (ref 92–96)
SODIUM SERPL-SCNC: 138 MMOL/L — SIGNIFICANT CHANGE UP (ref 135–145)
WBC # BLD: 8.4 K/UL — SIGNIFICANT CHANGE UP (ref 3.8–10.5)
WBC # FLD AUTO: 8.4 K/UL — SIGNIFICANT CHANGE UP (ref 3.8–10.5)

## 2018-02-19 PROCEDURE — 71250 CT THORAX DX C-: CPT | Mod: 26

## 2018-02-19 RX ORDER — INSULIN GLARGINE 100 [IU]/ML
15 INJECTION, SOLUTION SUBCUTANEOUS ONCE
Qty: 0 | Refills: 0 | Status: COMPLETED | OUTPATIENT
Start: 2018-02-19 | End: 2018-02-20

## 2018-02-19 RX ORDER — ALBUMIN HUMAN 25 %
250 VIAL (ML) INTRAVENOUS EVERY 6 HOURS
Qty: 0 | Refills: 0 | Status: COMPLETED | OUTPATIENT
Start: 2018-02-19 | End: 2018-02-21

## 2018-02-19 RX ORDER — FUROSEMIDE 40 MG
60 TABLET ORAL EVERY 12 HOURS
Qty: 0 | Refills: 0 | Status: DISCONTINUED | OUTPATIENT
Start: 2018-02-19 | End: 2018-02-24

## 2018-02-19 RX ORDER — INSULIN LISPRO 100/ML
VIAL (ML) SUBCUTANEOUS EVERY 6 HOURS
Qty: 0 | Refills: 0 | Status: DISCONTINUED | OUTPATIENT
Start: 2018-02-19 | End: 2018-02-25

## 2018-02-19 RX ORDER — CEFEPIME 1 G/1
1000 INJECTION, POWDER, FOR SOLUTION INTRAMUSCULAR; INTRAVENOUS EVERY 12 HOURS
Qty: 0 | Refills: 0 | Status: DISCONTINUED | OUTPATIENT
Start: 2018-02-19 | End: 2018-02-20

## 2018-02-19 RX ADMIN — Medication 50 MILLIGRAM(S): at 14:44

## 2018-02-19 RX ADMIN — ESCITALOPRAM OXALATE 20 MILLIGRAM(S): 10 TABLET, FILM COATED ORAL at 14:44

## 2018-02-19 RX ADMIN — Medication 0.5 MILLIGRAM(S): at 17:30

## 2018-02-19 RX ADMIN — CEFEPIME 100 MILLIGRAM(S): 1 INJECTION, POWDER, FOR SOLUTION INTRAMUSCULAR; INTRAVENOUS at 17:35

## 2018-02-19 RX ADMIN — Medication 3 MILLILITER(S): at 05:31

## 2018-02-19 RX ADMIN — Medication 60 MILLIGRAM(S): at 17:35

## 2018-02-19 RX ADMIN — Medication 3 MILLILITER(S): at 14:00

## 2018-02-19 RX ADMIN — Medication 125 MILLILITER(S): at 18:28

## 2018-02-19 RX ADMIN — NYSTATIN CREAM 1 APPLICATION(S): 100000 CREAM TOPICAL at 17:29

## 2018-02-19 RX ADMIN — NYSTATIN CREAM 1 APPLICATION(S): 100000 CREAM TOPICAL at 05:31

## 2018-02-19 RX ADMIN — PANTOPRAZOLE SODIUM 40 MILLIGRAM(S): 20 TABLET, DELAYED RELEASE ORAL at 05:30

## 2018-02-19 RX ADMIN — Medication 10 MILLIGRAM(S): at 05:31

## 2018-02-19 RX ADMIN — Medication 0.5 MILLIGRAM(S): at 05:31

## 2018-02-19 RX ADMIN — Medication 5 MILLIGRAM(S): at 05:30

## 2018-02-19 NOTE — PROGRESS NOTE ADULT - ASSESSMENT
73 yo female with PMH of breast cancer with mets to lung, liver, bones, s/p mastectomy, s/p depression, HTN, HLD, T2DM, presents with SOB and LE swelling found to have PIERRE. Renal following for PIERRE    labs reviewed

## 2018-02-19 NOTE — PROGRESS NOTE ADULT - PROBLEM SELECTOR PLAN 1
Pre REnal vs ATN ( gemcitabine /TMA) vs HRS. no hydro on renal sono-reviewed. inc echogenicity, likely underlying CKD  U lytes noted- intravascularly depleted.  CT chest with b/l pleural effusions  Cr stable but higher then baseline  no indication for RRT/dialysis at this time  will star albumin again q6h x 48 hours, but given b/l mod plerual effusions will also give lasix 60mg iv bid and monitor   keep off nephrotoxins  check urine lytes daily ( urine na/cr/cl/osm/k)  dose all meds for eGFR <15ml/min  avoid ACEi/ARB/NSAIDs/nephrotoxics

## 2018-02-19 NOTE — PROGRESS NOTE ADULT - ASSESSMENT
· Assessment		  71 yo female with PMH of breast cancer with mets to lung, liver, bones, s/p mastectomy, s/p depression, HTN, HLD, T2DM, presents with SOB and LE swelling.  Pneumonia/ Mucus plug- start empiric antibiotics.  Pulmonary follow.  ID evaluation called Dr. Child.  cardiology follow Dr. Amaral       ·  Problem: CHF exacerbation.   monitor ins/outs and daily weight   cardiology follow Dr. Amaral        ·  Problem: PIERRE (acute kidney injury).  Plan: baseline creatinine likely 0.8  unclear etiology of PIERRE, ? metastases; CT scan shows hyperdense lesions  will monitor BMP  Nephrology follow Dr. Taylor noted.  supplement albumin/ Lasix      ·  Problem: Elevated LFTs.  Plan: likely due to metastasis  no biliary obstruction seen on CT scan  monitor LFTs in am    Problem: Type 2 diabetes mellitus.  sliding scale      ·  Problem: Depression.  Plan: escitalopram.     ·  Problem: Thrombocytopenia.  Plan: platelet was 89 on 2/1/18  ?chemo induced  will monitor for now.     Speech difficulty- discussed with Dr. Whitney. Patient had recent MRI brain normal. Neurology follow noted.      ·  Problem: Prophylactic measure.  Plan: no AC at this time, due to rapidly worsening thrombocytopenia  monitor platelet for now.    Metastatic breast cancer- Oncology follow. Dr. Christensen group noted     Order for DNR/DNI placed.  Constipation- bowel regimen  Urinary retention- Peters  PT  d/w /daughter QA    Rick Jacobo MD pager [phone]

## 2018-02-19 NOTE — PROGRESS NOTE ADULT - SUBJECTIVE AND OBJECTIVE BOX
Patient is a 72y old  Female who presents with a chief complaint of shortness of breath (13 Feb 2018 21:46)      SUBJECTIVE / OVERNIGHT EVENTS: lethargic, arrousable. Family at bedside.   Review of Systems  chest pain no  palpitations no  sob no  nausea no  headache no    MEDICATIONS  (STANDING):  albumin human  5% IVPB 250 milliLiter(s) IV Intermittent every 6 hours  ALBUTerol/ipratropium for Nebulization 3 milliLiter(s) Nebulizer every 6 hours  buDESOnide   0.5 milliGRAM(s) Respule 0.5 milliGRAM(s) Inhalation every 12 hours  cefepime  IVPB 1000 milliGRAM(s) IV Intermittent every 12 hours  dextrose 5%. 1000 milliLiter(s) (50 mL/Hr) IV Continuous <Continuous>  dextrose 50% Injectable 12.5 Gram(s) IV Push once  dextrose 50% Injectable 25 Gram(s) IV Push once  dextrose 50% Injectable 25 Gram(s) IV Push once  docusate sodium 100 milliGRAM(s) Oral three times a day  escitalopram 20 milliGRAM(s) Oral daily  furosemide   Injectable 60 milliGRAM(s) IV Push every 12 hours  insulin glargine Injectable (LANTUS) 32 Unit(s) SubCutaneous at bedtime  insulin lispro (HumaLOG) corrective regimen sliding scale   SubCutaneous three times a day before meals  insulin lispro (HumaLOG) corrective regimen sliding scale   SubCutaneous at bedtime  nystatin Powder 1 Application(s) Topical two times a day  pantoprazole    Tablet 40 milliGRAM(s) Oral before breakfast  predniSONE   Tablet 50 milliGRAM(s) Oral daily  propranolol 10 milliGRAM(s) Oral daily    MEDICATIONS  (PRN):  dextrose Gel 1 Dose(s) Oral once PRN Blood Glucose LESS THAN 70 milliGRAM(s)/deciliter  glucagon  Injectable 1 milliGRAM(s) IntraMuscular once PRN Glucose LESS THAN 70 milligrams/deciliter  polyethylene glycol 3350 17 Gram(s) Oral at bedtime PRN Constipation  senna 2 Tablet(s) Oral at bedtime PRN Constipation          PHYSICAL EXAM:  GENERAL: NAD  HEAD:  Atraumatic, Normocephalic  EYES: EOMI, PERRLA, conjunctiva and sclera jaundiced  NECK: Supple, No JVD  CHEST/LUNG: Clear to auscultation bilaterally; No wheeze  HEART: Regular rate and rhythm; No murmurs, rubs, or gallops  ABDOMEN: Soft, Nontender, Nondistended; Bowel sounds present  EXTREMITIES:  2+ Peripheral Pulses, No clubbing, cyanosis, 2+edema  NEUROLOGY: non-focal BUT LETHARGIC  SKIN: No rashes or lesions    LABS:                        9.0    8.4   )-----------( 51       ( 19 Feb 2018 06:37 )             26.0     02-19    138  |  95<L>  |  78<H>  ----------------------------<  111<H>  3.8   |  26  |  1.82<H>    Ca    9.3      19 Feb 2018 06:38                RADIOLOGY & ADDITIONAL TESTS:    Imaging Personally Reviewed:  < from: CT Chest No Cont (02.19.18 @ 11:27) >    IMPRESSION:    Moderate bilateral pleural effusions with bilateral lower and upper lobe   atelectasis. Left lower lobe atelectasis may be secondary to a left lower   lobe bronchus mucous plug.    Patchy groundglass opacities in the right upper lobe and left lower lobe   may be secondary to infection.    Hepatic and blastic osseous metastases.      < end of copied text >    Consultant(s) Notes Reviewed:      Care Discussed with Consultants/Other Providers:

## 2018-02-19 NOTE — PROGRESS NOTE ADULT - PROBLEM SELECTOR PLAN 1
--- with liver involvement ---   --- s/p gemzar therapy 2 wks ago    Discussed pt's condition at length with  -- unclear if pt's condition is all chemo toxicity vs progression of disease -- will d/w dr Christensen

## 2018-02-19 NOTE — PROGRESS NOTE ADULT - ASSESSMENT
72 year old female with breast cancer with mets to lung, liver, bones, s/p mastectomy, s/p depression, HTN, HLD, T2DM admitted with LE edema/ SOB     1. cv stable     2. SOB, likely secondary to pulm disease, met lung disease   echo revealing normal lv sys fx   VQ scan revealing low probability of PE   chest xray revealing sml Left pleural effusion   continue to hold lasix given PIERRE , no evidence of decomp CHF on exam   pulm f/u     3. LE edema   likely secondary to hypoalbuminemia / extensive mets disease   Ct abd/pelvis revealing bilobar hepatic mets/ sclerotic osseous mets/ sml left pleural effusion   lasix on hold given PIERRE   med/ onc f/u   albumin prn      4 htn   bp stable  cont propranolol     5. elevated LFTS  gi f/u   s/p paracentesis  with replacement of albumin    6. PIERRE   lasix on hold   renal fxn stable    dvt ppx

## 2018-02-19 NOTE — PROGRESS NOTE ADULT - SUBJECTIVE AND OBJECTIVE BOX
CARDIOLOGY FOLLOW UP NOTE - DR. PALACIO    Subjective:    no new complaints    PHYSICAL EXAM:  T(C): 36.7 (02-19-18 @ 08:48), Max: 36.7 (02-18-18 @ 13:16)  HR: 85 (02-19-18 @ 08:48) (80 - 86)  BP: 107/61 (02-19-18 @ 08:48) (107/61 - 135/79)  RR: 16 (02-19-18 @ 08:48) (16 - 18)  SpO2: 95% (02-19-18 @ 08:48) (93% - 97%)  Wt(kg): --  I&O's Summary    18 Feb 2018 07:01  -  19 Feb 2018 07:00  --------------------------------------------------------  IN: 830 mL / OUT: 100 mL / NET: 730 mL    19 Feb 2018 07:01  -  19 Feb 2018 10:47  --------------------------------------------------------  IN: 25 mL / OUT: 0 mL / NET: 25 mL        Appearance: Normal	  Cardiovascular: Normal S1 S2,RRR, No JVD, No murmurs  Respiratory: Lungs clear to auscultation	  Gastrointestinal:  Soft, Non-tender, + BS	  Extremities: Normal range of motion, No clubbing, cyanosis or edema      MEDICATIONS  (STANDING):  ALBUTerol/ipratropium for Nebulization 3 milliLiter(s) Nebulizer every 6 hours  buDESOnide   0.5 milliGRAM(s) Respule 0.5 milliGRAM(s) Inhalation every 12 hours  dextrose 5%. 1000 milliLiter(s) (50 mL/Hr) IV Continuous <Continuous>  dextrose 50% Injectable 12.5 Gram(s) IV Push once  dextrose 50% Injectable 25 Gram(s) IV Push once  dextrose 50% Injectable 25 Gram(s) IV Push once  docusate sodium 100 milliGRAM(s) Oral three times a day  escitalopram 20 milliGRAM(s) Oral daily  insulin glargine Injectable (LANTUS) 32 Unit(s) SubCutaneous at bedtime  insulin lispro (HumaLOG) corrective regimen sliding scale   SubCutaneous three times a day before meals  insulin lispro (HumaLOG) corrective regimen sliding scale   SubCutaneous at bedtime  nystatin Powder 1 Application(s) Topical two times a day  pantoprazole    Tablet 40 milliGRAM(s) Oral before breakfast  predniSONE   Tablet 50 milliGRAM(s) Oral daily  propranolol 10 milliGRAM(s) Oral daily      TELEMETRY: 	    ECG:  	  RADIOLOGY:   DIAGNOSTIC TESTING:  [ ] Echocardiogram:  [ ] Catheterization:  [ ] Stress Test:    OTHER: 	    LABS:	 	    CARDIAC MARKERS:                                9.0    8.4   )-----------( 51       ( 19 Feb 2018 06:37 )             26.0     02-19    138  |  95<L>  |  78<H>  ----------------------------<  111<H>  3.8   |  26  |  1.82<H>    Ca    9.3      19 Feb 2018 06:38      proBNP:     Lipid Profile:   HgA1c:

## 2018-02-19 NOTE — PROGRESS NOTE ADULT - SUBJECTIVE AND OBJECTIVE BOX
SUBJECTIVE: has been sleepy past two days    Medications:  MEDICATIONS  (STANDING):  ALBUTerol/ipratropium for Nebulization 3 milliLiter(s) Nebulizer every 6 hours  buDESOnide   0.5 milliGRAM(s) Respule 0.5 milliGRAM(s) Inhalation every 12 hours  dextrose 5%. 1000 milliLiter(s) (50 mL/Hr) IV Continuous <Continuous>  dextrose 50% Injectable 12.5 Gram(s) IV Push once  dextrose 50% Injectable 25 Gram(s) IV Push once  dextrose 50% Injectable 25 Gram(s) IV Push once  docusate sodium 100 milliGRAM(s) Oral three times a day  escitalopram 20 milliGRAM(s) Oral daily  insulin glargine Injectable (LANTUS) 32 Unit(s) SubCutaneous at bedtime  insulin lispro (HumaLOG) corrective regimen sliding scale   SubCutaneous three times a day before meals  insulin lispro (HumaLOG) corrective regimen sliding scale   SubCutaneous at bedtime  nystatin Powder 1 Application(s) Topical two times a day  pantoprazole    Tablet 40 milliGRAM(s) Oral before breakfast  predniSONE   Tablet 5 milliGRAM(s) Oral daily  propranolol 10 milliGRAM(s) Oral daily    MEDICATIONS  (PRN):  dextrose Gel 1 Dose(s) Oral once PRN Blood Glucose LESS THAN 70 milliGRAM(s)/deciliter  glucagon  Injectable 1 milliGRAM(s) IntraMuscular once PRN Glucose LESS THAN 70 milligrams/deciliter  polyethylene glycol 3350 17 Gram(s) Oral at bedtime PRN Constipation  senna 2 Tablet(s) Oral at bedtime PRN Constipation      Labs:  CBC Full  -  ( 19 Feb 2018 06:37 )  WBC Count : 8.4 K/uL  Hemoglobin : 9.0 g/dL  Hematocrit : 26.0 %  Platelet Count - Automated : 51 K/uL  Mean Cell Volume : 111.0 fl  Mean Cell Hemoglobin : 38.4 pg  Mean Cell Hemoglobin Concentration : 34.5 gm/dL  Auto Neutrophil # : x  Auto Lymphocyte # : x  Auto Monocyte # : x  Auto Eosinophil # : x  Auto Basophil # : x  Auto Neutrophil % : x  Auto Lymphocyte % : x  Auto Monocyte % : x  Auto Eosinophil % : x  Auto Basophil % : x    02-19    138  |  95<L>  |  78<H>  ----------------------------<  111<H>  3.8   |  26  |  1.82<H>    Ca    9.3      19 Feb 2018 06:38      CAPILLARY BLOOD GLUCOSE      POCT Blood Glucose.: 110 mg/dL (19 Feb 2018 07:33)  POCT Blood Glucose.: 142 mg/dL (18 Feb 2018 22:53)  POCT Blood Glucose.: 112 mg/dL (18 Feb 2018 21:05)  POCT Blood Glucose.: 111 mg/dL (18 Feb 2018 18:17)  POCT Blood Glucose.: 111 mg/dL (18 Feb 2018 11:39)            Vitals:  Vital Signs Last 24 Hrs  T(C): 36.7 (19 Feb 2018 08:48), Max: 36.7 (18 Feb 2018 13:16)  T(F): 98.1 (19 Feb 2018 08:48), Max: 98.1 (19 Feb 2018 08:48)  HR: 85 (19 Feb 2018 08:48) (77 - 86)  BP: 107/61 (19 Feb 2018 08:48) (107/61 - 135/79)  BP(mean): --  RR: 16 (19 Feb 2018 08:48) (16 - 18)  SpO2: 95% (19 Feb 2018 08:48) (93% - 98%)        NEUROLOGICAL EXAM:    Mental status: Awake, alert, and in no apparent distress. Oriented to person, place and time. Language function is normal. Recent memory, digit span and concentration were normal.     Cranial Nerves: Pupils were equal, round, reactive to light. Extraocular movements were intact. Visual field were full. Fundoscopic exam was deferred. Facial sensation was intact to light touch. There was no facial asymmetry. The palate was upgoing symmetrically and tongue was midline. Hearing acuity was intact to finger rub AU. Shoulder shrug was full bilaterally    Motor exam: Bulk and tone were normal. Strength was 5/5 in all four extremities. Fine finger movements were symmetric and normal. There was no pronator drift    Reflexes: 2+ in the bilateral upper extremities. 2+ in the bilateral lower extremities. Toes were downgoing bilaterally.     Sensation: Intact to light touch, temperature, vibration    Coordination: Finger-nose-finger and heel-to-shin was without dysmetria.     Gait: defered

## 2018-02-19 NOTE — PROGRESS NOTE ADULT - SUBJECTIVE AND OBJECTIVE BOX
Patient seen and examined  has some shortness of breath  denies any chest pain    Levaquin (Short breath)  penicillin (Swelling)    Hospital Medications:   MEDICATIONS  (STANDING):  ALBUTerol/ipratropium for Nebulization 3 milliLiter(s) Nebulizer every 6 hours  buDESOnide   0.5 milliGRAM(s) Respule 0.5 milliGRAM(s) Inhalation every 12 hours  dextrose 5%. 1000 milliLiter(s) (50 mL/Hr) IV Continuous <Continuous>  dextrose 50% Injectable 12.5 Gram(s) IV Push once  dextrose 50% Injectable 25 Gram(s) IV Push once  dextrose 50% Injectable 25 Gram(s) IV Push once  docusate sodium 100 milliGRAM(s) Oral three times a day  escitalopram 20 milliGRAM(s) Oral daily  insulin glargine Injectable (LANTUS) 32 Unit(s) SubCutaneous at bedtime  insulin lispro (HumaLOG) corrective regimen sliding scale   SubCutaneous three times a day before meals  insulin lispro (HumaLOG) corrective regimen sliding scale   SubCutaneous at bedtime  nystatin Powder 1 Application(s) Topical two times a day  pantoprazole    Tablet 40 milliGRAM(s) Oral before breakfast  predniSONE   Tablet 50 milliGRAM(s) Oral daily  propranolol 10 milliGRAM(s) Oral daily      VITALS:  T(F): 98.1 (18 @ 08:48), Max: 98.1 (18 @ 08:48)  HR: 85 (18 @ 08:48)  BP: 107/61 (18 @ 08:48)  RR: 16 (18 @ 08:48)  SpO2: 95% (18 @ 08:48)  Wt(kg): --     @ 07:01  -   @ 07:00  --------------------------------------------------------  IN: 830 mL / OUT: 100 mL / NET: 730 mL     @ 07:01  -   @ 13:08  --------------------------------------------------------  IN: 25 mL / OUT: 0 mL / NET: 25 mL    PHYSICAL EXAM:  Constitutional: NAD  HEENT: anicteric sclera, oropharynx clear, MMM  Neck: No JVD  Respiratory: rt lung mild wheezing, dec bibasilar BS, no crepts appreciated  Cardiovascular: S1, S2, RRR  Gastrointestinal: BS+, soft, NT, obese  Extremities: 3+ pitting edema b/l  Neurological: A/O x 3, no focal deficits  Psychiatric: Normal mood, normal affect  : No CVA tenderness. No carty.   Skin: No rashes    LABS:      138  |  95<L>  |  78<H>  ----------------------------<  111<H>  3.8   |  26  |  1.82<H>    Ca    9.3      2018 06:38      Creatinine Trend: 1.82 <--, 1.94 <--, 2.01 <--, 1.99 <--, 2.45 <--, 1.75 <--, 1.67 <--                        9.0    8.4   )-----------( 51       ( 2018 06:37 )             26.0     Urine Studies:  Urinalysis Basic - ( 15 Feb 2018 16:13 )    Color: Karo / Appearance: Clear / S.016 / pH:   Gluc:  / Ketone: Negative  / Bili: Small / Urobili: 1.0 mg/dL   Blood:  / Protein: Negative mg/dL / Nitrite: Negative   Leuk Esterase: Negative / RBC: 5 /HPF / WBC 2 /HPF   Sq Epi:  / Non Sq Epi: 21 /HPF / Bacteria: Occasional      Sodium, Random Urine: <20 mmol/L ( @ 06:40)  Chloride, Random Urine: <35 mmol/L ( @ 06:40)  Potassium, Random Urine: 44 mmol/L ( @ 06:40)  Creatinine, Random Urine: 127 mg/dL ( @ 06:40)  Sodium, Random Urine: <20 mmol/L ( @ 10:26)  Chloride, Random Urine: <20 mmol/L ( @ 10:26)  Creatinine, Random Urine: 118 mg/dL ( @ 10:26)  Osmolality, Random Urine: 386 mos/kg ( @ 10:26)  Potassium, Random Urine: 49 mmol/L ( @ 10:26)  Potassium, Random Urine: 45 mmol/L ( @ 17:11)  Creatinine, Random Urine: 101 mg/dL (02-15 @ 16:13)  Protein/Creatinine Ratio Calculation: 0.1 Ratio (02-15 @ :)  Chloride, Random Urine: <20 mmol/L (02-15 @ 16:13)  Sodium, Random Urine: <20 mmol/L (02-15 @ 16:13)  Osmolality, Random Urine: 420 mos/kg (02-15 @ 16:13)  Potassium, Random Urine: 36 mmol/L (02-15 @ 16:13)    RADIOLOGY & ADDITIONAL STUDIES:

## 2018-02-19 NOTE — PROGRESS NOTE ADULT - SUBJECTIVE AND OBJECTIVE BOX
Patient is a 72y old  Female who presents with a chief complaint of shortness of breath (13 Feb 2018 21:46)       Pt is seen and examined  pt is lethargic, but easily awaken and answers questions appropriately but soon closes eyes; as per , appetite is poor        REVIEW OF SYSTEMS:    CONSTITUTIONAL: No weakness, fevers or chills  EYES/ENT: No visual changes;  No vertigo or throat pain   NECK: No pain or stiffness  RESPIRATORY: No cough, wheezing, hemoptysis; No shortness of breath  CARDIOVASCULAR: No chest pain or palpitations  GASTROINTESTINAL: No abdominal or epigastric pain. No nausea, vomiting, or hematemesis; No diarrhea or constipation. No melena or hematochezia.  GENITOURINARY: No dysuria, frequency or hematuria  NEUROLOGICAL: No numbness or weakness  SKIN: No itching, burning, rashes, or lesions     MEDICATIONS  (STANDING):  ALBUTerol/ipratropium for Nebulization 3 milliLiter(s) Nebulizer every 6 hours  buDESOnide   0.5 milliGRAM(s) Respule 0.5 milliGRAM(s) Inhalation every 12 hours  dextrose 5%. 1000 milliLiter(s) (50 mL/Hr) IV Continuous <Continuous>  dextrose 50% Injectable 12.5 Gram(s) IV Push once  dextrose 50% Injectable 25 Gram(s) IV Push once  dextrose 50% Injectable 25 Gram(s) IV Push once  docusate sodium 100 milliGRAM(s) Oral three times a day  escitalopram 20 milliGRAM(s) Oral daily  insulin glargine Injectable (LANTUS) 32 Unit(s) SubCutaneous at bedtime  insulin lispro (HumaLOG) corrective regimen sliding scale   SubCutaneous three times a day before meals  insulin lispro (HumaLOG) corrective regimen sliding scale   SubCutaneous at bedtime  nystatin Powder 1 Application(s) Topical two times a day  pantoprazole    Tablet 40 milliGRAM(s) Oral before breakfast  predniSONE   Tablet 50 milliGRAM(s) Oral daily  propranolol 10 milliGRAM(s) Oral daily    MEDICATIONS  (PRN):  dextrose Gel 1 Dose(s) Oral once PRN Blood Glucose LESS THAN 70 milliGRAM(s)/deciliter  glucagon  Injectable 1 milliGRAM(s) IntraMuscular once PRN Glucose LESS THAN 70 milligrams/deciliter  polyethylene glycol 3350 17 Gram(s) Oral at bedtime PRN Constipation  senna 2 Tablet(s) Oral at bedtime PRN Constipation      Allergies    Levaquin (Short breath)  penicillin (Swelling)    Intolerances        Vital Signs Last 24 Hrs  T(C): 36.7 (19 Feb 2018 08:48), Max: 36.7 (18 Feb 2018 13:16)  T(F): 98.1 (19 Feb 2018 08:48), Max: 98.1 (19 Feb 2018 08:48)  HR: 85 (19 Feb 2018 08:48) (80 - 86)  BP: 107/61 (19 Feb 2018 08:48) (107/61 - 135/79)  BP(mean): --  RR: 16 (19 Feb 2018 08:48) (16 - 18)  SpO2: 95% (19 Feb 2018 08:48) (93% - 97%)    PHYSICAL EXAM  General: adult in NAD  HEENT: clear oropharynx, anicteric sclera, pink conjunctiva  Neck: supple  CV: normal S1/S2 with no murmur rubs or gallops  Lungs: positive air movement b/l ant lungs,clear to auscultation, no wheezes, no rales  Abdomen: soft non-tender non-distended, no hepatosplenomegaly  Ext: no clubbing cyanosis or edema  Skin: no rashes and no petechiae  Neuro: alert and oriented X 4, no focal deficits  LABS:                          9.0    8.4   )-----------( 51       ( 19 Feb 2018 06:37 )             26.0         Mean Cell Volume : 111.0 fl  Mean Cell Hemoglobin : 38.4 pg  Mean Cell Hemoglobin Concentration : 34.5 gm/dL      Serial CBC's  02-19 @ 06:37  Hct-26.0 / Hgb-9.0 / Plat-51 / RBC-2.34 / WBC-8.4        Serial CBC's  02-18 @ 10:12  Hct--- / Hgb--- / Plat--- / RBC-2.31 / WBC---      Serial CBC's  02-18 @ 06:40  Hct-25.2 / Hgb-8.8 / Plat-49 / RBC-2.30 / WBC-7.4      Serial CBC's  02-17 @ 08:54  Hct-25.0 / Hgb-8.5 / Plat-49 / RBC-2.37 / WBC-7.89              Assessment

## 2018-02-19 NOTE — PROGRESS NOTE ADULT - ASSESSMENT
a/p - 73 yo F with hx of metatastic breat CA who complained of unsteady gait in setting of LE edema.  Exam nonfocal, she is not confused.  MRI done of brain on 2/12 no stroke or new mass, has old craniotomy.  Her labs initially notable for issues with kidney and liver function.  Overall doubt new neurological issue ongoing    Now with lethargy 2/18-2/19.  seems sob.  no myoclonus, asterixis    - no need for more imaging.  exam nonfocal.  still doubt new neuro event  - cause for lethargy is metabolic.  ? dehydration.  ? r/o hypercarbia  - OOB to chair  PT  DVT prophlyaxis  d/w pt and family

## 2018-02-19 NOTE — PROGRESS NOTE ADULT - ASSESSMENT
ASSESSMENT    metastatic breast cancer to the bones, liver and lung s/p mastectomy, craniotomy and chest irradiation - recent decline over the last month punctuated by profound fatigue associated with confusion, slurred speech and memory loss - lower extremity swelling - difficulty walking due to an unsteady gait and leg swelling -    1) extensive metastatic disease to the liver +/- cirrhosis resulting in decreased blood flow through the liver with ascites and lower extremity swelling - no evidence of hepatic encephalopathy  2) shortness of breath is likely related to deconditioning, restrictive lung disease related to radiation pneumonitis, obesity and ascites - i believe the left lower lobe abnormalities are related to the patient's prior chest irradiation rather than new infection but this required further investigation - there is little evidence of a pulmonary embolism on V/Q scan -   3) anemia and thrombocytopenia are likely due to bone marrow replacement by tumor   4) side effects from chemotherapy  5) hypoalbuminemia  6) PIERRE    PLAN/RECOMMENDATIONS    oxygen supplementation to keep saturation greater than 92%  ABG  increase prednisone to 50mg daily  albuterol/atrovent/pulmicort nebs  chest CT - low threshold to initiating antibiotics given elevated procalcitonin level  GI evaluation noted - diagnostic paracentesis without evidence of infection - await cytology result  hold diuretics in the absence of pulmonary edema/pleural effusions and with PIERRE  thigh high large ARA stockings/bilateral SCDs  would consider holding zocor  glucose control  bowel regimen    Will follow with you. Plan of care discussed with the  at bedside and Dr. Odalis Babin MD, [facility] - [phone]  Pulmonary Medicine

## 2018-02-19 NOTE — PROGRESS NOTE ADULT - SUBJECTIVE AND OBJECTIVE BOX
[facility] - [facility]     PROGRESS NOTE    CHIEF COMPLAINT: dyspnea; hypoxemia; radiation pneumonitis; abnormal CXR; bronchospasm    INTERVAL HISTORY: lethargic but arousable laying in bed; mildly short of breath; no fevers, chills or sweats; no cough or sputum production; mild chest congestion and wheeze; no chest pain/pressure or palpitations    REVIEW OF SYSTEMS:  Constitutional: As per interval history  HEENT: Within normal limits  CV: As per interval history  Resp: As per interval history  GI: Within normal limits   : Within normal limits  Musculoskeletal: Within normal limits  Skin: Within normal limits  Neurological: Within normal limits  Psychiatric: Within normal limits  Endocrine: Within normal limits  Hematologic/Lymphatic: metastatic breast cancer  Allergic/Immunologic: Within normal limits      MEDICATIONS:     Pulmonary "  ALBUTerol/ipratropium for Nebulization 3 milliLiter(s) Nebulizer every 6 hours  buDESOnide   0.5 milliGRAM(s) Respule 0.5 milliGRAM(s) Inhalation every 12 hours      Anti-microbials:      Cardiovascular:  propranolol 10 milliGRAM(s) Oral daily      Other:  dextrose 5%. 1000 milliLiter(s) IV Continuous <Continuous>  dextrose 50% Injectable 12.5 Gram(s) IV Push once  dextrose 50% Injectable 25 Gram(s) IV Push once  dextrose 50% Injectable 25 Gram(s) IV Push once  dextrose Gel 1 Dose(s) Oral once PRN  docusate sodium 100 milliGRAM(s) Oral three times a day  escitalopram 20 milliGRAM(s) Oral daily  glucagon  Injectable 1 milliGRAM(s) IntraMuscular once PRN  insulin glargine Injectable (LANTUS) 32 Unit(s) SubCutaneous at bedtime  insulin lispro (HumaLOG) corrective regimen sliding scale   SubCutaneous three times a day before meals  insulin lispro (HumaLOG) corrective regimen sliding scale   SubCutaneous at bedtime  nystatin Powder 1 Application(s) Topical two times a day  pantoprazole    Tablet 40 milliGRAM(s) Oral before breakfast  polyethylene glycol 3350 17 Gram(s) Oral at bedtime PRN  predniSONE   Tablet 5 milliGRAM(s) Oral daily  senna 2 Tablet(s) Oral at bedtime PRN    OBJECTIVE:    I&O's Detail    2018 07:01  -  2018 07:00  --------------------------------------------------------  IN:    Oral Fluid: 830 mL  Total IN: 830 mL    OUT:    Voided: 100 mL  Total OUT: 100 mL    Total NET: 730 mL      2018 07:01  -  2018 09:30  --------------------------------------------------------  IN:    Oral Fluid: 25 mL  Total IN: 25 mL    OUT:  Total OUT: 0 mL    Total NET: 25 mL    POCT Blood Glucose.: 110 mg/dL (2018 07:33)  POCT Blood Glucose.: 142 mg/dL (2018 22:53)  POCT Blood Glucose.: 112 mg/dL (2018 21:05)  POCT Blood Glucose.: 111 mg/dL (2018 18:17)  POCT Blood Glucose.: 111 mg/dL (2018 11:39)      PHYSICAL EXAM:       ICU Vital Signs Last 24 Hrs  T(C): 36.7 (2018 08:48), Max: 36.7 (2018 13:16)  T(F): 98.1 (2018 08:48), Max: 98.1 (2018 08:48)  HR: 85 (2018 08:48) (80 - 86)  BP: 107/61 (2018 08:48) (107/61 - 135/79)  BP(mean): --  ABP: --  ABP(mean): --  RR: 16 (2018 08:48) (16 - 18)  SpO2: 95% (2018 08:48) (93% - 97%) on 2lpm     General: Awake. Alert. Cooperative. No distress. Appears stated age 	  HEENT:   AT/NC/Anicteric. PERRL/EOMI. Normal oral mucosa,  Neck: Supple. Trachea midline. Thyroid without enlargement/tenderness/nodules. No carotid bruit. No JVD	  Cardiovascular: Regular rate and rhythm. S1 S2 normal. No M/R/G  Respiratory: Respirations unlabored. Bilateral wheeze  Abdomen: Soft. Non-tender. Distended. No organomegaly. No masses. Normal BS. Abdominal wall edema  Extremities: Warm to touch. Decreased lower extremity edema. Bilateral SCDs  Pulses: 2+ peripheral pulses all extremities	  Skin: Normal skin color. No rashes or lesions. No ecchymoses. No cyanosis.  Warm to touch  Lymph Nodes: Cervical, supraclavicular and axillary nodes normal  Neurological: Motor and sensory examination equal and normal. A and O x 3  Psychiatry: Sleepy      LABS:                        9.0    8.4   )-----------( 51       ( 2018 06:37 )             26.0                         8.8    7.4   )-----------( 49       ( 2018 06:40 )             25.2         138  |  95<L>  |  78<H>  ----------------------------<  111<H>  3.8   |  26  |  1.82<H>        137  |  95<L>  |  78<H>  ----------------------------<  102<H>  3.7   |  28  |  1.94<H>    Ca      9.3          Ca      9.0          Phos    5.5           Mg       2.4         TPro  4.5<L>  /  Alb  2.2<L>  /  TBili  4.8<H>  /  DBili  x   /  AST  82<H>  /  ALT  41  /  AlkPhos  221<H>      Procalcitonin, Serum: 1.48 ng/mL ( @ 11:10)    < from: Transthoracic Echocardiogram (18 @ 08:59) >    Patient name: STELLA SIMMONS  YOB: 1945   Age: 72 (F)   MR#: [id number]  Study Date: 2018  Location: [address] [place], [place] [id number]: Isabel Patel ASIF  Study quality: Technically difficult  Referring Physician: Rick Jacobo MD  Blood Pressure: 124/72 mmHg  Height: 163 cm  Weight: 93 kg  BSA: 2 m2  ------------------------------------------------------------------------  PROCEDURE: Transthoracic echocardiogram with 2-D, M-Mode  and complete spectral and color flow Doppler.  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 Mild mitral  regurgitation.  Aortic Valve/Aorta: Calcified trileaflet aortic valve with  normal opening. Mild aortic regurgitation.  Normal aortic root size. (Ao: 2.9 cm at the sinuses of  Valsalva).  Left Atrium: Normal left atrium.  LA volume index = 26  cc/m2.  Left Ventricle: Normal left ventricular systolic function.  No segmental wall motion abnormalities. Normal left  ventricular internal dimensions and wall thicknesses.  Right Heart: Normal right atrium. The right ventricle is  not well visualized; grossly normal right ventricular  systolic function. Normal tricuspid valve. Mild-moderate  tricuspid regurgitation. Pulmonic valve not well  visualized, probably normal. Minimal pulmonic  regurgitation.  Pericardium/Pleura: No pericardial effusion seen. Bilateral  pleural effusions. Ascites seen.  Hemodynamic: Estimated right atrial pressure is 8 mm Hg.  Estimated right ventricular systolic pressure equals 42 mm  Hg, assuming right atrial pressure equals 8 mm Hg,  consistent with mild pulmonary hypertension.  ------------------------------------------------------------------------  Conclusions:  1. Calcified trileaflet aortic valve with normalopening.  Mild aortic regurgitation.  2. Normal left ventricular systolic function. No segmental  wall motion abnormalities.  3. The right ventricle is not well visualized; grossly  normal right ventricular systolic function.  4. No pericardial effusion seen. Bilateral pleural  effusions. Ascites seen.  *** No previous Echo exam.  ------------------------------------------------------------------------  Confirmed on  2018 - 15:24:26 by Petey Cortes M.D.  ------------------------------------------------------------------------    < end of copied text >  ------------------------------------------------------------------------------------------------    MICROBIOLOGY:     Culture - Body Fluid with Gram Stain (18 @ 19:03)    Gram Stain:   polymorphonuclear leukocytes seen  No organisms seen  by cytocentrifuge    Specimen Source: Peritoneal Peritoneal Fluid    Culture Results:   No growth    Culture - Acid Fast - Body Fluid w/Smear (18 @ 19:03)    Specimen Source: .Body Fluid Peritoneal Fluid    Acid Fast Bacilli Smear:   No acid fast bacilli seen by fluorochrome stain    Urinalysis Basic - ( 15 Feb 2018 16:13 )    Color: Karo / Appearance: Clear / S.016 / pH: x  Gluc: x / Ketone: Negative  / Bili: Small / Urobili: 1.0 mg/dL   Blood: x / Protein: Negative mg/dL / Nitrite: Negative   Leuk Esterase: Negative / RBC: 5 /HPF / WBC 2 /HPF   Sq Epi: x / Non Sq Epi: 21 /HPF / Bacteria: Occasional    Rapid Respiratory Viral Panel (18 @ 17:08)    Rapid RVP Result: NotDetec: The FilmArray RVP Rapid uses polymerase chain reaction (PCR) and melt  curve analysis to screen for adenovirus; coronavirus HKU1, NL63, 229E,  OC43; human metapneumovirus (hMPV); human enterovirus/rhinovirus  (Entero/RV); influenza A; influenza A/H1;influenza A/H3; influenza  A/H1-2009; influenza B; parainfluenza viruses 1, 2, 3, 4; respiratory  syncytial virus; Bordetella pertussis; Mycoplasma pneumoniae; and  Chlamydophila pneumoniae.    RADIOLOGY:  [x] Chest radiographs reviewed and interpreted by me    < from: US Renal (02.15.18 @ 16:27) >    EXAM:  US KIDNEY(S)                            PROCEDURE DATE:  02/15/2018      INTERPRETATION:  CLINICAL INFORMATION: Acute kidney injury.    COMPARISON: CT abdomen pelvis 2018    TECHNIQUE: Sonography of the kidneys and bladder.     FINDINGS:    Right kidney: 9.3 cm. Increased echogenicity with cortical thinning No   renal mass, hydronephrosis or calculi.    Left kidney:  10.8 cm. Increased echogenicity with cortical thinning. No   renal mass, hydronephrosis or calculi.    Urinary bladder: Within normal limits.    Miscellaneous: Hepatic lesions consistent with known metastases are noted   as well as moderate findings ascites and a left pleural effusion.    IMPRESSION:     Echogenic kidneys consistent with renal parenchymal disease. No   hydronephrosis.    JARETT SUSAN M.D., ATTENDING RADIOLOGIST  This document has been electronically signed. Feb 15 2018  4:23PM      < end of copied text >  ---------------------------------------------------------------------------------------------------------  < from: NM Pulmonary Ventilation/Perfusion Scan (18 @ 10:56) >    EXAM:  NM PULM VENTILATION PERFUS IMG                            PROCEDURE DATE:  2018      INTERPRETATION:  RADIOPHARMACEUTICAL: 1 mCi Tc-99m-DTPA aerosol by   inhalation; 6 mCi Tc-99m-MAA, I.V.    CLINICAL STATEMENT: 72-year-old female with shortness of breath and   hypoxia.    IMPRESSION: Abnormal ventilation/perfusion lung scan.    Very low probability of pulmonary embolus.    YANELY TILLEY M.D., NUCLEAR MEDICINE ATTENDING  This document has been electronically signed. 2018 11:19AM      < end of copied text >  ---------------------------------------------------------------------------------------------------------    < from: CT Abdomen and Pelvis No Cont (18 @ 20:02) >    EXAM:  CT ABDOMEN AND PELVIS                            PROCEDURE DATE:  2018        INTERPRETATION:  CLINICAL INFORMATION: Elevated bilirubin. History of   breast cancer. Shortness of breath.    COMPARISON: None.    PROCEDURE:   CT of the Abdomen and Pelvis was performed without intravenous contrast.   Intravenous contrast: None.  Oral contrast: None.  Sagittal and coronal reformats were performed.    FINDINGS:    LOWER CHEST: Small left pleural effusion. Left basilar consolidation may   represent atelectasis or pneumonia. Coronary artery calcifications. Right   mastectomy.    LIVER: Nodular hepatic contour. Multiple bilobar ill-defined hypodense   hepatic lesions compatible with metastatic disease. Two lesions in the   right hepatic lobe demonstrate peripheral calcification.  BILE DUCTS: Normal caliber.  GALLBLADDER: Collapsed and therefore not well evaluated.  SPLEEN: Within normal limits.  PANCREAS: Within normal limits.  ADRENALS: Within normal limits.  KIDNEYS/URETERS: Subcentimeter hyperdense left renal focus too small to   characterize.    BLADDER: Within normal limits.  REPRODUCTIVE ORGANS: Anterior fundal fibroid.    BOWEL: Diverticulosis without diverticulitis. No bowel obstruction.   Appendix is not identified.  PERITONEUM: Moderate volume ascites.  VESSELS:  Atheromatous disease of the aorta.  RETROPERITONEUM: No lymphadenopathy.    ABDOMINAL WALL: Within normal limits.  BONES: Sclerotic osseous metastases in multiple vertebral bodies,   sternum, and ribs.    IMPRESSION:   Bilobar hepatic metastases.  Sclerotic osseous metastases.  Small left pleural effusion. Left basilar consolidation may represent   atelectasis or pneumonia.       ANAMARIA MORRIS M.D., RADIOLOGY RESIDENT  This document has been electronically signed.  MAGDY KEN M.D., ATTENDING RADIOLOGIST  This document has been electronically signed. 2018  8:27PM      < end of copied text >  ---------------------------------------------------------------------------------------------------------    < from: US Abdomen Doppler (18 @ 15:18) >    EXAM:  DUPLEX SCAN EXT VEINS LOWER BI                          EXAM:  US DPLX ABDOMEN                          PROCEDURE DATE:  2018      INTERPRETATION:  CLINICAL INFORMATION: Increasing liver function tests.   History of metastatic liver disease. Evaluation for portal vein   thrombosis.     EXAM: Focused grayscale, color, and spectral Doppler ultrasound imaging   was performed of the splenic, portal, and hepatic veins.    COMPARISON: CT abdomen pelvis dated 2018.    FINDINGS:    There is redemonstration of partially imaged metastatic liver disease,   nodular liver contour, and ascites.    The splenic, portal, hepatic veins are patent. There is no evidence of   thrombosis. The splenic and portal veins demonstrate hepatopedal flow.   Main portal vein measures 1.2 cm. The main portal vein has a peak   systolic velocity of 20 cm/s with phasic blood flow. The right and left   portal veins are patent.    Peak systolic velocity in the hepatic artery is 116.7 cm/s.    The intrahepatic IVC is patent.    IMPRESSION:    No evidence of splenic, portal, or hepatic vein thrombosis.    MILTON LAGOS M.D., RADIOLOGY RESIDENT  This document has been electronically signed.  SOFI REYEZ M.D., ATTENDING RADIOLOGIST  This document has been electronically signed. 2018  1:00PM      < end of copied text >  ---------------------------------------------------------------------------------------------------------

## 2018-02-19 NOTE — CONSULT NOTE ADULT - ASSESSMENT
71 yo female with advanced breast cancer with dyspnea,failure to thrive,and possible pneumonia.  She has extensive liver mets,ascites, and restrictive lung disease, hence multiple reasons for dyspnea and anorexia.  Patchy GG opacities, possible infection,also has effusions and alelectasis.  I am not sure she will improve with antibiotics but I think a therapeutic trial of cefepime is reasonable.  dyspnea likely multifactorial as pointed out by pulmonary  Suggest:  1.cefepime renally dosed  2.legionella urine antigen and fungitell assay,a biomarker which is high in fungal infections  3.she may warrant PCP prophylaxis  4.Extent of disease worrisome.  5.ID issues reviewed with  and daughter 73 yo female with advanced breast cancer with dyspnea,failure to thrive,and possible pneumonia.  She has extensive liver mets,ascites, and restrictive lung disease, hence multiple reasons for dyspnea and anorexia.  Patchy GG opacities, possible infection,also has effusions and alelectasis.  I am not sure she will improve with antibiotics but I think a therapeutic trial of cefepime is reasonable.  dyspnea likely multifactorial as pointed out by pulmonary  Suggest:  1.cefepime renally dosed  2.legionella urine antigen , aspergillus antibody and  fungitell assay, biomarkers which  are  high in fungal infections  3.she may warrant PCP prophylaxis  4.Extent of disease worrisome.  5.ID issues reviewed with  and daughter

## 2018-02-19 NOTE — CHART NOTE - NSCHARTNOTEFT_GEN_A_CORE
Patient with very minimal po intake in the past two days as per family. Lantus dose decreased to 15 units for lack of PO intake. Insulin sliding changed to q 6 hours for the time being while the patient is not eating. Diet to remain the same. Consider decreasing standing dose of lantus. Will continue to monitor overnight.   Korin Centeno PA-C  [id number]

## 2018-02-19 NOTE — CONSULT NOTE ADULT - SUBJECTIVE AND OBJECTIVE BOX
HPI:   Patient is a 72y female with a past history of breast cancer with mets to liver,bone,and lung,admitted 1 week ago with anorexia,dyspnea and increased edema,who on CT scan has ground glass changes that may reflect pneumonia.She has been followed by pulmonary,renal,IM,Heme-Onc and cardiology.There have been attempts to diurese, she has developed an PIERRE.pulmonary has felt dyspnea is likely on non infectious basis.She has been given prednisone prior to admission.She has received RT to chest, is s/p rt mastectomy,and has a dry cough.No recent travel, she appears juandiced and c/o anorexia.Her  relays that lasix and albumin will be tried.No fever or history of chronic lung disease.    REVIEW OF SYSTEMS:  All other review of systems negative (Comprehensive ROS)    PAST MEDICAL & SURGICAL HISTORY:  Depression  Tremor  Endogenous hyperlipemia  Hypertension  Breast cancer  Type 2 diabetes mellitus  Portacath in place: left upper chest  S/P left cataract extraction  H/O oral surgery: wisdom tooth removal  H/O craniotomy  H/O mastectomy, right      Allergies    Levaquin (Short breath)  penicillin (Swelling)    Intolerances        Antimicrobials Day #  :day 1  cefepime  IVPB 1000 milliGRAM(s) IV Intermittent every 12 hours    Other Medications:  albumin human  5% IVPB 250 milliLiter(s) IV Intermittent every 6 hours  ALBUTerol/ipratropium for Nebulization 3 milliLiter(s) Nebulizer every 6 hours  buDESOnide   0.5 milliGRAM(s) Respule 0.5 milliGRAM(s) Inhalation every 12 hours  dextrose 5%. 1000 milliLiter(s) IV Continuous <Continuous>  dextrose 50% Injectable 12.5 Gram(s) IV Push once  dextrose 50% Injectable 25 Gram(s) IV Push once  dextrose 50% Injectable 25 Gram(s) IV Push once  dextrose Gel 1 Dose(s) Oral once PRN  docusate sodium 100 milliGRAM(s) Oral three times a day  escitalopram 20 milliGRAM(s) Oral daily  furosemide   Injectable 60 milliGRAM(s) IV Push every 12 hours  glucagon  Injectable 1 milliGRAM(s) IntraMuscular once PRN  insulin glargine Injectable (LANTUS) 32 Unit(s) SubCutaneous at bedtime  insulin lispro (HumaLOG) corrective regimen sliding scale   SubCutaneous three times a day before meals  insulin lispro (HumaLOG) corrective regimen sliding scale   SubCutaneous at bedtime  nystatin Powder 1 Application(s) Topical two times a day  pantoprazole    Tablet 40 milliGRAM(s) Oral before breakfast  polyethylene glycol 3350 17 Gram(s) Oral at bedtime PRN  predniSONE   Tablet 50 milliGRAM(s) Oral daily  propranolol 10 milliGRAM(s) Oral daily  senna 2 Tablet(s) Oral at bedtime PRN      FAMILY HISTORY:  Family history of breast cancer (Father)      SOCIAL HISTORY:  Smoking:  no   ETOH: no    Drug Use: no         T(F): 97.4 (02-19-18 @ 13:47), Max: 98.1 (02-19-18 @ 08:48)  HR: 81 (02-19-18 @ 14:38)  BP: 125/73 (02-19-18 @ 13:47)  RR: 16 (02-19-18 @ 13:47)  SpO2: 96% (02-19-18 @ 14:38)  Wt(kg): --    PHYSICAL EXAM:  General: alert, no acute distress, chronically ill appearing  Eyes:  icteric, no conjunctival injection, no discharge  Oropharynx: no lesions or injection 	  Neck: supple, without adenopathy  Lungs: coarse BS,diminished at bases  Heart: regular rate and rhythm; no murmur, rubs or gallops  Abdomen: soft, mild distension, nontender, without mass or organomegaly  Skin: juandiced  Extremities: no clubbing, cyanosis, 2+edema  Neurologic: alert, oriented, moves all extremities    LAB RESULTS:                        9.0    8.4   )-----------( 51       ( 19 Feb 2018 06:37 )             26.0     02-19    138  |  95<L>  |  78<H>  ----------------------------<  111<H>  3.8   |  26  |  1.82<H>    Ca    9.3      19 Feb 2018 06:38            MICROBIOLOGY:  RECENT CULTURES:  02-16 @ 19:03 .Body Fluid Peritoneal Fluid     No growth    polymorphonuclear leukocytes seen  No organisms seen  by cytocentrifuge          RADIOLOGY REVIEWED:  < from: CT Chest No Cont (02.19.18 @ 11:27) >  COMPARISON: CT abdomen pelvis 2/13/2018. Chest x-ray 2/18/2018.    FINDINGS:    LUNGS AND LARGE AIRWAYS: There is near complete occlusion of the left   lower lobe bronchus and partial collapse of the left lower lobe. There is   patchy opacity in the left lower lobe which may represent pneumonia..   Segmental atelectasis is noted within the bilateral upper lobes.   Nonspecific peripheral patchy and groundglass opacities in the right   upper lobe.  PLEURA: Moderate bilateral pleural effusions with associated lower lobe   passive atelectasis.  VESSELS: Atherosclerotic calcifications of the aorta and coronary   arteries.  HEART: Heart size is normal. Trace pericardial effusion.  MEDIASTINUM AND KENISHA: No lymphadenopathy.  CHEST WALL AND LOWER NECK: Left chest wall Mediport catheter distal tip   is in the SVC. Status post right mastectomy. Subcutaneous edema.  UPPER ABDOMEN: Cirrhotic liver with multiple hyper and hypodense lesions   is unchanged. Moderate volume perihepatic and perisplenic ascites.  BONES: Sclerotic osseous metastases, most predominantly in the T6 and T8   vertebral bodies, as well as in the sternum.    IMPRESSION:    Moderate bilateral pleural effusions with bilateral lower and upper lobe   atelectasis. Left lower lobe atelectasis may be secondary to a left lower   lobe bronchus mucous plug.    Patchy groundglass opacities in the right upper lobe and left lower lobe   may be secondary to infection.    Hepatic and blastic osseous metastases.    < end of copied text >  < from: US Renal (02.15.18 @ 16:27) >    IMPRESSION:     Echogenic kidneys consistent with renal parenchymal disease. No   hydronephrosis.    < end of copied text >  < from: NM Pulmonary Ventilation/Perfusion Scan (02.14.18 @ 10:56) >  IMPRESSION: Abnormal ventilation/perfusion lung scan.    Very low probability of pulmonary embolus.

## 2018-02-20 DIAGNOSIS — J98.11 ATELECTASIS: ICD-10-CM

## 2018-02-20 DIAGNOSIS — E11.9 TYPE 2 DIABETES MELLITUS WITHOUT COMPLICATIONS: ICD-10-CM

## 2018-02-20 LAB
ALBUMIN SERPL ELPH-MCNC: 3.8 G/DL — SIGNIFICANT CHANGE UP (ref 3.3–5)
ALP SERPL-CCNC: 174 U/L — HIGH (ref 40–120)
ALT FLD-CCNC: 34 U/L RC — SIGNIFICANT CHANGE UP (ref 10–45)
AMMONIA BLD-MCNC: 61 UMOL/L — HIGH (ref 11–55)
ANION GAP SERPL CALC-SCNC: 16 MMOL/L — SIGNIFICANT CHANGE UP (ref 5–17)
AST SERPL-CCNC: 59 U/L — HIGH (ref 10–40)
BILIRUB DIRECT SERPL-MCNC: 3.9 MG/DL — HIGH (ref 0–0.2)
BILIRUB INDIRECT FLD-MCNC: 3.4 MG/DL — HIGH (ref 0.2–1)
BILIRUB SERPL-MCNC: 7.3 MG/DL — HIGH (ref 0.2–1.2)
BUN SERPL-MCNC: 86 MG/DL — HIGH (ref 7–23)
CALCIUM SERPL-MCNC: 9.8 MG/DL — SIGNIFICANT CHANGE UP (ref 8.4–10.5)
CHLORIDE SERPL-SCNC: 96 MMOL/L — SIGNIFICANT CHANGE UP (ref 96–108)
CO2 SERPL-SCNC: 28 MMOL/L — SIGNIFICANT CHANGE UP (ref 22–31)
CREAT SERPL-MCNC: 1.86 MG/DL — HIGH (ref 0.5–1.3)
GLUCOSE BLDC GLUCOMTR-MCNC: 136 MG/DL — HIGH (ref 70–99)
GLUCOSE BLDC GLUCOMTR-MCNC: 153 MG/DL — HIGH (ref 70–99)
GLUCOSE BLDC GLUCOMTR-MCNC: 154 MG/DL — HIGH (ref 70–99)
GLUCOSE BLDC GLUCOMTR-MCNC: 164 MG/DL — HIGH (ref 70–99)
GLUCOSE BLDC GLUCOMTR-MCNC: 167 MG/DL — HIGH (ref 70–99)
GLUCOSE BLDC GLUCOMTR-MCNC: 198 MG/DL — HIGH (ref 70–99)
GLUCOSE SERPL-MCNC: 142 MG/DL — HIGH (ref 70–99)
HCT VFR BLD CALC: 25.8 % — LOW (ref 34.5–45)
HGB BLD-MCNC: 8.8 G/DL — LOW (ref 11.5–15.5)
MCHC RBC-ENTMCNC: 34.3 GM/DL — SIGNIFICANT CHANGE UP (ref 32–36)
MCHC RBC-ENTMCNC: 38.5 PG — HIGH (ref 27–34)
MCV RBC AUTO: 112 FL — HIGH (ref 80–100)
PLATELET # BLD AUTO: 55 K/UL — LOW (ref 150–400)
POTASSIUM SERPL-MCNC: 3.9 MMOL/L — SIGNIFICANT CHANGE UP (ref 3.5–5.3)
POTASSIUM SERPL-SCNC: 3.9 MMOL/L — SIGNIFICANT CHANGE UP (ref 3.5–5.3)
PROT SERPL-MCNC: 5.6 G/DL — LOW (ref 6–8.3)
RAPID RVP RESULT: SIGNIFICANT CHANGE UP
RBC # BLD: 2.3 M/UL — LOW (ref 3.8–5.2)
RBC # FLD: 22.3 % — HIGH (ref 10.3–14.5)
SODIUM SERPL-SCNC: 140 MMOL/L — SIGNIFICANT CHANGE UP (ref 135–145)
WBC # BLD: 7.5 K/UL — SIGNIFICANT CHANGE UP (ref 3.8–10.5)
WBC # FLD AUTO: 7.5 K/UL — SIGNIFICANT CHANGE UP (ref 3.8–10.5)

## 2018-02-20 RX ORDER — ONDANSETRON 8 MG/1
4 TABLET, FILM COATED ORAL EVERY 6 HOURS
Qty: 0 | Refills: 0 | Status: DISCONTINUED | OUTPATIENT
Start: 2018-02-20 | End: 2018-02-27

## 2018-02-20 RX ORDER — CEFEPIME 1 G/1
1000 INJECTION, POWDER, FOR SOLUTION INTRAMUSCULAR; INTRAVENOUS EVERY 12 HOURS
Qty: 0 | Refills: 0 | Status: DISCONTINUED | OUTPATIENT
Start: 2018-02-20 | End: 2018-02-27

## 2018-02-20 RX ADMIN — Medication 60 MILLIGRAM(S): at 17:48

## 2018-02-20 RX ADMIN — NYSTATIN CREAM 1 APPLICATION(S): 100000 CREAM TOPICAL at 05:08

## 2018-02-20 RX ADMIN — Medication 125 MILLILITER(S): at 06:38

## 2018-02-20 RX ADMIN — PANTOPRAZOLE SODIUM 40 MILLIGRAM(S): 20 TABLET, DELAYED RELEASE ORAL at 05:09

## 2018-02-20 RX ADMIN — Medication 3 MILLILITER(S): at 12:10

## 2018-02-20 RX ADMIN — Medication 1: at 12:09

## 2018-02-20 RX ADMIN — Medication 1: at 00:35

## 2018-02-20 RX ADMIN — Medication 0.5 MILLIGRAM(S): at 05:08

## 2018-02-20 RX ADMIN — Medication 50 MILLIGRAM(S): at 05:07

## 2018-02-20 RX ADMIN — Medication 125 MILLILITER(S): at 00:34

## 2018-02-20 RX ADMIN — Medication 3 MILLILITER(S): at 05:08

## 2018-02-20 RX ADMIN — Medication 1: at 18:27

## 2018-02-20 RX ADMIN — ONDANSETRON 4 MILLIGRAM(S): 8 TABLET, FILM COATED ORAL at 14:40

## 2018-02-20 RX ADMIN — Medication 125 MILLILITER(S): at 18:27

## 2018-02-20 RX ADMIN — Medication 10 MILLIGRAM(S): at 05:08

## 2018-02-20 RX ADMIN — ESCITALOPRAM OXALATE 20 MILLIGRAM(S): 10 TABLET, FILM COATED ORAL at 12:09

## 2018-02-20 RX ADMIN — Medication 3 MILLILITER(S): at 17:48

## 2018-02-20 RX ADMIN — Medication 125 MILLILITER(S): at 12:08

## 2018-02-20 RX ADMIN — CEFEPIME 100 MILLIGRAM(S): 1 INJECTION, POWDER, FOR SOLUTION INTRAMUSCULAR; INTRAVENOUS at 17:49

## 2018-02-20 RX ADMIN — CEFEPIME 100 MILLIGRAM(S): 1 INJECTION, POWDER, FOR SOLUTION INTRAMUSCULAR; INTRAVENOUS at 05:07

## 2018-02-20 RX ADMIN — Medication 60 MILLIGRAM(S): at 05:10

## 2018-02-20 RX ADMIN — Medication 3 MILLILITER(S): at 00:35

## 2018-02-20 RX ADMIN — INSULIN GLARGINE 15 UNIT(S): 100 INJECTION, SOLUTION SUBCUTANEOUS at 00:34

## 2018-02-20 RX ADMIN — NYSTATIN CREAM 1 APPLICATION(S): 100000 CREAM TOPICAL at 17:18

## 2018-02-20 RX ADMIN — Medication 0.5 MILLIGRAM(S): at 17:48

## 2018-02-20 NOTE — PROGRESS NOTE ADULT - PROBLEM SELECTOR PLAN 1
Pre REnal vs ATN ( gemcitabine /TMA) vs HRS. no hydro on renal sono-reviewed. inc echogenicity, likely underlying CKD  U lytes noted- intravascularly depleted.  CT chest with b/l pleural effusions  Cr stable but higher then baseline  no indication for RRT/dialysis at this time  c/w albumin again q6h , but given b/l mod plerual effusions will also give lasix 60mg iv bid and monitor   keep off nephrotoxins  check urine lytes daily ( urine na/cr/cl/osm/k)  dose all meds for eGFR <15ml/min  avoid ACEi/ARB/NSAIDs/nephrotoxics

## 2018-02-20 NOTE — PROGRESS NOTE ADULT - SUBJECTIVE AND OBJECTIVE BOX
CARDIOLOGY FOLLOW UP - Dr. Amaral    CC no chest pain or sob        PHYSICAL EXAM:  T(C): 36.9 (02-20-18 @ 14:24), Max: 36.9 (02-20-18 @ 14:24)  HR: 70 (02-20-18 @ 14:24) (70 - 82)  BP: 105/69 (02-20-18 @ 14:24) (98/62 - 125/75)  RR: 18 (02-20-18 @ 14:24) (16 - 18)  SpO2: 95% (02-20-18 @ 14:24) (93% - 95%)  Wt(kg): --  I&O's Summary    19 Feb 2018 07:01  -  20 Feb 2018 07:00  --------------------------------------------------------  IN: 205 mL / OUT: 0 mL / NET: 205 mL        Appearance: NAD 	  Cardiovascular: Normal S1 S2,RRR   Respiratory: diminished   Gastrointestinal:  Soft, Non-tender, + BS	  Extremities: + 3 bl  LE pitting edema        MEDICATIONS  (STANDING):  albumin human  5% IVPB 250 milliLiter(s) IV InterCTmittent every 6 hours  ALBUTerol/ipratropium for Nebulization 3 milliLiter(s) Nebulizer every 6 hours  buDESOnide   0.5 milliGRAM(s) Respule 0.5 milliGRAM(s) Inhalation every 12 hours  cefepime  IVPB 1000 milliGRAM(s) IV Intermittent every 12 hours  dextrose 5%. 1000 milliLiter(s) (50 mL/Hr) IV Continuous <Continuous>  dextrose 50% Injectable 12.5 Gram(s) IV Push once  dextrose 50% Injectable 25 Gram(s) IV Push once  dextrose 50% Injectable 25 Gram(s) IV Push once  docusate sodium 100 milliGRAM(s) Oral three times a day  escitalopram 20 milliGRAM(s) Oral daily  furosemide   Injectable 60 milliGRAM(s) IV Push every 12 hours  insulin glargine Injectable (LANTUS) 32 Unit(s) SubCutaneous at bedtime  insulin lispro (HumaLOG) corrective regimen sliding scale   SubCutaneous every 6 hours  nystatin Powder 1 Application(s) Topical two times a day  pantoprazole    Tablet 40 milliGRAM(s) Oral before breakfast  predniSONE   Tablet 50 milliGRAM(s) Oral daily  propranolol 10 milliGRAM(s) Oral daily      TELEMETRY: 	    ECG:  	  RADIOLOGY:   DIAGNOSTIC TESTING:  [ ] Echocardiogram:  [ ]  Catheterization:  [ ] Stress Test:    OTHER: 	    < from: CT Chest No Cont (02.19.18 @ 11:27) >  IMPRESSION:    Moderate bilateral pleural effusions with bilateral lower and upper lobe   atelectasis. Left lower lobe atelectasis may be secondary to a left lower   lobe bronchus mucous plug.    Patchy groundglass opacities in the right upper lobe and left lower lobe   may be secondary to infection.    Hepatic and blastic osseous metastases.                    < end of copied text >  :	 	                  LABS              8.8    7.5   )-----------( 55       ( 20 Feb 2018 06:35 )             25.8     02-20    140  |  96  |  86<H>  ----------------------------<  142<H>  3.9   |  28  |  1.86<H>    Ca    9.8      20 Feb 2018 06:35    TPro  5.6<L>  /  Alb  3.8  /  TBili  7.3<H>  /  DBili  3.9<H>  /  AST  59<H>  /  ALT  34  /  AlkPhos  174<H>  02-20

## 2018-02-20 NOTE — PROGRESS NOTE ADULT - ASSESSMENT
· Assessment		  73 yo female with PMH of breast cancer with mets to lung, liver, bones, s/p mastectomy, s/p depression, HTN, HLD, T2DM, presents with SOB and LE swelling.  Pneumonia/ Mucus plug- start empiric antibiotics.  Pulmonary follow.  ID evaluation called Dr. Child.  cardiology follow Dr. Amaral       ·  Problem: CHF exacerbation.   monitor ins/outs and daily weight   cardiology follow Dr. Amaral        ·  Problem: PIERRE (acute kidney injury).  Plan: baseline creatinine likely 0.8  unclear etiology of PIERRE, ? metastases; CT scan shows hyperdense lesions  will monitor BMP  Nephrology follow Dr. Taylor noted.  supplement albumin/ Lasix      ·  Problem: Elevated LFTs.  Plan: likely due to metastasis  no biliary obstruction seen on CT scan  monitor LFTs in am    Problem: Type 2 diabetes mellitus.  sliding scale      ·  Problem: Depression.  Plan: escitalopram.     ·  Problem: Thrombocytopenia.  Plan: platelet was 89 on 2/1/18  ?chemo induced  will monitor for now.     Speech difficulty- discussed with Dr. Whitney. Patient had recent MRI brain normal. Neurology follow noted.      ·  Problem: Prophylactic measure.  Plan: no AC at this time, due to rapidly worsening thrombocytopenia  monitor platelet for now.    Metastatic breast cancer- Oncology follow. Dr. Christensen group noted     Order for DNR/DNI placed.  Constipation- bowel regimen  Nausea control  Urinary retention- Peters  PT  d/w /daughter QA    Rick Jacobo MD pager [phone]

## 2018-02-20 NOTE — PROGRESS NOTE ADULT - SUBJECTIVE AND OBJECTIVE BOX
Patient seen and examined  has some sob but improved from yesterday  denies any nausea or vomiting    Levaquin (Short breath)  penicillin (Swelling)    Hospital Medications:   MEDICATIONS  (STANDING):  albumin human  5% IVPB 250 milliLiter(s) IV Intermittent every 6 hours  ALBUTerol/ipratropium for Nebulization 3 milliLiter(s) Nebulizer every 6 hours  buDESOnide   0.5 milliGRAM(s) Respule 0.5 milliGRAM(s) Inhalation every 12 hours  cefepime  IVPB 1000 milliGRAM(s) IV Intermittent every 12 hours  dextrose 5%. 1000 milliLiter(s) (50 mL/Hr) IV Continuous <Continuous>  dextrose 50% Injectable 12.5 Gram(s) IV Push once  dextrose 50% Injectable 25 Gram(s) IV Push once  dextrose 50% Injectable 25 Gram(s) IV Push once  docusate sodium 100 milliGRAM(s) Oral three times a day  escitalopram 20 milliGRAM(s) Oral daily  furosemide   Injectable 60 milliGRAM(s) IV Push every 12 hours  insulin glargine Injectable (LANTUS) 32 Unit(s) SubCutaneous at bedtime  insulin lispro (HumaLOG) corrective regimen sliding scale   SubCutaneous every 6 hours  nystatin Powder 1 Application(s) Topical two times a day  pantoprazole    Tablet 40 milliGRAM(s) Oral before breakfast  predniSONE   Tablet 50 milliGRAM(s) Oral daily  propranolol 10 milliGRAM(s) Oral daily        VITALS:  T(F): 97.3 (18 @ 11:21), Max: 97.8 (18 @ 04:59)  HR: 73 (18 @ 11:21)  BP: 102/66 (18 @ 11:21)  RR: 17 (18 @ 11:21)  SpO2: 94% (18 @ 11:21)  Wt(kg): --     @ 07:01  -   @ 07:00  --------------------------------------------------------  IN: 205 mL / OUT: 0 mL / NET: 205 mL    PHYSICAL EXAM:  Constitutional: NAD  HEENT: anicteric sclera, oropharynx clear, MMM  Neck: No JVD  Respiratory: rt lung mild wheezing, dec bibasilar BS, no crepts appreciated  Cardiovascular: S1, S2, RRR  Gastrointestinal: BS+, soft, NT, obese  Extremities: 3+ pitting edema b/l  Neurological: A/O x 3, no focal deficits  Psychiatric: Normal mood, normal affect  : No CVA tenderness. No carty.   Skin: No rashes    LABS:      140  |  96  |  86<H>  ----------------------------<  142<H>  3.9   |  28  |  1.86<H>    Ca    9.8      2018 06:35    TPro  5.6<L>  /  Alb  3.8  /  TBili  7.3<H>  /  DBili  3.9<H>  /  AST  59<H>  /  ALT  34  /  AlkPhos  174<H>      Creatinine Trend: 1.86 <--, 1.82 <--, 1.94 <--, 2.01 <--, 1.99 <--, 2.45 <--, 1.75 <--, 1.67 <--                        8.8    7.5   )-----------( 55       ( 2018 06:35 )             25.8     Urine Studies:  Urinalysis Basic - ( 15 Feb 2018 16:13 )    Color: Karo / Appearance: Clear / S.016 / pH:   Gluc:  / Ketone: Negative  / Bili: Small / Urobili: 1.0 mg/dL   Blood:  / Protein: Negative mg/dL / Nitrite: Negative   Leuk Esterase: Negative / RBC: 5 /HPF / WBC 2 /HPF   Sq Epi:  / Non Sq Epi: 21 /HPF / Bacteria: Occasional      Sodium, Random Urine: <20 mmol/L ( @ 06:40)  Chloride, Random Urine: <35 mmol/L ( @ 06:40)  Potassium, Random Urine: 44 mmol/L ( @ 06:40)  Creatinine, Random Urine: 127 mg/dL ( @ 06:40)  Sodium, Random Urine: <20 mmol/L ( @ 10:26)  Chloride, Random Urine: <20 mmol/L ( @ 10:26)  Creatinine, Random Urine: 118 mg/dL ( @ 10:26)  Osmolality, Random Urine: 386 mos/kg ( @ 10:26)  Potassium, Random Urine: 49 mmol/L ( @ 10:26)  Potassium, Random Urine: 45 mmol/L ( @ 17:11)  Creatinine, Random Urine: 101 mg/dL (02-15 @ 16:13)  Protein/Creatinine Ratio Calculation: 0.1 Ratio (02-15 @ :)  Chloride, Random Urine: <20 mmol/L (02-15 @ 16:13)  Sodium, Random Urine: <20 mmol/L (02-15 @ 16:13)  Osmolality, Random Urine: 420 mos/kg (02-15 @ 16:13)  Potassium, Random Urine: 36 mmol/L (02-15 @ 16:13)    RADIOLOGY & ADDITIONAL STUDIES:

## 2018-02-20 NOTE — PROGRESS NOTE ADULT - PROBLEM SELECTOR PLAN 1
-- s/p multiple chemotherapies most recently gemzar, radiation    Elevated LFTs, bilirubin --- will d/w family again pt's wishes for further therapy vs palliative care

## 2018-02-20 NOTE — PROGRESS NOTE ADULT - SUBJECTIVE AND OBJECTIVE BOX
CC: f/u for possible pneumonia    Patient remains quite weak, debilitated, but denies pain    REVIEW OF SYSTEMS:  limited    Antimicrobials Day # 2  cefepime  IVPB 1000 milliGRAM(s) IV Intermittent every 12 hours    Other Medications Reviewed    T(F): 98.4 (02-20-18 @ 14:24), Max: 98.4 (02-20-18 @ 14:24)  HR: 70 (02-20-18 @ 14:24)  BP: 105/69 (02-20-18 @ 14:24)  RR: 18 (02-20-18 @ 14:24)  SpO2: 95% (02-20-18 @ 14:24)  Wt(kg): --    PHYSICAL EXAM:  General: alert, no acute distress, jaundiced  Eyes:  icteric, no conjunctival injection, no discharge  Oropharynx: no lesions or injection 	  Neck: supple, without adenopathy  Lungs: diminished bases  Heart: regular rate and rhythm; no murmur, rubs or gallops  Abdomen: soft, nondistended, nontender, without mass or organomegaly  Skin: no lesions  Extremities: leg edema  Neurologic: somnolent, arouses easily, moves all extremities    LAB RESULTS:                        8.8    7.5   )-----------( 55       ( 20 Feb 2018 06:35 )             25.8     02-20    140  |  96  |  86<H>  ----------------------------<  142<H>  3.9   |  28  |  1.86<H>    Ca    9.8      20 Feb 2018 06:35    TPro  5.6<L>  /  Alb  3.8  /  TBili  7.3<H>  /  DBili  3.9<H>  /  AST  59<H>  /  ALT  34  /  AlkPhos  174<H>  02-20    LIVER FUNCTIONS - ( 20 Feb 2018 06:35 )  Alb: 3.8 g/dL / Pro: 5.6 g/dL / ALK PHOS: 174 U/L / ALT: 34 U/L RC / AST: 59 U/L / GGT: x             MICROBIOLOGY:  RECENT CULTURES:  02-16 @ 19:03 .Body Fluid Peritoneal Fluid     No growth    polymorphonuclear leukocytes seen  No organisms seen  by cytocentrifuge        RADIOLOGY REVIEWED:  CT Chest No Cont (02.19.18 @ 11:27) >  Moderate bilateral pleural effusions with bilateral lower and upper lobe   atelectasis. Left lower lobe atelectasis may be secondary to a left lower   lobe bronchus mucous plug.    Patchy groundglass opacities in the right upper lobe and left lower lobe   may be secondary to infection.    Hepatic and blastic osseous metastases.

## 2018-02-20 NOTE — PROGRESS NOTE ADULT - ASSESSMENT
71 yo female with advanced breast cancer, dyspnea, failure to thrive, and possible pneumonia.  She has extensive liver mets, ascites, and restrictive lung disease, multiple reasons for dyspnea and anorexia.  Patchy GG opacities on CT, possible infection, although difficult to be specifi   Afebrile, normal WBC  Ascites fluid Cx negative  Liver and renal dysfx likely to remain most limiting    Plan:  Continue empiric Cefepime directed at possible pneumonia  Few parameters to follow  Prognosis guarded- d/w family at bedside

## 2018-02-20 NOTE — PROGRESS NOTE ADULT - ASSESSMENT
ASSESSMENT    failure to thrive    A) respiratory dysfunction    1) now with hacking cough and bronchospasm concerning for a hospital acquired viral syndrome  2) moderate bilateral pleural effusions with atelectasis due to translocation of ascitic fluid across the diaphragm versus accumulation of pleural fluid due to hypoalbuminemia  3) +/- pneumonia  4) restrictive lung disease due to obesity, radiation pneumonitis, decreased diaphragmatic excursion due to ascites and respiratory muscle weakness  5) mucous plugging with near complete occlusion of the left lower lobe bronchus and partial collapse of the left lower lobe    B) cognitive dysfunction with confusion, slurred speech and memory loss - chemo related - r/o hepatic encephalopathy    C) extensive metastatic breast cancer to the bones, liver and lung s/p mastectomy, craniotomy and chest irradiation -  decreased blood flow through the liver with ascites and lower extremity swelling -     D) anemia and thrombocytopenia are likely due to bone marrow replacement by tumor     E) PIERRE    PLAN/RECOMMENDATIONS    oxygen supplementation to keep saturation greater than 92%  check RVP  empiric cefepime  albuterol/atrovent/pulmicort nebs  prednisone 50mg daily  patient is not a candidate for bronchoscopy given poor overall prognosis  on diuretics/albumin  thigh high large ARA stockings/bilateral SCDs  glucose control  bowel regimen    Will follow with you. Plan of care discussed with Dr. Odalis Babin MD, [facility] - [phone]  Pulmonary Medicine

## 2018-02-20 NOTE — PROGRESS NOTE ADULT - SUBJECTIVE AND OBJECTIVE BOX
Patient is a 72y old  Female who presents with a chief complaint of shortness of breath (13 Feb 2018 21:46)       Pt is seen and examined  pt is awake and lying in bed;  pt seems comfortable and denies any complaints at this time; she has eyes closed but opens when name called and answers questions appropriately but quickly closes eyes; still w/ some sob, no cough      REVIEW OF SYSTEMS:    CONSTITUTIONAL: (+) weakness, no fevers or chills  EYES/ENT: No visual changes;  No vertigo or throat pain   NECK: No pain or stiffness  RESPIRATORY: No cough, wheezing, hemoptysis; (+) shortness of breath  CARDIOVASCULAR: No chest pain or palpitations  GASTROINTESTINAL: No abdominal or epigastric pain. No nausea, vomiting, or hematemesis; No diarrhea or constipation. No melena or hematochezia.  GENITOURINARY: No dysuria, frequency or hematuria  NEUROLOGICAL: No numbness; (+) weakness  SKIN: No itching, burning, rashes, or lesions     MEDICATIONS  (STANDING):  albumin human  5% IVPB 250 milliLiter(s) IV Intermittent every 6 hours  ALBUTerol/ipratropium for Nebulization 3 milliLiter(s) Nebulizer every 6 hours  buDESOnide   0.5 milliGRAM(s) Respule 0.5 milliGRAM(s) Inhalation every 12 hours  cefepime  IVPB 1000 milliGRAM(s) IV Intermittent every 12 hours  dextrose 5%. 1000 milliLiter(s) (50 mL/Hr) IV Continuous <Continuous>  dextrose 50% Injectable 12.5 Gram(s) IV Push once  dextrose 50% Injectable 25 Gram(s) IV Push once  dextrose 50% Injectable 25 Gram(s) IV Push once  docusate sodium 100 milliGRAM(s) Oral three times a day  escitalopram 20 milliGRAM(s) Oral daily  furosemide   Injectable 60 milliGRAM(s) IV Push every 12 hours  insulin glargine Injectable (LANTUS) 32 Unit(s) SubCutaneous at bedtime  insulin lispro (HumaLOG) corrective regimen sliding scale   SubCutaneous every 6 hours  nystatin Powder 1 Application(s) Topical two times a day  pantoprazole    Tablet 40 milliGRAM(s) Oral before breakfast  predniSONE   Tablet 50 milliGRAM(s) Oral daily  propranolol 10 milliGRAM(s) Oral daily    MEDICATIONS  (PRN):  dextrose Gel 1 Dose(s) Oral once PRN Blood Glucose LESS THAN 70 milliGRAM(s)/deciliter  glucagon  Injectable 1 milliGRAM(s) IntraMuscular once PRN Glucose LESS THAN 70 milligrams/deciliter  polyethylene glycol 3350 17 Gram(s) Oral at bedtime PRN Constipation  senna 2 Tablet(s) Oral at bedtime PRN Constipation      Allergies    Levaquin (Short breath)  penicillin (Swelling)    Intolerances        Vital Signs Last 24 Hrs  T(C): 36.6 (20 Feb 2018 04:59), Max: 36.7 (19 Feb 2018 08:48)  T(F): 97.8 (20 Feb 2018 04:59), Max: 98.1 (19 Feb 2018 08:48)  HR: 82 (20 Feb 2018 04:59) (72 - 85)  BP: 125/75 (20 Feb 2018 04:59) (98/62 - 125/75)  BP(mean): --  RR: 16 (20 Feb 2018 04:59) (16 - 18)  SpO2: 94% (20 Feb 2018 04:59) (93% - 96%)    PHYSICAL EXAM  General: adult in NAD  HEENT: clear oropharynx, anicteric sclera, pink conjunctiva  Neck: supple  CV: normal S1/S2 with no murmur rubs or gallops  Lungs: positive air movement b/l ant lungs,clear to auscultation, no wheezes, no rales  Abdomen: soft non-tender non-distended, no hepatosplenomegaly  Ext: no clubbing cyanosis; (+)2 edema  Skin: no rashes and no petechiae  Neuro: alert and oriented X 4, no focal deficits      LABS:                          8.8    7.5   )-----------( 55       ( 20 Feb 2018 06:35 )             25.8         Mean Cell Volume : 112.0 fl  Mean Cell Hemoglobin : 38.5 pg  Mean Cell Hemoglobin Concentration : 34.3 gm/dL    Serial CBC's  02-20 @ 06:35  Hct-25.8 / Hgb-8.8 / Plat-55 / RBC-2.30 / WBC-7.5    Serial CBC's  02-19 @ 06:37  Hct-26.0 / Hgb-9.0 / Plat-51 / RBC-2.34 / WBC-8.4    Serial CBC's  02-18 @ 06:40  Hct-25.2 / Hgb-8.8 / Plat-49 / RBC-2.30 / WBC-7.4    Serial CBC's  02-17 @ 08:54  Hct-25.0 / Hgb-8.5 / Plat-49 / RBC-2.37 / WBC-7.89        02-20    140  |  96  |  86<H>  ----------------------------<  142<H>  3.9   |  28  |  1.86<H>    Ca    9.8      20 Feb 2018 06:35    TPro  5.6<L>  /  Alb  3.8  /  TBili  7.3<H>  /  DBili  3.9<H>  /  AST  59<H>  /  ALT  34  /  AlkPhos  174<H>  02-20                                  BLOOD SMEAR INTERPRETATION:       RADIOLOGY & ADDITIONAL STUDIES:    PROCEDURE:   CT of the Chest was performed without intravenous contrast.   Intravenous contrast: None.    Reconstructions were performed in axial thin, axial maximum intensity   projection, sagittal and coronal planes.    COMPARISON: CT abdomen pelvis 2/13/2018. Chest x-ray 2/18/2018.    FINDINGS:    LUNGS AND LARGE AIRWAYS: There is near complete occlusion of the left   lower lobe bronchus and partial collapse of the left lower lobe. There is   patchy opacity in the left lower lobe which may represent pneumonia..   Segmental atelectasis is noted within the bilateral upper lobes.   Nonspecific peripheral patchy and groundglass opacities in the right   upper lobe.  PLEURA: Moderate bilateral pleural effusions with associated lower lobe   passive atelectasis.  VESSELS: Atherosclerotic calcifications of the aorta and coronary   arteries.  HEART: Heart size is normal. Trace pericardial effusion.  MEDIASTINUM AND KENISHA: No lymphadenopathy.  CHEST WALL AND LOWER NECK: Left chest wall Mediport catheter distal tip   is in the SVC. Status post right mastectomy. Subcutaneous edema.  UPPER ABDOMEN: Cirrhotic liver with multiple hyper and hypodense lesions   is unchanged. Moderate volume perihepatic and perisplenic ascites.  BONES: Sclerotic osseous metastases, most predominantly in the T6 and T8   vertebral bodies, as well as in the sternum.    IMPRESSION:    Moderate bilateral pleural effusions with bilateral lower and upper lobe   atelectasis. Left lower lobe atelectasis may be secondary to a left lower   lobe bronchus mucous plug.    Patchy groundglass opacities in the right upper lobe and left lower lobe   may be secondary to infection.    Hepatic and blastic osseous metastases.    Assessment

## 2018-02-20 NOTE — PROGRESS NOTE ADULT - SUBJECTIVE AND OBJECTIVE BOX
Patient is a 72y old  Female who presented with a chief complaint of shortness of breath (13 Feb 2018 21:46)      INTERVAL HPI/OVERNIGHT EVENTS:  still with SOB no cough  resting comfortably in bed, responds to questioning but keeps eyes closed    MEDICATIONS  (STANDING):  albumin human  5% IVPB 250 milliLiter(s) IV Intermittent every 6 hours  ALBUTerol/ipratropium for Nebulization 3 milliLiter(s) Nebulizer every 6 hours  buDESOnide   0.5 milliGRAM(s) Respule 0.5 milliGRAM(s) Inhalation every 12 hours  cefepime  IVPB 1000 milliGRAM(s) IV Intermittent every 12 hours  dextrose 5%. 1000 milliLiter(s) (50 mL/Hr) IV Continuous <Continuous>  dextrose 50% Injectable 12.5 Gram(s) IV Push once  dextrose 50% Injectable 25 Gram(s) IV Push once  dextrose 50% Injectable 25 Gram(s) IV Push once  docusate sodium 100 milliGRAM(s) Oral three times a day  escitalopram 20 milliGRAM(s) Oral daily  furosemide   Injectable 60 milliGRAM(s) IV Push every 12 hours  insulin glargine Injectable (LANTUS) 32 Unit(s) SubCutaneous at bedtime  insulin lispro (HumaLOG) corrective regimen sliding scale   SubCutaneous every 6 hours  nystatin Powder 1 Application(s) Topical two times a day  pantoprazole    Tablet 40 milliGRAM(s) Oral before breakfast  predniSONE   Tablet 50 milliGRAM(s) Oral daily  propranolol 10 milliGRAM(s) Oral daily    MEDICATIONS  (PRN):  dextrose Gel 1 Dose(s) Oral once PRN Blood Glucose LESS THAN 70 milliGRAM(s)/deciliter  glucagon  Injectable 1 milliGRAM(s) IntraMuscular once PRN Glucose LESS THAN 70 milligrams/deciliter  polyethylene glycol 3350 17 Gram(s) Oral at bedtime PRN Constipation  senna 2 Tablet(s) Oral at bedtime PRN Constipation      Allergies  Levaquin (Short breath)  penicillin (Swelling)      Review of Systems:  General:  +generalized fatigue No fever or chills   CV:  No pain, palpitations, hypo/hypertension  Resp:  +cough (now resolved) +SOB  :  No pain, bleeding, incontinence, nocturia  Muscle:  No pain, +generalized weakness  Psych:  +generalized fatigue  Endocrine:  No polyuria, polydypsia, cold/heat intolerance  Heme:  +met breast CA  Skin:  no rash,+edema    Vital Signs Last 24 Hrs  T(C): 36.6 (20 Feb 2018 04:59), Max: 36.6 (20 Feb 2018 04:59)  T(F): 97.8 (20 Feb 2018 04:59), Max: 97.8 (20 Feb 2018 04:59)  HR: 82 (20 Feb 2018 04:59) (72 - 82)  BP: 125/75 (20 Feb 2018 04:59) (98/62 - 125/75)  BP(mean): --  RR: 16 (20 Feb 2018 04:59) (16 - 18)  SpO2: 94% (20 Feb 2018 04:59) (93% - 96%)    PHYSICAL EXAM:  Constitutional: elderly female lying in bed, appears comfortable, on O2 via nasal cannula;  IV Albumin infusing  Neck: No LAD, supple, no JVD  Respiratory: decreased BS at bases, no rales or wheeze  Chest wall: +rt. mastectomy, Lt. mediport  Cardiovascular: S1 and S2, RRR  Gastrointestinal: BS+, softly distended, NT no rebound, guarding or rigidity  Extremities: ++edema (+ARA stocking in place)  Neurological: drowsy but responsive no focal asymmetry  Skin: No rashes, anicteric    LABS:                        8.8    7.5   )-----------( 55       ( 20 Feb 2018 06:35 )             25.8     02-20    140  |  96  |  86<H>  ----------------------------<  142<H>  3.9   |  28  |  1.86<H>    Ca    9.8      20 Feb 2018 06:35    TPro  5.6<L>  /  Alb  3.8  /  TBili  7.3<H>  /  DBili  3.9<H>  /  AST  59<H>  /  ALT  34  /  AlkPhos  174<H>  02-20    Ammonia, Serum (02.20.18 @ 06:35)    Ammonia, Serum: 61 umol/L      RADIOLOGY & ADDITIONAL TESTS:    < from: CT Chest No Cont (02.19.18 @ 11:27) >    INTERPRETATION:  CLINICAL INFORMATION: Metastatic breast cancer status   post chest irradiation, rule out pneumonia    PROCEDURE:   CT of the Chest was performed without intravenous contrast.   Intravenous contrast: None.    Reconstructions were performed in axial thin, axial maximum intensity   projection, sagittal and coronal planes.    COMPARISON: CT abdomen pelvis 2/13/2018. Chest x-ray 2/18/2018.    FINDINGS:    LUNGS AND LARGE AIRWAYS: There is near complete occlusion of the left   lower lobe bronchus and partial collapse of the left lower lobe. There is   patchy opacity in the left lower lobe which may represent pneumonia..   Segmental atelectasis is noted within the bilateral upper lobes.   Nonspecific peripheral patchy and groundglass opacities in the right   upper lobe.  PLEURA: Moderate bilateral pleural effusions with associated lower lobe   passive atelectasis.  VESSELS: Atherosclerotic calcifications of the aorta and coronary   arteries.  HEART: Heart size is normal. Trace pericardial effusion.  MEDIASTINUM AND KENISHA: No lymphadenopathy.  CHEST WALL AND LOWER NECK: Left chest wall Mediport catheter distal tip   is in the SVC. Status post right mastectomy. Subcutaneous edema.  UPPER ABDOMEN: Cirrhotic liver with multiple hyper and hypodense lesions   is unchanged. Moderate volume perihepatic and perisplenic ascites.  BONES: Sclerotic osseous metastases, most predominantly in the T6 and T8   vertebral bodies, as well as in the sternum.    IMPRESSION:    Moderate bilateral pleural effusions with bilateral lower and upper lobe   atelectasis. Left lower lobe atelectasis may be secondary to a left lower   lobe bronchus mucous plug.    Patchy groundglass opacities in the right upper lobe and left lower lobe   may be secondary to infection.    Hepatic and blastic osseous metastases.

## 2018-02-20 NOTE — PROGRESS NOTE ADULT - SUBJECTIVE AND OBJECTIVE BOX
Patient is a 72y old  Female who presents with a chief complaint of shortness of breath (13 Feb 2018 21:46)      SUBJECTIVE / OVERNIGHT EVENTS: lethargic, arrousable. Family at bedside.   Review of Systems  chest pain no  palpitations no  sob no  nausea no  headache no    MEDICATIONS  (STANDING):  albumin human  5% IVPB 250 milliLiter(s) IV Intermittent every 6 hours  ALBUTerol/ipratropium for Nebulization 3 milliLiter(s) Nebulizer every 6 hours  buDESOnide   0.5 milliGRAM(s) Respule 0.5 milliGRAM(s) Inhalation every 12 hours  cefepime  IVPB 1000 milliGRAM(s) IV Intermittent every 12 hours  dextrose 5%. 1000 milliLiter(s) (50 mL/Hr) IV Continuous <Continuous>  dextrose 50% Injectable 12.5 Gram(s) IV Push once  dextrose 50% Injectable 25 Gram(s) IV Push once  dextrose 50% Injectable 25 Gram(s) IV Push once  docusate sodium 100 milliGRAM(s) Oral three times a day  escitalopram 20 milliGRAM(s) Oral daily  furosemide   Injectable 60 milliGRAM(s) IV Push every 12 hours  insulin glargine Injectable (LANTUS) 32 Unit(s) SubCutaneous at bedtime  insulin lispro (HumaLOG) corrective regimen sliding scale   SubCutaneous every 6 hours  nystatin Powder 1 Application(s) Topical two times a day  pantoprazole    Tablet 40 milliGRAM(s) Oral before breakfast  predniSONE   Tablet 50 milliGRAM(s) Oral daily  propranolol 10 milliGRAM(s) Oral daily    MEDICATIONS  (PRN):  dextrose Gel 1 Dose(s) Oral once PRN Blood Glucose LESS THAN 70 milliGRAM(s)/deciliter  glucagon  Injectable 1 milliGRAM(s) IntraMuscular once PRN Glucose LESS THAN 70 milligrams/deciliter  ondansetron Injectable 4 milliGRAM(s) IV Push every 6 hours PRN Nausea and/or Vomiting  polyethylene glycol 3350 17 Gram(s) Oral at bedtime PRN Constipation  senna 2 Tablet(s) Oral at bedtime PRN Constipation          PHYSICAL EXAM:  GENERAL: NAD, well-developed  HEAD:  Atraumatic, Normocephalic  EYES: EOMI, PERRLA, conjunctiva and sclera jaundiced  NECK: Supple, No JVD  CHEST/LUNG: Clear to auscultation bilaterally; No wheeze  HEART: Regular rate and rhythm; No murmurs, rubs, or gallops  ABDOMEN: Soft, Nontender, Nondistended; Bowel sounds present  EXTREMITIES:  2+ Peripheral Pulses, No clubbing, cyanosis,2+edema  PSYCH: AAOx3  NEUROLOGY: non-focal  SKIN: No rashes or lesions    LABS:                        8.8    7.5   )-----------( 55       ( 20 Feb 2018 06:35 )             25.8     02-20    140  |  96  |  86<H>  ----------------------------<  142<H>  3.9   |  28  |  1.86<H>    Ca    9.8      20 Feb 2018 06:35    TPro  5.6<L>  /  Alb  3.8  /  TBili  7.3<H>  /  DBili  3.9<H>  /  AST  59<H>  /  ALT  34  /  AlkPhos  174<H>  02-20              RADIOLOGY & ADDITIONAL TESTS:    Imaging Personally Reviewed:    Consultant(s) Notes Reviewed:      Care Discussed with Consultants/Other Providers:

## 2018-02-20 NOTE — PROGRESS NOTE ADULT - ASSESSMENT
71 yo female with PMH of breast cancer with mets to lung, liver, bones, s/p mastectomy, s/p depression, HTN, HLD, T2DM, presents with SOB and LE swelling found to have PIERRE. Renal following for PIERRE    labs reviewed

## 2018-02-20 NOTE — PROGRESS NOTE ADULT - ASSESSMENT
71 yo female with PMH of breast cancer with mets to lung, liver, bones, s/p mastectomy, depression, HTN, HLD, T2DM, presents with SOB and LE swelling found to have PIERRE and also with elevated LFTs, ascites as well as nodular liver contour (along with multiple liver mets). At this point, determination of whether underlying liver dysfunction is due to cirrhosis or if due to extensive liver mets would be more of an academic determination.    PLAN  -IV Albumin as per Renal  -Diuretics on hold given PIERRE  -doubt the confusion is related to Hepatic encephalopathy.  -Pt. is DNR  -Monitor clinically  -Supportive care  -Heme/Onc following      Ed Rose PA-C    [facility]  [phone] 73 yo female with PMH of breast cancer with mets to lung, liver, bones, s/p mastectomy, depression, HTN, HLD, T2DM, presents with SOB and LE swelling found to have PIERRE and also with elevated LFTs, ascites as well as nodular liver contour (along with multiple liver mets). At this point, determination of whether underlying liver dysfunction is due to cirrhosis or if due to extensive liver mets would be more of an academic determination.    PLAN  -IV Albumin as per Renal  -Diuretics on hold given PIERRE  -Pt. is DNR  -Monitor clinically  -Supportive care  -Heme/Onc following      Ed Rose PA-C    [facility]  [phone]

## 2018-02-21 DIAGNOSIS — J18.9 PNEUMONIA, UNSPECIFIED ORGANISM: ICD-10-CM

## 2018-02-21 LAB
ALBUMIN SERPL ELPH-MCNC: 4.4 G/DL — SIGNIFICANT CHANGE UP (ref 3.3–5)
ALP SERPL-CCNC: 153 U/L — HIGH (ref 40–120)
ALT FLD-CCNC: 33 U/L RC — SIGNIFICANT CHANGE UP (ref 10–45)
ANION GAP SERPL CALC-SCNC: 19 MMOL/L — HIGH (ref 5–17)
AST SERPL-CCNC: 71 U/L — HIGH (ref 10–40)
BILIRUB SERPL-MCNC: 7.7 MG/DL — HIGH (ref 0.2–1.2)
BUN SERPL-MCNC: 102 MG/DL — HIGH (ref 7–23)
CALCIUM SERPL-MCNC: 9.8 MG/DL — SIGNIFICANT CHANGE UP (ref 8.4–10.5)
CHLORIDE SERPL-SCNC: 96 MMOL/L — SIGNIFICANT CHANGE UP (ref 96–108)
CO2 SERPL-SCNC: 27 MMOL/L — SIGNIFICANT CHANGE UP (ref 22–31)
CREAT SERPL-MCNC: 2.17 MG/DL — HIGH (ref 0.5–1.3)
CULTURE RESULTS: SIGNIFICANT CHANGE UP
GALACTOMANNAN AG SERPL-ACNC: <0.5 INDEX — SIGNIFICANT CHANGE UP
GLUCOSE BLDC GLUCOMTR-MCNC: 158 MG/DL — HIGH (ref 70–99)
GLUCOSE BLDC GLUCOMTR-MCNC: 162 MG/DL — HIGH (ref 70–99)
GLUCOSE BLDC GLUCOMTR-MCNC: 163 MG/DL — HIGH (ref 70–99)
GLUCOSE BLDC GLUCOMTR-MCNC: 169 MG/DL — HIGH (ref 70–99)
GLUCOSE SERPL-MCNC: 167 MG/DL — HIGH (ref 70–99)
HCT VFR BLD CALC: 24.2 % — LOW (ref 34.5–45)
HGB BLD-MCNC: 8 G/DL — LOW (ref 11.5–15.5)
MCHC RBC-ENTMCNC: 33.3 GM/DL — SIGNIFICANT CHANGE UP (ref 32–36)
MCHC RBC-ENTMCNC: 37.9 PG — HIGH (ref 27–34)
MCV RBC AUTO: 114 FL — HIGH (ref 80–100)
PLATELET # BLD AUTO: 46 K/UL — LOW (ref 150–400)
POTASSIUM SERPL-MCNC: 4 MMOL/L — SIGNIFICANT CHANGE UP (ref 3.5–5.3)
POTASSIUM SERPL-SCNC: 4 MMOL/L — SIGNIFICANT CHANGE UP (ref 3.5–5.3)
PROT SERPL-MCNC: 6 G/DL — SIGNIFICANT CHANGE UP (ref 6–8.3)
RBC # BLD: 2.12 M/UL — LOW (ref 3.8–5.2)
RBC # FLD: 23.2 % — HIGH (ref 10.3–14.5)
SODIUM SERPL-SCNC: 142 MMOL/L — SIGNIFICANT CHANGE UP (ref 135–145)
SPECIMEN SOURCE: SIGNIFICANT CHANGE UP
WBC # BLD: 6.2 K/UL — SIGNIFICANT CHANGE UP (ref 3.8–10.5)
WBC # FLD AUTO: 6.2 K/UL — SIGNIFICANT CHANGE UP (ref 3.8–10.5)

## 2018-02-21 RX ORDER — SODIUM CHLORIDE 0.65 %
1 AEROSOL, SPRAY (ML) NASAL DAILY
Qty: 0 | Refills: 0 | Status: DISCONTINUED | OUTPATIENT
Start: 2018-02-21 | End: 2018-02-27

## 2018-02-21 RX ADMIN — Medication 3 MILLILITER(S): at 04:43

## 2018-02-21 RX ADMIN — Medication 1: at 18:46

## 2018-02-21 RX ADMIN — INSULIN GLARGINE 32 UNIT(S): 100 INJECTION, SOLUTION SUBCUTANEOUS at 23:47

## 2018-02-21 RX ADMIN — Medication 1: at 05:07

## 2018-02-21 RX ADMIN — NYSTATIN CREAM 1 APPLICATION(S): 100000 CREAM TOPICAL at 18:55

## 2018-02-21 RX ADMIN — Medication 100 MILLIGRAM(S): at 21:04

## 2018-02-21 RX ADMIN — Medication 1: at 11:48

## 2018-02-21 RX ADMIN — Medication 60 MILLIGRAM(S): at 04:44

## 2018-02-21 RX ADMIN — Medication 60 MILLIGRAM(S): at 18:45

## 2018-02-21 RX ADMIN — Medication 1: at 00:19

## 2018-02-21 RX ADMIN — Medication 50 MILLIGRAM(S): at 04:45

## 2018-02-21 RX ADMIN — CEFEPIME 100 MILLIGRAM(S): 1 INJECTION, POWDER, FOR SOLUTION INTRAMUSCULAR; INTRAVENOUS at 04:41

## 2018-02-21 RX ADMIN — CEFEPIME 100 MILLIGRAM(S): 1 INJECTION, POWDER, FOR SOLUTION INTRAMUSCULAR; INTRAVENOUS at 18:46

## 2018-02-21 RX ADMIN — ESCITALOPRAM OXALATE 20 MILLIGRAM(S): 10 TABLET, FILM COATED ORAL at 11:51

## 2018-02-21 RX ADMIN — Medication 3 MILLILITER(S): at 23:47

## 2018-02-21 RX ADMIN — Medication 3 MILLILITER(S): at 11:50

## 2018-02-21 RX ADMIN — Medication 0.5 MILLIGRAM(S): at 18:45

## 2018-02-21 RX ADMIN — Medication 125 MILLILITER(S): at 05:06

## 2018-02-21 RX ADMIN — Medication 1: at 23:47

## 2018-02-21 RX ADMIN — INSULIN GLARGINE 32 UNIT(S): 100 INJECTION, SOLUTION SUBCUTANEOUS at 00:19

## 2018-02-21 RX ADMIN — Medication 10 MILLIGRAM(S): at 04:45

## 2018-02-21 RX ADMIN — Medication 100 MILLIGRAM(S): at 11:51

## 2018-02-21 RX ADMIN — Medication 125 MILLILITER(S): at 00:16

## 2018-02-21 RX ADMIN — Medication 125 MILLILITER(S): at 11:49

## 2018-02-21 RX ADMIN — Medication 0.5 MILLIGRAM(S): at 04:44

## 2018-02-21 RX ADMIN — Medication 3 MILLILITER(S): at 00:20

## 2018-02-21 RX ADMIN — NYSTATIN CREAM 1 APPLICATION(S): 100000 CREAM TOPICAL at 04:44

## 2018-02-21 RX ADMIN — Medication 3 MILLILITER(S): at 18:45

## 2018-02-21 NOTE — DIETITIAN INITIAL EVALUATION ADULT. - DIET TYPE
supplement (specify)/NC Glucerna x 2 daily/as per swallow evaluation done on 2/18/dysphagia 1, pureed, nectar consistency fluid

## 2018-02-21 NOTE — PROGRESS NOTE ADULT - SUBJECTIVE AND OBJECTIVE BOX
CC: f/u for possible pneumonia    Patient remains weak, debilitated, poor po intake    REVIEW OF SYSTEMS:  limited    Antimicrobials Day # 3  cefepime  IVPB 1000 milliGRAM(s) IV Intermittent every 12 hours    Other Medications Reviewed    Vital Signs Last 24 Hrs  T(F): 97.3 (21 Feb 2018 13:31), Max: 98 (21 Feb 2018 09:10)  HR: 86 (21 Feb 2018 13:31) (74 - 91)  BP: 115/76 (21 Feb 2018 13:31) (104/70 - 120/83)  BP(mean): --  RR: 18 (21 Feb 2018 13:31) (16 - 18)  SpO2: 95% (21 Feb 2018 13:31) (94% - 95%)    PHYSICAL EXAM:  General: alert, no acute distress, jaundiced  Eyes:  icteric, no conjunctival injection, no discharge  Oropharynx: no lesions or injection 	  Neck: supple, without adenopathy  Mediport site clean  Lungs: diminished bases  Heart: regular rate and rhythm; no murmur, rubs or gallops  Abdomen: soft, nondistended, nontender, without mass or organomegaly  Skin: no lesions  Extremities: leg edema  Neurologic: somnolent, arouses easily, moves all extremities    LAB RESULTS:                        8.0    6.2   )-----------( 46       ( 21 Feb 2018 06:20 )             24.2  02-21    142  |  96  |  102<H>  ----------------------------<  167<H>  4.0   |  27  |  2.17<H>    Ca    9.8      21 Feb 2018 14:29    TPro  6.0  /  Alb  4.4  /  TBili  7.7<H>  /  DBili  x   /  AST  71<H>  /  ALT  33  /  AlkPhos  153<H>  02-21        MICROBIOLOGY:  RECENT CULTURES:  02-16 @ 19:03 .Body Fluid Peritoneal Fluid     No growth    Rapid Respiratory Viral Panel (02.20.18 @ 10:38)    Rapid RVP Result: [facility]      RADIOLOGY REVIEWED:  CT Chest No Cont (02.19.18 @ 11:27) >  Moderate bilateral pleural effusions with bilateral lower and upper lobe   atelectasis. Left lower lobe atelectasis may be secondary to a left lower   lobe bronchus mucous plug.    Patchy groundglass opacities in the right upper lobe and left lower lobe   may be secondary to infection.    Hepatic and blastic osseous metastases.

## 2018-02-21 NOTE — DIETITIAN INITIAL EVALUATION ADULT. - SOURCE
all available charts going back to start of all available charts for 3 years/other (specify)/family/significant other

## 2018-02-21 NOTE — PROGRESS NOTE ADULT - SUBJECTIVE AND OBJECTIVE BOX
Patient is a 72y old  Female who presents with a chief complaint of shortness of breath (13 Feb 2018 21:46)      SUBJECTIVE / OVERNIGHT EVENTS: Lethargic, arrousable.  at bedside.  Review of Systems  chest pain no  palpitations no  sob no  nausea no  headache no    MEDICATIONS  (STANDING):  ALBUTerol/ipratropium for Nebulization 3 milliLiter(s) Nebulizer every 6 hours  buDESOnide   0.5 milliGRAM(s) Respule 0.5 milliGRAM(s) Inhalation every 12 hours  cefepime  IVPB 1000 milliGRAM(s) IV Intermittent every 12 hours  dextrose 5%. 1000 milliLiter(s) (50 mL/Hr) IV Continuous <Continuous>  dextrose 50% Injectable 12.5 Gram(s) IV Push once  dextrose 50% Injectable 25 Gram(s) IV Push once  dextrose 50% Injectable 25 Gram(s) IV Push once  docusate sodium 100 milliGRAM(s) Oral three times a day  escitalopram 20 milliGRAM(s) Oral daily  furosemide   Injectable 60 milliGRAM(s) IV Push every 12 hours  insulin glargine Injectable (LANTUS) 32 Unit(s) SubCutaneous at bedtime  insulin lispro (HumaLOG) corrective regimen sliding scale   SubCutaneous every 6 hours  nystatin Powder 1 Application(s) Topical two times a day  pantoprazole    Tablet 40 milliGRAM(s) Oral before breakfast  predniSONE   Tablet 50 milliGRAM(s) Oral daily  propranolol 10 milliGRAM(s) Oral daily    MEDICATIONS  (PRN):  dextrose Gel 1 Dose(s) Oral once PRN Blood Glucose LESS THAN 70 milliGRAM(s)/deciliter  glucagon  Injectable 1 milliGRAM(s) IntraMuscular once PRN Glucose LESS THAN 70 milligrams/deciliter  ondansetron Injectable 4 milliGRAM(s) IV Push every 6 hours PRN Nausea and/or Vomiting  polyethylene glycol 3350 17 Gram(s) Oral at bedtime PRN Constipation  senna 2 Tablet(s) Oral at bedtime PRN Constipation          PHYSICAL EXAM:  GENERAL: NAD, lethargic , arrousable.  HEAD:  Atraumatic, Normocephalic  EYES: EOMI, PERRLA, conjunctiva and sclera jaundiced  NECK: Supple, No JVD  CHEST/LUNG: Clear to auscultation bilaterally; No wheeze  HEART: Regular rate and rhythm; No murmurs, rubs, or gallops  ABDOMEN: Soft, Nontender, Nondistended; Bowel sounds present  EXTREMITIES:  2+ Peripheral Pulses, No clubbing, cyanosis, 2+edema  PSYCH: AAOx3  NEUROLOGY: non-focal  SKIN: No rashes or lesions    LABS:                        8.0    6.2   )-----------( 46       ( 21 Feb 2018 06:20 )             24.2     02-21    142  |  96  |  102<H>  ----------------------------<  167<H>  4.0   |  27  |  2.17<H>    Ca    9.8      21 Feb 2018 14:29    TPro  6.0  /  Alb  4.4  /  TBili  7.7<H>  /  DBili  x   /  AST  71<H>  /  ALT  33  /  AlkPhos  153<H>  02-21              RADIOLOGY & ADDITIONAL TESTS:    Imaging Personally Reviewed:    Consultant(s) Notes Reviewed:      Care Discussed with Consultants/Other Providers:

## 2018-02-21 NOTE — PROGRESS NOTE ADULT - ASSESSMENT
73 yo female with advanced breast cancer, dyspnea, failure to thrive, and possible pneumonia.  She has extensive liver mets, ascites, and restrictive lung disease, multiple reasons for dyspnea and anorexia.  Patchy GG opacities on CT, possible infection, although difficult to be specific   Afebrile, normal WBC  Ascites fluid Cx negative  Liver and renal dysfx likely to remain most limiting; concern for hemolysis noted  For possible Soliris; although risk of infection will remain difficult to fully impact, with pt currently on Cefepime, no risk for Meningococcal dz; no absolute contraindication to its use at present    Plan:  Continue empiric Cefepime directed at possible pneumonia  No contraindication to Soliris, recognizing pt will remain at high risk for secondary infection, regardless  Few parameters to follow  Prognosis guarded- d/w  at bedside

## 2018-02-21 NOTE — PROGRESS NOTE ADULT - SUBJECTIVE AND OBJECTIVE BOX
CARDIOLOGY FOLLOW UP NOTE - DR. PALACIO    Subjective:  no inc sob  no cp    PHYSICAL EXAM:  T(C): 36.7 (02-21-18 @ 09:10), Max: 36.9 (02-20-18 @ 14:24)  HR: 91 (02-21-18 @ 09:10) (70 - 91)  BP: 107/69 (02-21-18 @ 09:10) (104/70 - 120/83)  RR: 18 (02-21-18 @ 09:10) (16 - 18)  SpO2: 94% (02-21-18 @ 09:10) (94% - 95%)  Wt(kg): --  I&O's Summary    20 Feb 2018 07:01  -  21 Feb 2018 07:00  --------------------------------------------------------  IN: 410 mL / OUT: 0 mL / NET: 410 mL        Appearance: Normal	  Cardiovascular: Normal S1 S2,RRR, No JVD, No murmurs  Respiratory: Lungs clear to auscultation	rhonchi bases  Gastrointestinal:  Soft, Non-tender, + BS	  Extremities: Normal range of motion, edema      MEDICATIONS  (STANDING):  albumin human  5% IVPB 250 milliLiter(s) IV Intermittent every 6 hours  ALBUTerol/ipratropium for Nebulization 3 milliLiter(s) Nebulizer every 6 hours  buDESOnide   0.5 milliGRAM(s) Respule 0.5 milliGRAM(s) Inhalation every 12 hours  cefepime  IVPB 1000 milliGRAM(s) IV Intermittent every 12 hours  dextrose 5%. 1000 milliLiter(s) (50 mL/Hr) IV Continuous <Continuous>  dextrose 50% Injectable 12.5 Gram(s) IV Push once  dextrose 50% Injectable 25 Gram(s) IV Push once  dextrose 50% Injectable 25 Gram(s) IV Push once  docusate sodium 100 milliGRAM(s) Oral three times a day  escitalopram 20 milliGRAM(s) Oral daily  furosemide   Injectable 60 milliGRAM(s) IV Push every 12 hours  insulin glargine Injectable (LANTUS) 32 Unit(s) SubCutaneous at bedtime  insulin lispro (HumaLOG) corrective regimen sliding scale   SubCutaneous every 6 hours  nystatin Powder 1 Application(s) Topical two times a day  pantoprazole    Tablet 40 milliGRAM(s) Oral before breakfast  predniSONE   Tablet 50 milliGRAM(s) Oral daily  propranolol 10 milliGRAM(s) Oral daily      TELEMETRY: 	    ECG:  	  RADIOLOGY:   DIAGNOSTIC TESTING:  [ ] Echocardiogram:  [ ] Catheterization:  [ ] Stress Test:    OTHER: 	    LABS:	 	    CARDIAC MARKERS:                                8.0    6.2   )-----------( 46       ( 21 Feb 2018 06:20 )             24.2     02-20    140  |  96  |  86<H>  ----------------------------<  142<H>  3.9   |  28  |  1.86<H>    Ca    9.8      20 Feb 2018 06:35    TPro  5.6<L>  /  Alb  3.8  /  TBili  7.3<H>  /  DBili  3.9<H>  /  AST  59<H>  /  ALT  34  /  AlkPhos  174<H>  02-20    proBNP:     Lipid Profile:   HgA1c:

## 2018-02-21 NOTE — PROGRESS NOTE ADULT - SUBJECTIVE AND OBJECTIVE BOX
Patient is a 72y old  Female who presents with a chief complaint of shortness of breath (13 Feb 2018 21:46)       Pt is seen and examined  pt is awake and lying in bed  pt with cough, sob; no fevers or chills,    REVIEW OF SYSTEMS:    CONSTITUTIONAL: No weakness, fevers or chills  EYES/ENT: No visual changes;  No vertigo or throat pain   NECK: No pain or stiffness  RESPIRATORY: No cough, wheezing, hemoptysis; No shortness of breath  CARDIOVASCULAR: No chest pain or palpitations  GASTROINTESTINAL: No abdominal or epigastric pain. No nausea, vomiting, or hematemesis; No diarrhea or constipation. No melena or hematochezia.  GENITOURINARY: No dysuria, frequency or hematuria  NEUROLOGICAL: No numbness or weakness  SKIN: No itching, burning, rashes, or lesions     MEDICATIONS  (STANDING):  albumin human  5% IVPB 250 milliLiter(s) IV Intermittent every 6 hours  ALBUTerol/ipratropium for Nebulization 3 milliLiter(s) Nebulizer every 6 hours  buDESOnide   0.5 milliGRAM(s) Respule 0.5 milliGRAM(s) Inhalation every 12 hours  cefepime  IVPB 1000 milliGRAM(s) IV Intermittent every 12 hours  dextrose 5%. 1000 milliLiter(s) (50 mL/Hr) IV Continuous <Continuous>  dextrose 50% Injectable 12.5 Gram(s) IV Push once  dextrose 50% Injectable 25 Gram(s) IV Push once  dextrose 50% Injectable 25 Gram(s) IV Push once  docusate sodium 100 milliGRAM(s) Oral three times a day  escitalopram 20 milliGRAM(s) Oral daily  furosemide   Injectable 60 milliGRAM(s) IV Push every 12 hours  insulin glargine Injectable (LANTUS) 32 Unit(s) SubCutaneous at bedtime  insulin lispro (HumaLOG) corrective regimen sliding scale   SubCutaneous every 6 hours  nystatin Powder 1 Application(s) Topical two times a day  pantoprazole    Tablet 40 milliGRAM(s) Oral before breakfast  predniSONE   Tablet 50 milliGRAM(s) Oral daily  propranolol 10 milliGRAM(s) Oral daily    MEDICATIONS  (PRN):  dextrose Gel 1 Dose(s) Oral once PRN Blood Glucose LESS THAN 70 milliGRAM(s)/deciliter  glucagon  Injectable 1 milliGRAM(s) IntraMuscular once PRN Glucose LESS THAN 70 milligrams/deciliter  ondansetron Injectable 4 milliGRAM(s) IV Push every 6 hours PRN Nausea and/or Vomiting  polyethylene glycol 3350 17 Gram(s) Oral at bedtime PRN Constipation  senna 2 Tablet(s) Oral at bedtime PRN Constipation      Allergies    Levaquin (Short breath)  penicillin (Swelling)    Intolerances        Vital Signs Last 24 Hrs  T(C): 36.6 (21 Feb 2018 04:59), Max: 36.9 (20 Feb 2018 14:24)  T(F): 97.8 (21 Feb 2018 04:59), Max: 98.4 (20 Feb 2018 14:24)  HR: 74 (21 Feb 2018 04:59) (70 - 75)  BP: 120/83 (21 Feb 2018 04:59) (102/66 - 120/83)  BP(mean): --  RR: 16 (21 Feb 2018 04:59) (16 - 18)  SpO2: 95% (21 Feb 2018 04:59) (94% - 95%)    PHYSICAL EXAM  General: adult in NAD  HEENT: clear oropharynx, anicteric sclera, pink conjunctiva  Neck: supple  CV: normal S1/S2 with no murmur rubs or gallops  Lungs: positive air movement b/l ant lungs,clear to auscultation, no wheezes, no rales  Abdomen: soft non-tender non-distended, no hepatosplenomegaly  Ext: no clubbing cyanosis or edema  Skin: no rashes and no petechiae  Neuro: alert and oriented X 4, no focal deficits    LABS:                          8.0    6.2   )-----------( 46       ( 21 Feb 2018 06:20 )             24.2       Mean Cell Volume : 114.0 fl  Mean Cell Hemoglobin : 37.9 pg  Mean Cell Hemoglobin Concentration : 33.3 gm/dL      Serial CBC's  02-21 @ 06:20  Hct-24.2 / Hgb-8.0 / Plat-46 / RBC-2.12 / WBC-6.2      Serial CBC's  02-20 @ 06:35  Hct-25.8 / Hgb-8.8 / Plat-55 / RBC-2.30 / WBC-7.5      Serial CBC's  02-19 @ 06:37  Hct-26.0 / Hgb-9.0 / Plat-51 / RBC-2.34 / WBC-8.4        Serial CBC's  02-18 @ 06:40  Hct-25.2 / Hgb-8.8 / Plat-49 / RBC-2.30 / WBC-7.4        02-20    140  |  96  |  86<H>  ----------------------------<  142<H>  3.9   |  28  |  1.86<H>    Ca    9.8      20 Feb 2018 06:35    TPro  5.6<L>  /  Alb  3.8  /  TBili  7.3<H>  /  DBili  3.9<H>  /  AST  59<H>  /  ALT  34  /  AlkPhos  174<H>  02-20          BLOOD SMEAR INTERPRETATION:       RADIOLOGY & ADDITIONAL STUDIES:    Assessment

## 2018-02-21 NOTE — DIETITIAN INITIAL EVALUATION ADULT. - NS AS NUTRI INTERV MEALS SNACK
pt is diabetic, if po intake improves consider adding CSTCHOSN to current DYS1NC, fluid deferred to MD in light of lasix./CHF/Carbohydrate - modified diet

## 2018-02-21 NOTE — PROGRESS NOTE ADULT - SUBJECTIVE AND OBJECTIVE BOX
Patient is a 72y old  Female who presented with a chief complaint of shortness of breath (13 Feb 2018 21:46)      INTERVAL HPI/OVERNIGHT EVENTS:  remains drowsy, arousable for short periods of time  +cough, diet was changed to dysphagia with thickened fluids  no fever  plans noted for new chemo regimen    MEDICATIONS  (STANDING):  albumin human  5% IVPB 250 milliLiter(s) IV Intermittent every 6 hours  ALBUTerol/ipratropium for Nebulization 3 milliLiter(s) Nebulizer every 6 hours  buDESOnide   0.5 milliGRAM(s) Respule 0.5 milliGRAM(s) Inhalation every 12 hours  cefepime  IVPB 1000 milliGRAM(s) IV Intermittent every 12 hours  dextrose 5%. 1000 milliLiter(s) (50 mL/Hr) IV Continuous <Continuous>  dextrose 50% Injectable 12.5 Gram(s) IV Push once  dextrose 50% Injectable 25 Gram(s) IV Push once  dextrose 50% Injectable 25 Gram(s) IV Push once  docusate sodium 100 milliGRAM(s) Oral three times a day  escitalopram 20 milliGRAM(s) Oral daily  furosemide   Injectable 60 milliGRAM(s) IV Push every 12 hours  insulin glargine Injectable (LANTUS) 32 Unit(s) SubCutaneous at bedtime  insulin lispro (HumaLOG) corrective regimen sliding scale   SubCutaneous every 6 hours  nystatin Powder 1 Application(s) Topical two times a day  pantoprazole    Tablet 40 milliGRAM(s) Oral before breakfast  predniSONE   Tablet 50 milliGRAM(s) Oral daily  propranolol 10 milliGRAM(s) Oral daily    MEDICATIONS  (PRN):  dextrose Gel 1 Dose(s) Oral once PRN Blood Glucose LESS THAN 70 milliGRAM(s)/deciliter  glucagon  Injectable 1 milliGRAM(s) IntraMuscular once PRN Glucose LESS THAN 70 milligrams/deciliter  ondansetron Injectable 4 milliGRAM(s) IV Push every 6 hours PRN Nausea and/or Vomiting  polyethylene glycol 3350 17 Gram(s) Oral at bedtime PRN Constipation  senna 2 Tablet(s) Oral at bedtime PRN Constipation      Allergies  Levaquin (Short breath)  penicillin (Swelling)    Review of Systems:  General:  +generalized fatigue No fever or chills   CV:  No pain, palpitations, hypo/hypertension  Resp:  +cough (now resolved) +SOB  :  No pain, bleeding, incontinence, nocturia  Muscle:  No pain, +generalized weakness  Psych:  +generalized fatigue  Endocrine:  No polyuria, polydypsia, cold/heat intolerance  Heme:  +met breast CA  Skin:  no rash,+edema    Vital Signs Last 24 Hrs  T(C): 36.7 (21 Feb 2018 09:10), Max: 36.9 (20 Feb 2018 14:24)  T(F): 98 (21 Feb 2018 09:10), Max: 98.4 (20 Feb 2018 14:24)  HR: 91 (21 Feb 2018 09:10) (70 - 91)  BP: 107/69 (21 Feb 2018 09:10) (102/66 - 120/83)  BP(mean): --  RR: 18 (21 Feb 2018 09:10) (16 - 18)  SpO2: 94% (21 Feb 2018 09:10) (94% - 95%)    PHYSICAL EXAM:  Constitutional: elderly female lying in bed, appears comfortable, on O2 via nasal cannula; spouse at bedside  Neck: No LAD, supple, no JVD  Respiratory: decreased BS at bases, no rales or wheeze  Chest wall: +rt. mastectomy, Lt. mediport  Cardiovascular: S1 and S2, RRR  Gastrointestinal: BS+, softly distended, NT no rebound, guarding or rigidity  Extremities: ++edema   Neurological: drowsy but responsive no focal asymmetry  Skin: No rashes, icteric    LABS:                        8.0    6.2   )-----------( 46       ( 21 Feb 2018 06:20 )             24.2     02-20    140  |  96  |  86<H>  ----------------------------<  142<H>  3.9   |  28  |  1.86<H>    Ca    9.8      20 Feb 2018 06:35    TPro  5.6<L>  /  Alb  3.8  /  TBili  7.3<H>  /  DBili  3.9<H>  /  AST  59<H>  /  ALT  34  /  AlkPhos  174<H>  02-20        Lactate Dehydrogenase, Serum (02.18.18 @ 10:12)    Lactate Dehydrogenase, Serum: 341 U/L    Haptoglobin, Serum (02.18.18 @ 11:34)    Haptoglobin, Serum: <20: Test Repeated mg/dL        RADIOLOGY & ADDITIONAL TESTS:

## 2018-02-21 NOTE — PROGRESS NOTE ADULT - ASSESSMENT
71 yo female with PMH of breast cancer with mets to lung, liver, bones, s/p mastectomy, depression, HTN, HLD, T2DM, presents with SOB and LE swelling found to have PIERRE and also with elevated LFTs, ascites as well as nodular liver contour (along with multiple liver mets). At this point, determination of whether underlying liver dysfunction is due to cirrhosis or if due to extensive liver mets would be more of an academic determination.    labs c/w hemolysis - ?meds related    PLAN  -Renal following  -Diuretics on hold given PIERRE  -Pt. is DNR  -Monitor clinically  -Supportive care  -Heme/Onc following, further plans for chemo    Ed Rose PA-C    [facility]  [phone]

## 2018-02-21 NOTE — PROGRESS NOTE ADULT - PROBLEM SELECTOR PLAN 1
Pre REnal vs ATN ( gemcitabine /TMA) vs HRS. no hydro on renal sono-reviewed.   re check u lytes and ua  off albumin now  c/w lasix 60mg IV bid given  volume overload  f/u BMP today  Onc input appreciate- awaiting Ecluzimab approval  will trend bmp

## 2018-02-21 NOTE — PROGRESS NOTE ADULT - SUBJECTIVE AND OBJECTIVE BOX
SUBJECTIVE: feels less sleepy.  denies confusion    Medications:  MEDICATIONS  (STANDING):  albumin human  5% IVPB 250 milliLiter(s) IV Intermittent every 6 hours  ALBUTerol/ipratropium for Nebulization 3 milliLiter(s) Nebulizer every 6 hours  buDESOnide   0.5 milliGRAM(s) Respule 0.5 milliGRAM(s) Inhalation every 12 hours  cefepime  IVPB 1000 milliGRAM(s) IV Intermittent every 12 hours  dextrose 5%. 1000 milliLiter(s) (50 mL/Hr) IV Continuous <Continuous>  dextrose 50% Injectable 12.5 Gram(s) IV Push once  dextrose 50% Injectable 25 Gram(s) IV Push once  dextrose 50% Injectable 25 Gram(s) IV Push once  docusate sodium 100 milliGRAM(s) Oral three times a day  escitalopram 20 milliGRAM(s) Oral daily  furosemide   Injectable 60 milliGRAM(s) IV Push every 12 hours  insulin glargine Injectable (LANTUS) 32 Unit(s) SubCutaneous at bedtime  insulin lispro (HumaLOG) corrective regimen sliding scale   SubCutaneous every 6 hours  nystatin Powder 1 Application(s) Topical two times a day  pantoprazole    Tablet 40 milliGRAM(s) Oral before breakfast  predniSONE   Tablet 50 milliGRAM(s) Oral daily  propranolol 10 milliGRAM(s) Oral daily    MEDICATIONS  (PRN):  dextrose Gel 1 Dose(s) Oral once PRN Blood Glucose LESS THAN 70 milliGRAM(s)/deciliter  glucagon  Injectable 1 milliGRAM(s) IntraMuscular once PRN Glucose LESS THAN 70 milligrams/deciliter  ondansetron Injectable 4 milliGRAM(s) IV Push every 6 hours PRN Nausea and/or Vomiting  polyethylene glycol 3350 17 Gram(s) Oral at bedtime PRN Constipation  senna 2 Tablet(s) Oral at bedtime PRN Constipation      Labs:  CBC Full  -  ( 21 Feb 2018 06:20 )  WBC Count : 6.2 K/uL  Hemoglobin : 8.0 g/dL  Hematocrit : 24.2 %  Platelet Count - Automated : 46 K/uL  Mean Cell Volume : 114.0 fl  Mean Cell Hemoglobin : 37.9 pg  Mean Cell Hemoglobin Concentration : 33.3 gm/dL  Auto Neutrophil # : x  Auto Lymphocyte # : x  Auto Monocyte # : x  Auto Eosinophil # : x  Auto Basophil # : x  Auto Neutrophil % : x  Auto Lymphocyte % : x  Auto Monocyte % : x  Auto Eosinophil % : x  Auto Basophil % : x    02-20    140  |  96  |  86<H>  ----------------------------<  142<H>  3.9   |  28  |  1.86<H>    Ca    9.8      20 Feb 2018 06:35    TPro  5.6<L>  /  Alb  3.8  /  TBili  7.3<H>  /  DBili  3.9<H>  /  AST  59<H>  /  ALT  34  /  AlkPhos  174<H>  02-20    CAPILLARY BLOOD GLUCOSE      POCT Blood Glucose.: 158 mg/dL (21 Feb 2018 04:57)  POCT Blood Glucose.: 198 mg/dL (20 Feb 2018 23:46)  POCT Blood Glucose.: 167 mg/dL (20 Feb 2018 18:10)  POCT Blood Glucose.: 164 mg/dL (20 Feb 2018 14:20)  POCT Blood Glucose.: 153 mg/dL (20 Feb 2018 11:46)    LIVER FUNCTIONS - ( 20 Feb 2018 06:35 )  Alb: 3.8 g/dL / Pro: 5.6 g/dL / ALK PHOS: 174 U/L / ALT: 34 U/L RC / AST: 59 U/L / GGT: x                 Vitals:  Vital Signs Last 24 Hrs  T(C): 36.7 (21 Feb 2018 09:10), Max: 36.9 (20 Feb 2018 14:24)  T(F): 98 (21 Feb 2018 09:10), Max: 98.4 (20 Feb 2018 14:24)  HR: 91 (21 Feb 2018 09:10) (70 - 91)  BP: 107/69 (21 Feb 2018 09:10) (102/66 - 120/83)  BP(mean): --  RR: 18 (21 Feb 2018 09:10) (16 - 18)  SpO2: 94% (21 Feb 2018 09:10) (94% - 95%)            NEUROLOGICAL EXAM:    Mental status: Awake, alert, and in no apparent distress. Oriented to person, place and time. Language function is normal. Recent memory, digit span and concentration were normal.     Cranial Nerves: Pupils were equal, round, reactive to light. Extraocular movements were intact. Visual field were full. Fundoscopic exam was deferred. Facial sensation was intact to light touch. There was no facial asymmetry. The palate was upgoing symmetrically and tongue was midline. Hearing acuity was intact to finger rub AU. Shoulder shrug was full bilaterally    Motor exam: Bulk and tone were normal. Strength was 5/5 in all four extremities. Fine finger movements were symmetric and normal. There was no pronator drift    Reflexes: 2+ in the bilateral upper extremities. 2+ in the bilateral lower extremities. Toes were downgoing bilaterally.     Sensation: Intact to light touch, temperature, vibration    Coordination: Finger-nose-finger and heel-to-shin was without dysmetria.     Gait: defered

## 2018-02-21 NOTE — PROGRESS NOTE ADULT - ASSESSMENT
72 year old female with breast cancer with mets to lung, liver, bones, s/p mastectomy, s/p depression, HTN, HLD, T2DM admitted with LE edema/ SOB     1. cv stable   2. SOB, likely secondary to pulm disease, met lung disease, volume overload  echo revealing normal lv sys fx   VQ scan revealing low probability of PE   cont iv lasix   monitor creat levels   pulm f/u     3. LE edema   likely secondary to hypoalbuminemia / extensive mets disease   on lasix and albumin IV   med/ onc / renal f/u      4 htn   bp stable  cont propranolol     5. elevated LFTS  gi f/u   s/p paracentesis  with replacement of albumin    6. PIERRE   renal f.u     dvt ppx

## 2018-02-21 NOTE — PROGRESS NOTE ADULT - ASSESSMENT
a/p - 71 yo F with hx of metatastic breat CA who complained of unsteady gait in setting of LE edema.  Exam nonfocal, she is not confused.  MRI done of brain on 2/12 no stroke or new mass, has old craniotomy.  Her labs initially notable for issues with kidney and liver function.  Overall doubt new neurological issue ongoing    Now with lethargy 2/18-2/19.  seems sob.  no myoclonus, asterixis  Now improved    - no need for more imaging.  exam nonfocal.  still doubt new neuro event  - cause for lethargy is metabolic.  ? dehydration.  ? r/o hypercarbia  - OOB to chair  - PT  - DVT prophlyaxis  - no further neuro w/u   d/w pt

## 2018-02-21 NOTE — DIETITIAN INITIAL EVALUATION ADULT. - PROBLEM SELECTOR PLAN 1
Patient with dyspnea and LE edema, possibly secondary to fluid overload  PE could not be ruled out, though less likely; not tachycardic and now saturating well on room air  CTA chest could not be done due to PIERRE  will get V/Q scan  no fever or cough, unlikely pneumonia though CT scan shows left base opacity ?pleural effusion;  leukocytosis likely due to steroid use   s/p IV vancomycin and IV cefepime; we will hold off on further abx at this time, unless patient develops fever or other symptoms suggestive of pneumonia  s/p lasix 40mg IV in ED  will start patient on lasix 40mg IV daily  check echocardiogram  monitor ins/outs and daily weight  check venous duplex to rule out DVT LE

## 2018-02-21 NOTE — PROGRESS NOTE ADULT - SUBJECTIVE AND OBJECTIVE BOX
Patient seen and examined  still has some shortness of breath   denies any chest pain    Levaquin (Short breath)  penicillin (Swelling)    Hospital Medications:   MEDICATIONS  (STANDING):  ALBUTerol/ipratropium for Nebulization 3 milliLiter(s) Nebulizer every 6 hours  buDESOnide   0.5 milliGRAM(s) Respule 0.5 milliGRAM(s) Inhalation every 12 hours  cefepime  IVPB 1000 milliGRAM(s) IV Intermittent every 12 hours  dextrose 5%. 1000 milliLiter(s) (50 mL/Hr) IV Continuous <Continuous>  dextrose 50% Injectable 12.5 Gram(s) IV Push once  dextrose 50% Injectable 25 Gram(s) IV Push once  dextrose 50% Injectable 25 Gram(s) IV Push once  docusate sodium 100 milliGRAM(s) Oral three times a day  escitalopram 20 milliGRAM(s) Oral daily  furosemide   Injectable 60 milliGRAM(s) IV Push every 12 hours  insulin glargine Injectable (LANTUS) 32 Unit(s) SubCutaneous at bedtime  insulin lispro (HumaLOG) corrective regimen sliding scale   SubCutaneous every 6 hours  nystatin Powder 1 Application(s) Topical two times a day  pantoprazole    Tablet 40 milliGRAM(s) Oral before breakfast  predniSONE   Tablet 50 milliGRAM(s) Oral daily  propranolol 10 milliGRAM(s) Oral daily      VITALS:  T(F): 97.3 (18 @ 13:31), Max: 98.4 (18 @ 14:24)  HR: 86 (18 @ 13:31)  BP: 115/76 (18 @ 13:31)  RR: 18 (18 @ 13:31)  SpO2: 95% (18 @ 13:31)  Wt(kg): --     @ 07:01  -   @ 07:00  --------------------------------------------------------  IN: 410 mL / OUT: 0 mL / NET: 410 mL     @ 07:01  -   @ 13:53  --------------------------------------------------------  IN: 490 mL / OUT: 0 mL / NET: 490 mL        PHYSICAL EXAM:  Constitutional: NAD  HEENT: anicteric sclera, oropharynx clear, MMM  Neck: No JVD  Respiratory: rt lung mild wheezing, dec bibasilar BS, no crepts appreciated  Cardiovascular: S1, S2, RRR  Gastrointestinal: BS+, soft, NT, obese  Extremities: 3+ pitting edema b/l  Neurological: A/O x 3, no focal deficits  Psychiatric: Normal mood, normal affect  : No CVA tenderness. No carty.   Skin: No rashes    LABS:      140  |  96  |  86<H>  ----------------------------<  142<H>  3.9   |  28  |  1.86<H>    Ca    9.8      2018 06:35    TPro  5.6<L>  /  Alb  3.8  /  TBili  7.3<H>  /  DBili  3.9<H>  /  AST  59<H>  /  ALT  34  /  AlkPhos  174<H>      Creatinine Trend: 1.86 <--, 1.82 <--, 1.94 <--, 2.01 <--, 1.99 <--, 2.45 <--                        8.0    6.2   )-----------( 46       ( 2018 06:20 )             24.2     Urine Studies:  Urinalysis Basic - ( 15 Feb 2018 16:13 )    Color: Karo / Appearance: Clear / S.016 / pH:   Gluc:  / Ketone: Negative  / Bili: Small / Urobili: 1.0 mg/dL   Blood:  / Protein: Negative mg/dL / Nitrite: Negative   Leuk Esterase: Negative / RBC: 5 /HPF / WBC 2 /HPF   Sq Epi:  / Non Sq Epi: 21 /HPF / Bacteria: Occasional      Sodium, Random Urine: <20 mmol/L ( @ 06:40)  Chloride, Random Urine: <35 mmol/L ( @ 06:40)  Potassium, Random Urine: 44 mmol/L ( @ 06:40)  Creatinine, Random Urine: 127 mg/dL ( @ 06:40)  Sodium, Random Urine: <20 mmol/L ( @ 10:26)  Chloride, Random Urine: <20 mmol/L ( @ 10:26)  Creatinine, Random Urine: 118 mg/dL ( @ 10:26)  Osmolality, Random Urine: 386 mos/kg ( @ 10:26)  Potassium, Random Urine: 49 mmol/L ( @ 10:26)  Potassium, Random Urine: 45 mmol/L ( @ 17:11)  Creatinine, Random Urine: 101 mg/dL (02-15 @ 16:13)  Protein/Creatinine Ratio Calculation: 0.1 Ratio (02-15 @ 16:13)  Chloride, Random Urine: <20 mmol/L (02-15 @ 16:13)  Sodium, Random Urine: <20 mmol/L (02-15 @ 16:13)  Osmolality, Random Urine: 420 mos/kg (02-15 @ 16:13)  Potassium, Random Urine: 36 mmol/L (02-15 @ 16:13)    RADIOLOGY & ADDITIONAL STUDIES:

## 2018-02-21 NOTE — PROGRESS NOTE ADULT - ASSESSMENT
· Assessment		  73 yo female with PMH of breast cancer with mets to lung, liver, bones, s/p mastectomy, s/p depression, HTN, HLD, T2DM, presents with SOB and LE swelling.  Pneumonia/ Mucus plug- start empiric antibiotics.  Pulmonary follow.  ID evaluation called Dr. Child.  cardiology follow Dr. Amaral       ·  Problem: CHF exacerbation.   monitor ins/outs and daily weight   cardiology follow Dr. Amaral        ·  Problem: PIERRE (acute kidney injury).  Plan: baseline creatinine likely 0.8  unclear etiology of PIERRE, ? metastases; CT scan shows hyperdense lesions  will monitor BMP  Nephrology follow Dr. Taylor noted.  supplement albumin/ Lasix      ·  Problem: Elevated LFTs.  Plan: likely due to metastasis  no biliary obstruction seen on CT scan  monitor LFTs in am    Problem: Type 2 diabetes mellitus.  sliding scale      ·  Problem: Depression.  Plan: escitalopram.     ·  Problem: Thrombocytopenia.  Plan: platelet was 89 on 2/1/18  ?chemo induced  will monitor for now.     Speech difficulty- discussed with Dr. Whitney. Patient had recent MRI brain normal. Neurology follow noted.      ·  Problem: Prophylactic measure.  Plan: no AC at this time, due to rapidly worsening thrombocytopenia  monitor platelet for now.    Metastatic breast cancer-  for possible treatment with Solinis.     Order for DNR/DNI placed.  Constipation- bowel regimen  Nausea control  Urinary retention- Peters  PT  d/w  QA    Rick Jacobo MD pager [phone]

## 2018-02-21 NOTE — DIETITIAN INITIAL EVALUATION ADULT. - OTHER INFO
seen for length of stay. pt with mets. she is DNR/DNI. at time of visit she consumed a couple of spoonfuls of mashed potatoes were observed to have been consumed . as per her  she has usually been eating the mashed potatoes, but not much else off the tray. she is receiving 2 x Glucerna shakes and is consuming ~1 and 1/2 daily.  refused need to increase. she was experiencing nausea which they are giving her medication for. admitted with pneumonia. last BM today as per flow sheet. NKFA

## 2018-02-22 LAB
ALBUMIN SERPL ELPH-MCNC: 4.3 G/DL — SIGNIFICANT CHANGE UP (ref 3.3–5)
ALP SERPL-CCNC: 160 U/L — HIGH (ref 40–120)
ALT FLD-CCNC: 40 U/L RC — SIGNIFICANT CHANGE UP (ref 10–45)
ANION GAP SERPL CALC-SCNC: 22 MMOL/L — HIGH (ref 5–17)
AST SERPL-CCNC: 87 U/L — HIGH (ref 10–40)
BILIRUB SERPL-MCNC: 8.2 MG/DL — HIGH (ref 0.2–1.2)
BUN SERPL-MCNC: 106 MG/DL — HIGH (ref 7–23)
CALCIUM SERPL-MCNC: 9.8 MG/DL — SIGNIFICANT CHANGE UP (ref 8.4–10.5)
CHLORIDE SERPL-SCNC: 96 MMOL/L — SIGNIFICANT CHANGE UP (ref 96–108)
CO2 SERPL-SCNC: 25 MMOL/L — SIGNIFICANT CHANGE UP (ref 22–31)
CREAT SERPL-MCNC: 2.42 MG/DL — HIGH (ref 0.5–1.3)
FUNGITELL: <31 PG/ML — SIGNIFICANT CHANGE UP (ref 0–59)
GLUCOSE BLDC GLUCOMTR-MCNC: 156 MG/DL — HIGH (ref 70–99)
GLUCOSE BLDC GLUCOMTR-MCNC: 158 MG/DL — HIGH (ref 70–99)
GLUCOSE BLDC GLUCOMTR-MCNC: 167 MG/DL — HIGH (ref 70–99)
GLUCOSE BLDC GLUCOMTR-MCNC: 172 MG/DL — HIGH (ref 70–99)
GLUCOSE BLDC GLUCOMTR-MCNC: 180 MG/DL — HIGH (ref 70–99)
GLUCOSE SERPL-MCNC: 161 MG/DL — HIGH (ref 70–99)
HCT VFR BLD CALC: 24.9 % — LOW (ref 34.5–45)
HGB BLD-MCNC: 8.4 G/DL — LOW (ref 11.5–15.5)
MCHC RBC-ENTMCNC: 33.6 GM/DL — SIGNIFICANT CHANGE UP (ref 32–36)
MCHC RBC-ENTMCNC: 38.7 PG — HIGH (ref 27–34)
MCV RBC AUTO: 115 FL — HIGH (ref 80–100)
NON-GYNECOLOGICAL CYTOLOGY STUDY: SIGNIFICANT CHANGE UP
PLATELET # BLD AUTO: 51 K/UL — LOW (ref 150–400)
POTASSIUM SERPL-MCNC: 4.1 MMOL/L — SIGNIFICANT CHANGE UP (ref 3.5–5.3)
POTASSIUM SERPL-SCNC: 4.1 MMOL/L — SIGNIFICANT CHANGE UP (ref 3.5–5.3)
PROT SERPL-MCNC: 5.6 G/DL — LOW (ref 6–8.3)
RBC # BLD: 2.16 M/UL — LOW (ref 3.8–5.2)
RBC # FLD: 24.4 % — HIGH (ref 10.3–14.5)
SODIUM SERPL-SCNC: 143 MMOL/L — SIGNIFICANT CHANGE UP (ref 135–145)
WBC # BLD: 6.8 K/UL — SIGNIFICANT CHANGE UP (ref 3.8–10.5)
WBC # FLD AUTO: 6.8 K/UL — SIGNIFICANT CHANGE UP (ref 3.8–10.5)

## 2018-02-22 RX ORDER — NEISSERIA MENINGITIDIS GROUP A CAPSULAR POLYSACCHARIDE ANTIGEN, NEISSERIA MENINGITIDIS GROUP C CAPSULAR POLYSACCHARIDE ANTIGEN, NEISSERIA MENINGITIDIS GROUP Y CAPSULAR POLYSACCHARIDE ANTIGEN AND NEISSERIA MENINGITIDIS GROUP W-135 CAPSULAR POLYSACCHARIDE ANTIGEN 50 MCG
0.5 KIT SUBCUTANEOUS ONCE
Qty: 0 | Refills: 0 | Status: DISCONTINUED | OUTPATIENT
Start: 2018-02-22 | End: 2018-02-22

## 2018-02-22 RX ORDER — NEISSERIA MENINGITIDIS GROUP A CAPSULAR POLYSACCHARIDE TETANUS TOXOID CONJUGATE ANTIGEN, NEISSERIA MENINGITIDIS GROUP C CAPSULAR POLYSACCHARIDE TETANUS TOXOID CONJUGATE ANTIGEN, NEISSERIA MENINGITIDIS GROUP Y CAPSULAR POLYSACCHARIDE TETANUS TOXOID CONJUGATE ANTIGEN, AND NEISSERIA MENINGITIDIS GROUP W-135 CAPSULAR POLYSACCHARIDE TETANUS TOXOID CONJUGATE ANTIGEN 10; 10; 10; 10 UG/.5ML; UG/.5ML; UG/.5ML; UG/.5ML
0.5 INJECTION, SOLUTION INTRAMUSCULAR ONCE
Qty: 0 | Refills: 0 | Status: COMPLETED | OUTPATIENT
Start: 2018-02-22 | End: 2018-02-22

## 2018-02-22 RX ADMIN — Medication 0.5 MILLIGRAM(S): at 17:35

## 2018-02-22 RX ADMIN — Medication 1: at 23:53

## 2018-02-22 RX ADMIN — NEISSERIA MENINGITIDIS GROUP A CAPSULAR POLYSACCHARIDE TETANUS TOXOID CONJUGATE ANTIGEN, NEISSERIA MENINGITIDIS GROUP C CAPSULAR POLYSACCHARIDE TETANUS TOXOID CONJUGATE ANTIGEN, NEISSERIA MENINGITIDIS GROUP Y CAPSULAR POLYSACCHARIDE TETANUS TOXOID CONJUGATE ANTIGEN, AND NEISSERIA MENINGITIDIS GROUP W-135 CAPSULAR POLYSACCHARIDE TETANUS TOXOID CONJUGATE ANTIGEN 0.5 MILLILITER(S): 10; 10; 10; 10 INJECTION, SOLUTION INTRAMUSCULAR at 17:35

## 2018-02-22 RX ADMIN — Medication 60 MILLIGRAM(S): at 06:02

## 2018-02-22 RX ADMIN — Medication 3 MILLILITER(S): at 17:35

## 2018-02-22 RX ADMIN — CEFEPIME 100 MILLIGRAM(S): 1 INJECTION, POWDER, FOR SOLUTION INTRAMUSCULAR; INTRAVENOUS at 17:37

## 2018-02-22 RX ADMIN — Medication 50 MILLIGRAM(S): at 05:24

## 2018-02-22 RX ADMIN — Medication 100 MILLIGRAM(S): at 11:58

## 2018-02-22 RX ADMIN — PANTOPRAZOLE SODIUM 40 MILLIGRAM(S): 20 TABLET, DELAYED RELEASE ORAL at 08:00

## 2018-02-22 RX ADMIN — Medication 1: at 11:57

## 2018-02-22 RX ADMIN — Medication 1: at 06:11

## 2018-02-22 RX ADMIN — Medication 1: at 19:09

## 2018-02-22 RX ADMIN — Medication 3 MILLILITER(S): at 11:58

## 2018-02-22 RX ADMIN — NYSTATIN CREAM 1 APPLICATION(S): 100000 CREAM TOPICAL at 17:40

## 2018-02-22 RX ADMIN — Medication 3 MILLILITER(S): at 23:54

## 2018-02-22 RX ADMIN — ESCITALOPRAM OXALATE 20 MILLIGRAM(S): 10 TABLET, FILM COATED ORAL at 11:57

## 2018-02-22 RX ADMIN — Medication 0.5 MILLIGRAM(S): at 05:24

## 2018-02-22 RX ADMIN — Medication 100 MILLIGRAM(S): at 22:21

## 2018-02-22 RX ADMIN — Medication 10 MILLIGRAM(S): at 09:41

## 2018-02-22 RX ADMIN — CEFEPIME 100 MILLIGRAM(S): 1 INJECTION, POWDER, FOR SOLUTION INTRAMUSCULAR; INTRAVENOUS at 05:24

## 2018-02-22 RX ADMIN — NYSTATIN CREAM 1 APPLICATION(S): 100000 CREAM TOPICAL at 06:03

## 2018-02-22 RX ADMIN — Medication 3 MILLILITER(S): at 05:23

## 2018-02-22 RX ADMIN — INSULIN GLARGINE 32 UNIT(S): 100 INJECTION, SOLUTION SUBCUTANEOUS at 22:21

## 2018-02-22 RX ADMIN — Medication 100 MILLIGRAM(S): at 05:24

## 2018-02-22 NOTE — PROGRESS NOTE ADULT - SUBJECTIVE AND OBJECTIVE BOX
Patient seen and examined  sob has improved  denies any chest pain    Levaquin (Short breath)  penicillin (Swelling)    Hospital Medications:   MEDICATIONS  (STANDING):  ALBUTerol/ipratropium for Nebulization 3 milliLiter(s) Nebulizer every 6 hours  buDESOnide   0.5 milliGRAM(s) Respule 0.5 milliGRAM(s) Inhalation every 12 hours  cefepime  IVPB 1000 milliGRAM(s) IV Intermittent every 12 hours  dextrose 5%. 1000 milliLiter(s) (50 mL/Hr) IV Continuous <Continuous>  dextrose 50% Injectable 12.5 Gram(s) IV Push once  dextrose 50% Injectable 25 Gram(s) IV Push once  dextrose 50% Injectable 25 Gram(s) IV Push once  docusate sodium 100 milliGRAM(s) Oral three times a day  escitalopram 20 milliGRAM(s) Oral daily  furosemide   Injectable 60 milliGRAM(s) IV Push every 12 hours  insulin glargine Injectable (LANTUS) 32 Unit(s) SubCutaneous at bedtime  insulin lispro (HumaLOG) corrective regimen sliding scale   SubCutaneous every 6 hours  meningococcal Vaccine 0.5 milliLiter(s) SubCutaneous once  nystatin Powder 1 Application(s) Topical two times a day  pantoprazole    Tablet 40 milliGRAM(s) Oral before breakfast  predniSONE   Tablet 50 milliGRAM(s) Oral daily  propranolol 10 milliGRAM(s) Oral daily      VITALS:  T(F): 97.6 (18 @ 08:34), Max: 98.6 (18 @ 18:33)  HR: 77 (18 @ 08:34)  BP: 110/976 (18 @ 08:34)  RR: 16 (18 @ 08:34)  SpO2: 98% (18 @ 08:34)  Wt(kg): --     @ 07:01  -   @ 07:00  --------------------------------------------------------  IN: 830 mL / OUT: 300 mL / NET: 530 mL      PHYSICAL EXAM:  Constitutional: NAD  HEENT: anicteric sclera, oropharynx clear, MMM  Neck: No JVD  Respiratory: rt lung mild wheezing, dec bibasilar BS, no crepts appreciated  Cardiovascular: S1, S2, RRR  Gastrointestinal: BS+, soft, NT, obese  Extremities: 4+ pitting edema b/l  Neurological: A/O x 3, no focal deficits  Psychiatric: Normal mood, normal affect  : No CVA tenderness. No carty.   Skin: No rashes:    LABS:      143  |  96  |  106<H>  ----------------------------<  161<H>  4.1   |  25  |  2.42<H>    Ca    9.8      2018 07:17    TPro  5.6<L>  /  Alb  4.3  /  TBili  8.2<H>  /  DBili      /  AST  87<H>  /  ALT  40  /  AlkPhos  160<H>      Creatinine Trend: 2.42 <--, 2.17 <--, 1.86 <--, 1.82 <--, 1.94 <--, 2.01 <--, 1.99 <--                        8.4    6.8   )-----------( 51       ( 2018 07:17 )             24.9     Urine Studies:  Urinalysis Basic - ( 15 Feb 2018 16:13 )    Color: Karo / Appearance: Clear / S.016 / pH:   Gluc:  / Ketone: Negative  / Bili: Small / Urobili: 1.0 mg/dL   Blood:  / Protein: Negative mg/dL / Nitrite: Negative   Leuk Esterase: Negative / RBC: 5 /HPF / WBC 2 /HPF   Sq Epi:  / Non Sq Epi: 21 /HPF / Bacteria: Occasional      Sodium, Random Urine: <20 mmol/L ( @ 06:40)  Chloride, Random Urine: <35 mmol/L ( @ 06:40)  Potassium, Random Urine: 44 mmol/L ( @ 06:40)  Creatinine, Random Urine: 127 mg/dL ( @ 06:40)  Sodium, Random Urine: <20 mmol/L ( @ 10:26)  Chloride, Random Urine: <20 mmol/L ( @ 10:26)  Creatinine, Random Urine: 118 mg/dL ( @ 10:26)  Osmolality, Random Urine: 386 mos/kg ( @ 10:26)  Potassium, Random Urine: 49 mmol/L ( @ 10:26)  Potassium, Random Urine: 45 mmol/L ( @ 17:11)  Creatinine, Random Urine: 101 mg/dL (02-15 @ 16:13)  Protein/Creatinine Ratio Calculation: 0.1 Ratio (02-15 @ :)  Chloride, Random Urine: <20 mmol/L (02-15 @ :13)  Sodium, Random Urine: <20 mmol/L (02-15 @ 16:13)  Osmolality, Random Urine: 420 mos/kg (02-15 @ 16:13)  Potassium, Random Urine: 36 mmol/L (02-15 @ 16:)    RADIOLOGY & ADDITIONAL STUDIES:

## 2018-02-22 NOTE — PROGRESS NOTE ADULT - ASSESSMENT
73 yo female with advanced breast cancer, dyspnea, failure to thrive, and possible pneumonia.  She has extensive liver mets, ascites, and restrictive lung disease, multiple reasons for dyspnea and anorexia  Patchy GG opacities on CT, possible infection, although difficult to be specific   Afebrile, normal WBC  Ascites fluid Cx negative  Liver and renal dysfx likely to remain most limiting; concern for hemolysis noted- Creat higher, Bili higher  For possible Soliris as outlined  Remains quite debilitated    Plan:  Continue empiric Cefepime directed at possible pneumonia  No contraindication to Soliris, recognizing pt may remain at high risk for secondary infectionFew parameters to follow  D/w daughter at bedside

## 2018-02-22 NOTE — PROGRESS NOTE ADULT - ASSESSMENT
72 year old female with breast cancer with mets to lung, liver, bones, s/p mastectomy, s/p depression, HTN, HLD, T2DM admitted with LE edema/ SOB     1. cv stable     2. SOB, likely secondary to pulm disease, met lung disease, volume overload  echo revealing normal lv sys fx   VQ scan revealing low probability of PE   on  iv lasix   monitor creat levels   pulm f/u     3. LE edema   likely secondary to hypoalbuminemia / extensive mets disease   s/p albumin   on lasix   med/ onc / renal f/u      4 htn   bp low- normal   if remains low would d/c propranolol to prevent further hypotension     5. elevated LFTS  gi f/u   s/p paracentesis  with replacement of albumin    6. PIERRE   renal f.u     dvt ppx

## 2018-02-22 NOTE — PROGRESS NOTE ADULT - SUBJECTIVE AND OBJECTIVE BOX
Patient is a 72y old  Female who presented with a chief complaint of shortness of breath (13 Feb 2018 21:46)      INTERVAL HPI/OVERNIGHT EVENTS:  remains lethargic  no new events    MEDICATIONS  (STANDING):  ALBUTerol/ipratropium for Nebulization 3 milliLiter(s) Nebulizer every 6 hours  buDESOnide   0.5 milliGRAM(s) Respule 0.5 milliGRAM(s) Inhalation every 12 hours  cefepime  IVPB 1000 milliGRAM(s) IV Intermittent every 12 hours  dextrose 5%. 1000 milliLiter(s) (50 mL/Hr) IV Continuous <Continuous>  dextrose 50% Injectable 12.5 Gram(s) IV Push once  dextrose 50% Injectable 25 Gram(s) IV Push once  dextrose 50% Injectable 25 Gram(s) IV Push once  docusate sodium 100 milliGRAM(s) Oral three times a day  escitalopram 20 milliGRAM(s) Oral daily  furosemide   Injectable 60 milliGRAM(s) IV Push every 12 hours  insulin glargine Injectable (LANTUS) 32 Unit(s) SubCutaneous at bedtime  insulin lispro (HumaLOG) corrective regimen sliding scale   SubCutaneous every 6 hours  meningococcal Vaccine 0.5 milliLiter(s) SubCutaneous once  nystatin Powder 1 Application(s) Topical two times a day  pantoprazole    Tablet 40 milliGRAM(s) Oral before breakfast  predniSONE   Tablet 50 milliGRAM(s) Oral daily  propranolol 10 milliGRAM(s) Oral daily    MEDICATIONS  (PRN):  dextrose Gel 1 Dose(s) Oral once PRN Blood Glucose LESS THAN 70 milliGRAM(s)/deciliter  glucagon  Injectable 1 milliGRAM(s) IntraMuscular once PRN Glucose LESS THAN 70 milligrams/deciliter  ondansetron Injectable 4 milliGRAM(s) IV Push every 6 hours PRN Nausea and/or Vomiting  polyethylene glycol 3350 17 Gram(s) Oral at bedtime PRN Constipation  senna 2 Tablet(s) Oral at bedtime PRN Constipation  sodium chloride 0.65% Nasal 1 Spray(s) Both Nostrils daily PRN Nasal Congestion      Allergies  Levaquin (Short breath)  penicillin (Swelling)    Review of Systems:  General:  +generalized fatigue No fever or chills   CV:  No pain, palpitations, hypo/hypertension  Resp:  +cough (now resolved) +SOB  :  No pain, bleeding, incontinence, nocturia  Muscle:  No pain, +generalized weakness  Psych:  +generalized fatigue  Endocrine:  No polyuria, polydypsia, cold/heat intolerance  Heme:  +met breast CA  Skin:  no rash,+edema    Vital Signs Last 24 Hrs  T(C): 36.4 (22 Feb 2018 08:34), Max: 37 (21 Feb 2018 18:33)  T(F): 97.6 (22 Feb 2018 08:34), Max: 98.6 (21 Feb 2018 18:33)  HR: 77 (22 Feb 2018 08:34) (75 - 91)  BP: 110/976 (22 Feb 2018 08:34) (94/60 - 115/76)  BP(mean): --  RR: 16 (22 Feb 2018 08:34) (16 - 18)  SpO2: 98% (22 Feb 2018 08:34) (94% - 98%)    PHYSICAL EXAM:  Constitutional: elderly female lying in bed, lethargic but arousable appears comfortable, on O2 via nasal cannula  Neck: No LAD, supple, no JVD  Respiratory: decreased BS at bases, no rales or wheeze  Chest wall: +rt. mastectomy, Lt. mediport  Cardiovascular: S1 and S2, RRR  Gastrointestinal: BS+, softly distended, NT no rebound, guarding or rigidity  Extremities: ++edema   Neurological: drowsy but responsive no focal asymmetry  Skin: No rashes, icteric    LABS:                        8.4    6.8   )-----------( 51       ( 22 Feb 2018 07:17 )             24.9     02-22    143  |  96  |  106<H>  ----------------------------<  161<H>  4.1   |  25  |  2.42<H>    Ca    9.8      22 Feb 2018 07:17    TPro  5.6<L>  /  Alb  4.3  /  TBili  8.2<H>  /  DBili  x   /  AST  87<H>  /  ALT  40  /  AlkPhos  160<H>  02-22          RADIOLOGY & ADDITIONAL TESTS: Patient is a 72y old  Female admitted with shortness of breath (13 Feb 2018 21:46)    INTERVAL HPI/OVERNIGHT EVENTS:  remains lethargic  no new events    MEDICATIONS  (STANDING):  ALBUTerol/ipratropium for Nebulization 3 milliLiter(s) Nebulizer every 6 hours  buDESOnide   0.5 milliGRAM(s) Respule 0.5 milliGRAM(s) Inhalation every 12 hours  cefepime  IVPB 1000 milliGRAM(s) IV Intermittent every 12 hours  dextrose 5%. 1000 milliLiter(s) (50 mL/Hr) IV Continuous <Continuous>  dextrose 50% Injectable 12.5 Gram(s) IV Push once  dextrose 50% Injectable 25 Gram(s) IV Push once  dextrose 50% Injectable 25 Gram(s) IV Push once  docusate sodium 100 milliGRAM(s) Oral three times a day  escitalopram 20 milliGRAM(s) Oral daily  furosemide   Injectable 60 milliGRAM(s) IV Push every 12 hours  insulin glargine Injectable (LANTUS) 32 Unit(s) SubCutaneous at bedtime  insulin lispro (HumaLOG) corrective regimen sliding scale   SubCutaneous every 6 hours  meningococcal Vaccine 0.5 milliLiter(s) SubCutaneous once  nystatin Powder 1 Application(s) Topical two times a day  pantoprazole    Tablet 40 milliGRAM(s) Oral before breakfast  predniSONE   Tablet 50 milliGRAM(s) Oral daily  propranolol 10 milliGRAM(s) Oral daily    MEDICATIONS  (PRN):  dextrose Gel 1 Dose(s) Oral once PRN Blood Glucose LESS THAN 70 milliGRAM(s)/deciliter  glucagon  Injectable 1 milliGRAM(s) IntraMuscular once PRN Glucose LESS THAN 70 milligrams/deciliter  ondansetron Injectable 4 milliGRAM(s) IV Push every 6 hours PRN Nausea and/or Vomiting  polyethylene glycol 3350 17 Gram(s) Oral at bedtime PRN Constipation  senna 2 Tablet(s) Oral at bedtime PRN Constipation  sodium chloride 0.65% Nasal 1 Spray(s) Both Nostrils daily PRN Nasal Congestion    Allergies  Levaquin (Short breath)  penicillin (Swelling)    Review of Systems:  General:  +generalized fatigue No fever or chills   CV:  No pain, palpitations, hypo/hypertension  Resp:  +cough (now resolved) +SOB  :  No pain, bleeding, incontinence, nocturia  Muscle:  No pain, +generalized weakness  Psych:  +generalized fatigue  Endocrine:  No polyuria, polydypsia, cold/heat intolerance  Heme:  +met breast CA  Skin:  no rash,+edema    Vital Signs Last 24 Hrs  T(C): 36.4 (22 Feb 2018 08:34), Max: 37 (21 Feb 2018 18:33)  T(F): 97.6 (22 Feb 2018 08:34), Max: 98.6 (21 Feb 2018 18:33)  HR: 77 (22 Feb 2018 08:34) (75 - 91)  BP: 110/976 (22 Feb 2018 08:34) (94/60 - 115/76)  BP(mean): --  RR: 16 (22 Feb 2018 08:34) (16 - 18)  SpO2: 98% (22 Feb 2018 08:34) (94% - 98%)    PHYSICAL EXAM:  Constitutional: elderly female lying in bed, lethargic but arousable appears comfortable, on O2 via nasal cannula  Neck: No LAD, supple, no JVD  Respiratory: decreased BS at bases, no rales or wheeze  Chest wall: +rt. mastectomy, Lt. mediport  Cardiovascular: S1 and S2, RRR  Gastrointestinal: BS+, softly distended, NT no rebound, guarding or rigidity  Extremities: ++edema   Neurological: drowsy but responsive no focal asymmetry  Skin: No rashes, icteric    LABS:                      8.4    6.8   )-----------( 51       ( 22 Feb 2018 07:17 )             24.9     02-22    143  |  96  |  106<H>  ----------------------------<  161<H>  4.1   |  25  |  2.42<H>    Ca    9.8      22 Feb 2018 07:17    TPro  5.6<L>  /  Alb  4.3  /  TBili  8.2<H>  /  DBili  x   /  AST  87<H>  /  ALT  40  /  AlkPhos  160<H>  02-22    RADIOLOGY & ADDITIONAL TESTS:

## 2018-02-22 NOTE — PROGRESS NOTE ADULT - ASSESSMENT
73 yo female with PMH of breast cancer with mets to lung, liver, bones, s/p mastectomy, depression, HTN, HLD, T2DM, presents with SOB and LE swelling found to have PIERRE and also with elevated LFTs, ascites as well as nodular liver contour (along with multiple liver mets). At this point, determination of whether underlying liver dysfunction is due to cirrhosis or if due to extensive liver mets would be more of an academic determination.    labs c/w hemolysis - ?meds related    PLAN  -Renal following  -Diuretics on hold given PIERRE  -Pt. is DNR  -Monitor clinically  -Supportive care  -Heme/Onc following, further plans for chemo    Ed Rose PA-C    [facility]  [phone] 71 yo female with PMH of breast cancer with mets to lung, liver, bones, s/p mastectomy, depression, HTN, HLD, T2DM, presented with SOB and LE swelling found to have PIERRE and also with elevated LFTs, ascites as well as nodular liver contour (along with multiple liver mets). At this point, determination of whether underlying liver dysfunction is due to cirrhosis or if due to extensive liver mets would be more of an academic determination.    labs c/w hemolysis - ?meds related    PLAN  -Renal following  -Diuretics on hold given PIERRE  -Pt. is DNR  -Monitor clinically  -Supportive care  -Heme/Onc following, further plans for chemo    Ed Rose PA-C    [facility]  [phone]

## 2018-02-22 NOTE — PROGRESS NOTE ADULT - SUBJECTIVE AND OBJECTIVE BOX
Patient is a 72y old  Female who presents with a chief complaint of shortness of breath (13 Feb 2018 21:46)      SUBJECTIVE / OVERNIGHT EVENTS: lethargic, arroussable.  Review of Systems  chest pain no  palpitations no  sob no  nausea no  headache no    MEDICATIONS  (STANDING):  ALBUTerol/ipratropium for Nebulization 3 milliLiter(s) Nebulizer every 6 hours  buDESOnide   0.5 milliGRAM(s) Respule 0.5 milliGRAM(s) Inhalation every 12 hours  cefepime  IVPB 1000 milliGRAM(s) IV Intermittent every 12 hours  dextrose 5%. 1000 milliLiter(s) (50 mL/Hr) IV Continuous <Continuous>  dextrose 50% Injectable 12.5 Gram(s) IV Push once  dextrose 50% Injectable 25 Gram(s) IV Push once  dextrose 50% Injectable 25 Gram(s) IV Push once  docusate sodium 100 milliGRAM(s) Oral three times a day  escitalopram 20 milliGRAM(s) Oral daily  furosemide   Injectable 60 milliGRAM(s) IV Push every 12 hours  insulin glargine Injectable (LANTUS) 32 Unit(s) SubCutaneous at bedtime  insulin lispro (HumaLOG) corrective regimen sliding scale   SubCutaneous every 6 hours  meningococcal Vaccine 0.5 milliLiter(s) SubCutaneous once  nystatin Powder 1 Application(s) Topical two times a day  pantoprazole    Tablet 40 milliGRAM(s) Oral before breakfast  predniSONE   Tablet 50 milliGRAM(s) Oral daily  propranolol 10 milliGRAM(s) Oral daily    MEDICATIONS  (PRN):  dextrose Gel 1 Dose(s) Oral once PRN Blood Glucose LESS THAN 70 milliGRAM(s)/deciliter  glucagon  Injectable 1 milliGRAM(s) IntraMuscular once PRN Glucose LESS THAN 70 milligrams/deciliter  ondansetron Injectable 4 milliGRAM(s) IV Push every 6 hours PRN Nausea and/or Vomiting  polyethylene glycol 3350 17 Gram(s) Oral at bedtime PRN Constipation  senna 2 Tablet(s) Oral at bedtime PRN Constipation  sodium chloride 0.65% Nasal 1 Spray(s) Both Nostrils daily PRN Nasal Congestion          PHYSICAL EXAM:  GENERAL: NAD, well-developed  HEAD:  Atraumatic, Normocephalic  EYES: EOMI, PERRLA, conjunctiva and sclera jaundiced  NECK: Supple, No JVD  CHEST/LUNG: Clear to auscultation bilaterally; No wheeze  HEART: Regular rate and rhythm; No murmurs, rubs, or gallops  ABDOMEN: Soft, Nontender, Nondistended; Bowel sounds present  EXTREMITIES:  2+ Peripheral Pulses, No clubbing, cyanosis, 2+ edema  PSYCH: AAOx3  NEUROLOGY: non-focal  SKIN: No rashes or lesions    LABS:                        8.4    6.8   )-----------( 51       ( 22 Feb 2018 07:17 )             24.9     02-22    143  |  96  |  106<H>  ----------------------------<  161<H>  4.1   |  25  |  2.42<H>    Ca    9.8      22 Feb 2018 07:17    TPro  5.6<L>  /  Alb  4.3  /  TBili  8.2<H>  /  DBili  x   /  AST  87<H>  /  ALT  40  /  AlkPhos  160<H>  02-22              RADIOLOGY & ADDITIONAL TESTS:    Imaging Personally Reviewed:    Consultant(s) Notes Reviewed:      Care Discussed with Consultants/Other Providers:

## 2018-02-22 NOTE — PROGRESS NOTE ADULT - ASSESSMENT
· Assessment		  71 yo female with PMH of breast cancer with mets to lung, liver, bones, s/p mastectomy, s/p depression, HTN, HLD, T2DM, presents with SOB and LE swelling.  Pneumonia/ Mucus plug- start empiric antibiotics.  Pulmonary follow.  ID follow         ·  Problem: CHF exacerbation.   monitor ins/outs and daily weight   cardiology follow Dr. Amaral        ·  Problem: PIERRE (acute kidney injury).  Plan: baseline creatinine likely 0.8  unclear etiology of PIERRE, ? metastases; CT scan shows hyperdense lesions  will monitor BMP  Nephrology follow Dr. Taylor noted.  s/p supplement albumin/ Lasix      ·  Problem: Elevated LFTs.  Plan: likely due to metastasis  no biliary obstruction seen on CT scan  monitor LFTs in am    Problem: Type 2 diabetes mellitus.  sliding scale      ·  Problem: Depression.  Plan: escitalopram.     ·  Problem: Thrombocytopenia.  Plan: platelet was 89 on 2/1/18  ?chemo induced  will monitor for now.       ·  Problem: Prophylactic measure.  Plan: no AC at this time, due to rapidly worsening thrombocytopenia  monitor platelet for now.    Metastatic breast cancer-  for possible treatment with Solinis.     Order for DNR/DNI placed.  Constipation- bowel regimen  Nausea control  Urinary retention- Peters  PT  d/w  QA    Rick Jacobo MD pager [phone]

## 2018-02-22 NOTE — PROGRESS NOTE ADULT - SUBJECTIVE AND OBJECTIVE BOX
CARDIOLOGY FOLLOW UP - Dr. Amaral    CC no acute events   no chest pain/ denies SOB   + cough     PHYSICAL EXAM:  T(C): 36.4 (02-22-18 @ 08:34), Max: 37 (02-21-18 @ 18:33)  HR: 77 (02-22-18 @ 08:34) (75 - 88)  BP: 110/976 (02-22-18 @ 08:34) (94/60 - 110/976)  RR: 16 (02-22-18 @ 08:34) (16 - 18)  SpO2: 98% (02-22-18 @ 08:34) (95% - 98%)  Wt(kg): --  I&O's Summary    21 Feb 2018 07:01  -  22 Feb 2018 07:00  --------------------------------------------------------  IN: 830 mL / OUT: 300 mL / NET: 530 mL        Appearance: NAD/ lethargic  	  Cardiovascular: Normal S1 S2,RRR  Respiratory: decrease BS on L > R   Gastrointestinal:  Soft, distended , Non-tender, + BS	  Extremities: anasarca        MEDICATIONS  (STANDING):  ALBUTerol/ipratropium for Nebulization 3 milliLiter(s) Nebulizer every 6 hours  buDESOnide   0.5 milliGRAM(s) Respule 0.5 milliGRAM(s) Inhalation every 12 hours  cefepime  IVPB 1000 milliGRAM(s) IV Intermittent every 12 hours  dextrose 5%. 1000 milliLiter(s) (50 mL/Hr) IV Continuous <Continuous>  dextrose 50% Injectable 12.5 Gram(s) IV Push once  dextrose 50% Injectable 25 Gram(s) IV Push once  dextrose 50% Injectable 25 Gram(s) IV Push once  docusate sodium 100 milliGRAM(s) Oral three times a day  escitalopram 20 milliGRAM(s) Oral daily  furosemide   Injectable 60 milliGRAM(s) IV Push every 12 hours  insulin glargine Injectable (LANTUS) 32 Unit(s) SubCutaneous at bedtime  insulin lispro (HumaLOG) corrective regimen sliding scale   SubCutaneous every 6 hours  meningococcal Vaccine 0.5 milliLiter(s) SubCutaneous once  nystatin Powder 1 Application(s) Topical two times a day  pantoprazole    Tablet 40 milliGRAM(s) Oral before breakfast  predniSONE   Tablet 50 milliGRAM(s) Oral daily  propranolol 10 milliGRAM(s) Oral daily      TELEMETRY: 	    ECG:  	  RADIOLOGY:   DIAGNOSTIC TESTING:  [ ] Echocardiogram:  [ ]  Catheterization:  [ ] Stress Test:    OTHER: 	    LABS:	 	                                8.4    6.8   )-----------( 51       ( 22 Feb 2018 07:17 )             24.9     02-22    143  |  96  |  106<H>  ----------------------------<  161<H>  4.1   |  25  |  2.42<H>    Ca    9.8      22 Feb 2018 07:17    TPro  5.6<L>  /  Alb  4.3  /  TBili  8.2<H>  /  DBili  x   /  AST  87<H>  /  ALT  40  /  AlkPhos  160<H>  02-22

## 2018-02-22 NOTE — PROGRESS NOTE ADULT - SUBJECTIVE AND OBJECTIVE BOX
Patient is a 72y old  Female who presents with a chief complaint of shortness of breath (13 Feb 2018 21:46)       Pt is seen and examined  pt is awake and lying in bed; lethargic, less arousable; no family at bedside;     REVIEW OF SYSTEMS:    CONSTITUTIONAL: (+) weakness, fevers or chills      MEDICATIONS  (STANDING):  ALBUTerol/ipratropium for Nebulization 3 milliLiter(s) Nebulizer every 6 hours  buDESOnide   0.5 milliGRAM(s) Respule 0.5 milliGRAM(s) Inhalation every 12 hours  cefepime  IVPB 1000 milliGRAM(s) IV Intermittent every 12 hours  dextrose 5%. 1000 milliLiter(s) (50 mL/Hr) IV Continuous <Continuous>  dextrose 50% Injectable 12.5 Gram(s) IV Push once  dextrose 50% Injectable 25 Gram(s) IV Push once  dextrose 50% Injectable 25 Gram(s) IV Push once  docusate sodium 100 milliGRAM(s) Oral three times a day  escitalopram 20 milliGRAM(s) Oral daily  furosemide   Injectable 60 milliGRAM(s) IV Push every 12 hours  insulin glargine Injectable (LANTUS) 32 Unit(s) SubCutaneous at bedtime  insulin lispro (HumaLOG) corrective regimen sliding scale   SubCutaneous every 6 hours  meningococcal Vaccine 0.5 milliLiter(s) SubCutaneous once  nystatin Powder 1 Application(s) Topical two times a day  pantoprazole    Tablet 40 milliGRAM(s) Oral before breakfast  predniSONE   Tablet 50 milliGRAM(s) Oral daily  propranolol 10 milliGRAM(s) Oral daily    MEDICATIONS  (PRN):  dextrose Gel 1 Dose(s) Oral once PRN Blood Glucose LESS THAN 70 milliGRAM(s)/deciliter  glucagon  Injectable 1 milliGRAM(s) IntraMuscular once PRN Glucose LESS THAN 70 milligrams/deciliter  ondansetron Injectable 4 milliGRAM(s) IV Push every 6 hours PRN Nausea and/or Vomiting  polyethylene glycol 3350 17 Gram(s) Oral at bedtime PRN Constipation  senna 2 Tablet(s) Oral at bedtime PRN Constipation  sodium chloride 0.65% Nasal 1 Spray(s) Both Nostrils daily PRN Nasal Congestion      Allergies    Levaquin (Short breath)  penicillin (Swelling)    Intolerances        Vital Signs Last 24 Hrs  T(C): 36.4 (22 Feb 2018 05:16), Max: 37 (21 Feb 2018 18:33)  T(F): 97.5 (22 Feb 2018 05:16), Max: 98.6 (21 Feb 2018 18:33)  HR: 76 (22 Feb 2018 05:58) (75 - 91)  BP: 105/67 (22 Feb 2018 05:58) (94/60 - 115/76)  BP(mean): --  RR: 18 (22 Feb 2018 05:16) (16 - 18)  SpO2: 97% (22 Feb 2018 05:16) (94% - 97%)    PHYSICAL EXAM  General: adult in NAD  HEENT: clear oropharynx, anicteric sclera, pink conjunctiva  Neck: supple  CV: normal S1/S2 with no murmur rubs or gallops  Lungs: positive air movement b/l ant lungs,clear to auscultation, no wheezes, no rales  Abdomen: soft non-tender non-distended, no hepatosplenomegaly  Ext: no clubbing cyanosis or edema  Skin: no rashes and no petechiae  Neuro: alert and oriented X 4, no focal deficits  LABS:                          8.4    6.8   )-----------( 51       ( 22 Feb 2018 07:17 )             24.9         Mean Cell Volume : 115.0 fl  Mean Cell Hemoglobin : 38.7 pg  Mean Cell Hemoglobin Concentration : 33.6 gm/dL    Serial CBC's  02-22 @ 07:17  Hct-24.9 / Hgb-8.4 / Plat-51 / RBC-2.16 / WBC-6.8      Serial CBC's  02-21 @ 06:20  Hct-24.2 / Hgb-8.0 / Plat-46 / RBC-2.12 / WBC-6.2    Serial CBC's  02-20 @ 06:35  Hct-25.8 / Hgb-8.8 / Plat-55 / RBC-2.30 / WBC-7.5      Serial CBC's  02-19 @ 06:37  Hct-26.0 / Hgb-9.0 / Plat-51 / RBC-2.34 / WBC-8.4      Serial CBC's  02-18 @ 10:12  Hct--- / Hgb--- / Plat--- / RBC-2.31 / WBC---            02-21    142  |  96  |  102<H>  ----------------------------<  167<H>  4.0   |  27  |  2.17<H>    Ca    9.8      21 Feb 2018 14:29    TPro  6.0  /  Alb  4.4  /  TBili  7.7<H>  /  DBili  x   /  AST  71<H>  /  ALT  33  /  AlkPhos  153<H>  02-21          RADIOLOGY & ADDITIONAL STUDIES:    Assessment

## 2018-02-22 NOTE — PROGRESS NOTE ADULT - SUBJECTIVE AND OBJECTIVE BOX
CC: f/u for possible pneumonia    Patient remains weak, debilitated, poor po intake    REVIEW OF SYSTEMS:  limited    Antimicrobials Day # 4  cefepime  IVPB 1000 milliGRAM(s) IV Intermittent every 12 hours    Other Medications Reviewed    Vital Signs Last 24 Hrs  T(F): 97.6 (22 Feb 2018 08:34), Max: 98.6 (21 Feb 2018 18:33)  HR: 77 (22 Feb 2018 08:34) (75 - 88)  BP: 110/76 (22 Feb 2018 08:34) (94/60 - 110/76)  BP(mean): --  RR: 16 (22 Feb 2018 08:34) (16 - 18)  SpO2: 98% (22 Feb 2018 08:34) (95% - 98%)    PHYSICAL EXAM:  General: alert, no acute distress, jaundiced  Eyes:  icteric, no conjunctival injection, no discharge  Oropharynx: no lesions or injection 	  Neck: supple, without adenopathy  L Mediport site clean  Lungs: diminished bases  Heart: regular rate and rhythm; no murmur, rubs or gallops  Abdomen: soft, nondistended, nontender, without mass or organomegaly  Skin: no lesions  Extremities: leg edema  Neurologic: somnolent, arouses easily, moves all extremities    LAB RESULTS:                        8.4    6.8   )-----------( 51       ( 22 Feb 2018 07:17 )             24.9   02-22    143  |  96  |  106<H>  ----------------------------<  161<H>  4.1   |  25  |  2.42<H>    Ca    9.8      22 Feb 2018 07:17    TPro  5.6<L>  /  Alb  4.3  /  TBili  8.2<H>  /  DBili  x   /  AST  87<H>  /  ALT  40  /  AlkPhos  160<H>  02-22        MICROBIOLOGY:  RECENT CULTURES:  02-16 @ 19:03 .Body Fluid Peritoneal Fluid     No growth    Rapid Respiratory Viral Panel (02.20.18 @ 10:38)    Rapid RVP Result: [facility]      RADIOLOGY REVIEWED:  CT Chest No Cont (02.19.18 @ 11:27) >  Moderate bilateral pleural effusions with bilateral lower and upper lobe   atelectasis. Left lower lobe atelectasis may be secondary to a left lower   lobe bronchus mucous plug.    Patchy groundglass opacities in the right upper lobe and left lower lobe   may be secondary to infection.    Hepatic and blastic osseous metastases.

## 2018-02-22 NOTE — PROGRESS NOTE ADULT - PROBLEM SELECTOR PLAN 1
Pre REnal vs ATN ( gemcitabine /TMA ) vs HRS.   santino input by Onc- aHUS  is of concern- awaiting Ecluzimab approval  off albumin- however cr continues to rise-  is on lasix 60mg iv bid for volume overload  unclear as to how much urineoutput  will advised to check bladder to r/o superimposed obstruction  for the time being given rise in cr, will dec lasix to 60mg QDAILY  trend bmp

## 2018-02-23 DIAGNOSIS — D59.3 HEMOLYTIC-UREMIC SYNDROME: ICD-10-CM

## 2018-02-23 LAB
ALBUMIN SERPL ELPH-MCNC: 4 G/DL — SIGNIFICANT CHANGE UP (ref 3.3–5)
ALP SERPL-CCNC: 160 U/L — HIGH (ref 40–120)
ALT FLD-CCNC: 48 U/L RC — HIGH (ref 10–45)
ANION GAP SERPL CALC-SCNC: 20 MMOL/L — HIGH (ref 5–17)
APPEARANCE UR: CLEAR — SIGNIFICANT CHANGE UP
AST SERPL-CCNC: 83 U/L — HIGH (ref 10–40)
BACTERIA # UR AUTO: ABNORMAL /HPF
BILIRUB SERPL-MCNC: 9.9 MG/DL — HIGH (ref 0.2–1.2)
BILIRUB UR-MCNC: NEGATIVE — SIGNIFICANT CHANGE UP
BUN SERPL-MCNC: 112 MG/DL — HIGH (ref 7–23)
CALCIUM SERPL-MCNC: 9.9 MG/DL — SIGNIFICANT CHANGE UP (ref 8.4–10.5)
CHLORIDE SERPL-SCNC: 100 MMOL/L — SIGNIFICANT CHANGE UP (ref 96–108)
CHLORIDE UR-SCNC: <35 MMOL/L — SIGNIFICANT CHANGE UP
CO2 SERPL-SCNC: 24 MMOL/L — SIGNIFICANT CHANGE UP (ref 22–31)
COLOR SPEC: YELLOW — SIGNIFICANT CHANGE UP
CREAT ?TM UR-MCNC: 54 MG/DL — SIGNIFICANT CHANGE UP
CREAT ?TM UR-MCNC: 58 MG/DL — SIGNIFICANT CHANGE UP
CREAT SERPL-MCNC: 2.43 MG/DL — HIGH (ref 0.5–1.3)
DIFF PNL FLD: ABNORMAL
EPI CELLS # UR: SIGNIFICANT CHANGE UP /HPF
GLUCOSE BLDC GLUCOMTR-MCNC: 101 MG/DL — HIGH (ref 70–99)
GLUCOSE BLDC GLUCOMTR-MCNC: 116 MG/DL — HIGH (ref 70–99)
GLUCOSE BLDC GLUCOMTR-MCNC: 72 MG/DL — SIGNIFICANT CHANGE UP (ref 70–99)
GLUCOSE BLDC GLUCOMTR-MCNC: 72 MG/DL — SIGNIFICANT CHANGE UP (ref 70–99)
GLUCOSE BLDC GLUCOMTR-MCNC: 77 MG/DL — SIGNIFICANT CHANGE UP (ref 70–99)
GLUCOSE BLDC GLUCOMTR-MCNC: 84 MG/DL — SIGNIFICANT CHANGE UP (ref 70–99)
GLUCOSE SERPL-MCNC: 119 MG/DL — HIGH (ref 70–99)
GLUCOSE UR QL: NEGATIVE — SIGNIFICANT CHANGE UP
HCT VFR BLD CALC: 25.4 % — LOW (ref 34.5–45)
HGB BLD-MCNC: 8.8 G/DL — LOW (ref 11.5–15.5)
HYALINE CASTS # UR AUTO: ABNORMAL
KETONES UR-MCNC: NEGATIVE — SIGNIFICANT CHANGE UP
LEUKOCYTE ESTERASE UR-ACNC: NEGATIVE — SIGNIFICANT CHANGE UP
MCHC RBC-ENTMCNC: 34.8 GM/DL — SIGNIFICANT CHANGE UP (ref 32–36)
MCHC RBC-ENTMCNC: 40 PG — HIGH (ref 27–34)
MCV RBC AUTO: 115 FL — HIGH (ref 80–100)
NITRITE UR-MCNC: NEGATIVE — SIGNIFICANT CHANGE UP
OSMOLALITY UR: 354 MOS/KG — SIGNIFICANT CHANGE UP (ref 300–900)
PH UR: 5.5 — SIGNIFICANT CHANGE UP (ref 5–8)
PLATELET # BLD AUTO: 48 K/UL — LOW (ref 150–400)
POTASSIUM SERPL-MCNC: 3.8 MMOL/L — SIGNIFICANT CHANGE UP (ref 3.5–5.3)
POTASSIUM SERPL-SCNC: 3.8 MMOL/L — SIGNIFICANT CHANGE UP (ref 3.5–5.3)
POTASSIUM UR-SCNC: 51 MMOL/L — SIGNIFICANT CHANGE UP
PROT ?TM UR-MCNC: 30 MG/DL — HIGH (ref 0–12)
PROT SERPL-MCNC: 5.6 G/DL — LOW (ref 6–8.3)
PROT UR-MCNC: SIGNIFICANT CHANGE UP
PROT/CREAT UR-RTO: 0.6 RATIO — HIGH (ref 0–0.2)
RBC # BLD: 2.2 M/UL — LOW (ref 3.8–5.2)
RBC # FLD: 24.3 % — HIGH (ref 10.3–14.5)
RBC CASTS # UR COMP ASSIST: SIGNIFICANT CHANGE UP /HPF (ref 0–2)
SODIUM SERPL-SCNC: 144 MMOL/L — SIGNIFICANT CHANGE UP (ref 135–145)
SODIUM UR-SCNC: <20 MMOL/L — SIGNIFICANT CHANGE UP
SP GR SPEC: 1.01 — SIGNIFICANT CHANGE UP (ref 1.01–1.02)
UROBILINOGEN FLD QL: NEGATIVE — SIGNIFICANT CHANGE UP
UUN UR-MCNC: 586 MG/DL — SIGNIFICANT CHANGE UP
WBC # BLD: 7.5 K/UL — SIGNIFICANT CHANGE UP (ref 3.8–10.5)
WBC # FLD AUTO: 7.5 K/UL — SIGNIFICANT CHANGE UP (ref 3.8–10.5)
WBC UR QL: SIGNIFICANT CHANGE UP /HPF (ref 0–5)

## 2018-02-23 RX ORDER — INSULIN GLARGINE 100 [IU]/ML
16 INJECTION, SOLUTION SUBCUTANEOUS ONCE
Qty: 0 | Refills: 0 | Status: COMPLETED | OUTPATIENT
Start: 2018-02-23 | End: 2018-02-23

## 2018-02-23 RX ORDER — ECULIZUMAB 300 MG/30ML
900 INJECTION, SOLUTION, CONCENTRATE INTRAVENOUS ONCE
Qty: 0 | Refills: 0 | Status: COMPLETED | OUTPATIENT
Start: 2018-02-23 | End: 2018-02-23

## 2018-02-23 RX ORDER — ACETAMINOPHEN 500 MG
650 TABLET ORAL ONCE
Qty: 0 | Refills: 0 | Status: COMPLETED | OUTPATIENT
Start: 2018-02-23 | End: 2018-02-23

## 2018-02-23 RX ORDER — INSULIN GLARGINE 100 [IU]/ML
16 INJECTION, SOLUTION SUBCUTANEOUS AT BEDTIME
Qty: 0 | Refills: 0 | Status: DISCONTINUED | OUTPATIENT
Start: 2018-02-23 | End: 2018-02-23

## 2018-02-23 RX ORDER — ONDANSETRON 8 MG/1
16 TABLET, FILM COATED ORAL ONCE
Qty: 0 | Refills: 0 | Status: COMPLETED | OUTPATIENT
Start: 2018-02-23 | End: 2018-02-23

## 2018-02-23 RX ORDER — DIPHENHYDRAMINE HCL 50 MG
25 CAPSULE ORAL ONCE
Qty: 0 | Refills: 0 | Status: COMPLETED | OUTPATIENT
Start: 2018-02-23 | End: 2018-02-23

## 2018-02-23 RX ADMIN — Medication 3 MILLILITER(S): at 23:00

## 2018-02-23 RX ADMIN — PANTOPRAZOLE SODIUM 40 MILLIGRAM(S): 20 TABLET, DELAYED RELEASE ORAL at 07:05

## 2018-02-23 RX ADMIN — Medication 650 MILLIGRAM(S): at 11:03

## 2018-02-23 RX ADMIN — Medication 3 MILLILITER(S): at 12:03

## 2018-02-23 RX ADMIN — NYSTATIN CREAM 1 APPLICATION(S): 100000 CREAM TOPICAL at 07:05

## 2018-02-23 RX ADMIN — CEFEPIME 100 MILLIGRAM(S): 1 INJECTION, POWDER, FOR SOLUTION INTRAMUSCULAR; INTRAVENOUS at 17:24

## 2018-02-23 RX ADMIN — Medication 25 MILLIGRAM(S): at 11:03

## 2018-02-23 RX ADMIN — Medication 0.5 MILLIGRAM(S): at 07:05

## 2018-02-23 RX ADMIN — Medication 10 MILLIGRAM(S): at 07:05

## 2018-02-23 RX ADMIN — NYSTATIN CREAM 1 APPLICATION(S): 100000 CREAM TOPICAL at 17:25

## 2018-02-23 RX ADMIN — Medication 60 MILLIGRAM(S): at 08:41

## 2018-02-23 RX ADMIN — INSULIN GLARGINE 16 UNIT(S): 100 INJECTION, SOLUTION SUBCUTANEOUS at 23:52

## 2018-02-23 RX ADMIN — Medication 0.5 MILLIGRAM(S): at 17:23

## 2018-02-23 RX ADMIN — Medication 100 MILLIGRAM(S): at 23:00

## 2018-02-23 RX ADMIN — ONDANSETRON 116 MILLIGRAM(S): 8 TABLET, FILM COATED ORAL at 11:03

## 2018-02-23 RX ADMIN — Medication 60 MILLIGRAM(S): at 17:23

## 2018-02-23 RX ADMIN — Medication 50 MILLIGRAM(S): at 07:06

## 2018-02-23 RX ADMIN — Medication 100 MILLIGRAM(S): at 07:05

## 2018-02-23 RX ADMIN — Medication 3 MILLILITER(S): at 17:23

## 2018-02-23 RX ADMIN — CEFEPIME 100 MILLIGRAM(S): 1 INJECTION, POWDER, FOR SOLUTION INTRAMUSCULAR; INTRAVENOUS at 07:05

## 2018-02-23 RX ADMIN — ECULIZUMAB 308.57 MILLIGRAM(S): 300 INJECTION, SOLUTION, CONCENTRATE INTRAVENOUS at 11:35

## 2018-02-23 RX ADMIN — Medication 3 MILLILITER(S): at 07:05

## 2018-02-23 NOTE — PROGRESS NOTE ADULT - ASSESSMENT
failure to thrive    A) respiratory dysfunction    1) moderate bilateral pleural effusions with atelectasis due to translocation of ascitic fluid across the diaphragm versus accumulation of pleural fluid due to hypoalbuminemia  2) +/- pneumonia  3) restrictive lung disease due to obesity, radiation pneumonitis, decreased diaphragmatic excursion due to ascites and respiratory muscle weakness  4) mucous plugging with near complete occlusion of the left lower lobe bronchus and partial collapse of the left lower lobe    B) cognitive dysfunction with confusion, slurred speech and memory loss - chemo related - r/o hepatic encephalopathy    C) extensive metastatic breast cancer to the bones, liver and lung s/p mastectomy, craniotomy and chest irradiation -  decreased blood flow through the liver with ascites and lower extremity swelling -     D) HUS    E) PIERRE    PLAN/RECOMMENDATIONS    oxygen supplementation to keep saturation greater than 92%  empiric cefepime  albuterol/atrovent/pulmicort nebs  prednisone 50mg daily  patient is not a candidate for bronchoscopy given poor overall prognosis  on diuretics  bilateral SCDs  glucose control  bowel regimen  renal/ID/oncology follow-up    Will follow with you. Plan of care discussed with the  at bedside    Sylvester Babin MD, [facility] - [phone]  Pulmonary Medicine

## 2018-02-23 NOTE — PROGRESS NOTE ADULT - SUBJECTIVE AND OBJECTIVE BOX
CC: f/u for pneumonia    Patient reports feels a bit better, very weak, cough , sob    REVIEW OF SYSTEMS:  All other review of systems negative (Comprehensive ROS)    Antimicrobials Day #  :5  cefepime  IVPB 1000 milliGRAM(s) IV Intermittent every 12 hours    Other Medications Reviewed    T(F): 97.7 (02-23-18 @ 05:35), Max: 97.7 (02-23-18 @ 05:35)  HR: 74 (02-23-18 @ 05:35)  BP: 108/61 (02-23-18 @ 05:35)  RR: 18 (02-23-18 @ 05:35)  SpO2: 95% (02-23-18 @ 05:35)  Wt(kg): --    PHYSICAL EXAM:  General: alert, chronically ill appearing  Eyes:  icteric, no conjunctival injection, no discharge  Oropharynx: no lesions or injection 	  Neck: supple, without adenopathy  Lungs: rales to auscultation  Heart: regular rate and rhythm; no murmur, rubs or gallops  Abdomen: soft, distended, nontender, without mass or organomegaly  Skin: no lesions  Extremities: no clubbing, cyanosis,. legs edema  Neurologic: awake, oriented, moves all extremities    LAB RESULTS:                        8.8    7.5   )-----------( 48       ( 23 Feb 2018 07:13 )             25.4     02-23    144  |  100  |  112<H>  ----------------------------<  119<H>  3.8   |  24  |  2.43<H>    Ca    9.9      23 Feb 2018 07:13    TPro  5.6<L>  /  Alb  4.0  /  TBili  9.9<H>  /  DBili  x   /  AST  83<H>  /  ALT  48<H>  /  AlkPhos  160<H>  02-23    LIVER FUNCTIONS - ( 23 Feb 2018 07:13 )  Alb: 4.0 g/dL / Pro: 5.6 g/dL / ALK PHOS: 160 U/L / ALT: 48 U/L RC / AST: 83 U/L / GGT: x             MICROBIOLOGY:  RECENT CULTURES:    rvp neg, ascites cx neg  RADIOLOGY REVIEWED:    < from: CT Chest No Cont (02.19.18 @ 11:27) >  EXAM:  CT CHEST                            PROCEDURE DATE:  02/19/2018            INTERPRETATION:  CLINICAL INFORMATION: Metastatic breast cancer status   post chest irradiation, rule out pneumonia    PROCEDURE:   CT of the Chest was performed without intravenous contrast.   Intravenous contrast: None.    Reconstructions were performed in axial thin, axial maximum intensity   projection, sagittal and coronal planes.    COMPARISON: CT abdomen pelvis 2/13/2018. Chest x-ray 2/18/2018.    FINDINGS:    LUNGS AND LARGE AIRWAYS: There is near complete occlusion of the left   lower lobe bronchus and partial collapse of the left lower lobe. There is   patchy opacity in the left lower lobe which may represent pneumonia..   Segmental atelectasis is noted within the bilateral upper lobes.   Nonspecific peripheral patchy and groundglass opacities in the right   upper lobe.  PLEURA: Moderate bilateral pleural effusions with associated lower lobe   passive atelectasis.  VESSELS: Atherosclerotic calcifications of the aorta and coronary   arteries.  HEART: Heart size is normal. Trace pericardial effusion.  MEDIASTINUM AND KENISHA: No lymphadenopathy.  CHEST WALL AND LOWER NECK: Left chest wall Mediport catheter distal tip   is in the SVC. Status post right mastectomy. Subcutaneous edema.  UPPER ABDOMEN: Cirrhotic liver with multiple hyper and hypodense lesions   is unchanged. Moderate volume perihepatic and perisplenic ascites.  BONES: Sclerotic osseous metastases, most predominantly in the T6 and T8   vertebral bodies, as well as in the sternum.    IMPRESSION:    Moderate bilateral pleural effusions with bilateral lower and upper lobe   atelectasis. Left lower lobe atelectasis may be secondary to a left lower   lobe bronchus mucous plug.    Patchy groundglass opacities in the right upper lobe and left lower lobe   may be secondary to infection.    Hepatic and blastic osseous metastases.      < end of copied text >      Assessment:  Patient with metastatic breast cancer, patient with pulmonary infiltrates with possible pneumonia so on cefepime, patient with hus from gemzar so s/p meningococcal vaccine and soliris  Plan:  Anticipate 2 more days of cefepime for possible pneumonia

## 2018-02-23 NOTE — PROGRESS NOTE ADULT - PROBLEM SELECTOR PLAN 4
- to start soliris today - weekly x 4 weeks and if response then q 2 wks  - s/p meningococcus vaccine  - follow daily cbc, ldh, retic, cmp for 2-3 d

## 2018-02-23 NOTE — PROGRESS NOTE ADULT - ASSESSMENT
71 yo female with PMH of breast cancer with mets to lung, liver, bones, s/p mastectomy, depression, HTN, HLD, T2DM, presented with SOB and LE swelling found to have PIERRE and also with elevated LFTs, ascites as well as nodular liver contour (along with multiple liver mets). At this point, determination of whether underlying liver dysfunction is due to cirrhosis or if due to extensive liver mets would be more of an academic determination.    labs c/w hemolysis - ?meds related  HUS - planned chemo    PLAN  -Renal following  -Diuretics on hold given PIERRE  -Pt. is DNR, overall prognosis poor  -Monitor clinically, trend LFTs  -Supportive care  -Heme/Onc following, further plans for chemo. Discussed with Hem/Onc attending    Please call over weekend prn with GI concerns    Ed Rose PA-C    [facility]  [phone]

## 2018-02-23 NOTE — PROGRESS NOTE ADULT - SUBJECTIVE AND OBJECTIVE BOX
Patient is a 72y old  Female who presents with a chief complaint of shortness of breath (13 Feb 2018 21:46)       Pt is seen and examined  pt is awake and lying in bed; lethargic      REVIEW OF SYSTEMS:    CONSTITUTIONAL: No weakness, fevers or chills  EYES/ENT: No visual changes;  No vertigo or throat pain   NECK: No pain or stiffness  RESPIRATORY: No cough, wheezing, hemoptysis; No shortness of breath  CARDIOVASCULAR: No chest pain or palpitations  GASTROINTESTINAL: No abdominal or epigastric pain. No nausea, vomiting, or hematemesis; No diarrhea or constipation. No melena or hematochezia.  GENITOURINARY: No dysuria, frequency or hematuria  NEUROLOGICAL: No numbness or weakness  SKIN: No itching, burning, rashes, or lesions     MEDICATIONS  (STANDING):  ALBUTerol/ipratropium for Nebulization 3 milliLiter(s) Nebulizer every 6 hours  buDESOnide   0.5 milliGRAM(s) Respule 0.5 milliGRAM(s) Inhalation every 12 hours  cefepime  IVPB 1000 milliGRAM(s) IV Intermittent every 12 hours  dextrose 5%. 1000 milliLiter(s) (50 mL/Hr) IV Continuous <Continuous>  dextrose 50% Injectable 12.5 Gram(s) IV Push once  dextrose 50% Injectable 25 Gram(s) IV Push once  dextrose 50% Injectable 25 Gram(s) IV Push once  docusate sodium 100 milliGRAM(s) Oral three times a day  escitalopram 20 milliGRAM(s) Oral daily  furosemide   Injectable 60 milliGRAM(s) IV Push every 12 hours  insulin glargine Injectable (LANTUS) 32 Unit(s) SubCutaneous at bedtime  insulin lispro (HumaLOG) corrective regimen sliding scale   SubCutaneous every 6 hours  nystatin Powder 1 Application(s) Topical two times a day  pantoprazole    Tablet 40 milliGRAM(s) Oral before breakfast  predniSONE   Tablet 50 milliGRAM(s) Oral daily  propranolol 10 milliGRAM(s) Oral daily    MEDICATIONS  (PRN):  dextrose Gel 1 Dose(s) Oral once PRN Blood Glucose LESS THAN 70 milliGRAM(s)/deciliter  glucagon  Injectable 1 milliGRAM(s) IntraMuscular once PRN Glucose LESS THAN 70 milligrams/deciliter  ondansetron Injectable 4 milliGRAM(s) IV Push every 6 hours PRN Nausea and/or Vomiting  polyethylene glycol 3350 17 Gram(s) Oral at bedtime PRN Constipation  senna 2 Tablet(s) Oral at bedtime PRN Constipation  sodium chloride 0.65% Nasal 1 Spray(s) Both Nostrils daily PRN Nasal Congestion      Allergies    Levaquin (Short breath)  penicillin (Swelling)    Intolerances        Vital Signs Last 24 Hrs  T(C): 36.5 (23 Feb 2018 05:35), Max: 36.5 (23 Feb 2018 05:35)  T(F): 97.7 (23 Feb 2018 05:35), Max: 97.7 (23 Feb 2018 05:35)  HR: 74 (23 Feb 2018 05:35) (74 - 82)  BP: 108/61 (23 Feb 2018 05:35) (101/69 - 110/76)  BP(mean): --  RR: 18 (23 Feb 2018 05:35) (16 - 18)  SpO2: 95% (23 Feb 2018 05:35) (95% - 99%)    PHYSICAL EXAM  General: adult in NAD  HEENT: clear oropharynx, anicteric sclera, pink conjunctiva  Neck: supple  CV: normal S1/S2 with no murmur rubs or gallops  Lungs: positive air movement b/l ant lungs,clear to auscultation, no wheezes, no rales  Abdomen: soft non-tender non-distended, no hepatosplenomegaly  Ext: no clubbing cyanosis or edema  Skin: no rashes and no petechiae  Neuro: alert and oriented X 4, no focal deficits  LABS:                          8.8    7.5   )-----------( 48       ( 23 Feb 2018 07:13 )             25.4         Mean Cell Volume : 115.0 fl  Mean Cell Hemoglobin : 40.0 pg  Mean Cell Hemoglobin Concentration : 34.8 gm/dL      Serial CBC's  02-23 @ 07:13  Hct-25.4 / Hgb-8.8 / Plat-48 / RBC-2.20 / WBC-7.5      Serial CBC's  02-22 @ 07:17  Hct-24.9 / Hgb-8.4 / Plat-51 / RBC-2.16 / WBC-6.8      Serial CBC's  02-21 @ 06:20  Hct-24.2 / Hgb-8.0 / Plat-46 / RBC-2.12 / WBC-6.2      Serial CBC's  02-20 @ 06:35  Hct-25.8 / Hgb-8.8 / Plat-55 / RBC-2.30 / WBC-7.5            02-22    143  |  96  |  106<H>  ----------------------------<  161<H>  4.1   |  25  |  2.42<H>    Ca    9.8      22 Feb 2018 07:17    TPro  5.6<L>  /  Alb  4.3  /  TBili  8.2<H>  /  DBili  x   /  AST  87<H>  /  ALT  40  /  AlkPhos  160<H>  02-22            RADIOLOGY & ADDITIONAL STUDIES:    Assessment

## 2018-02-23 NOTE — PROGRESS NOTE ADULT - SUBJECTIVE AND OBJECTIVE BOX
Patient is a 72y old  Female who presented with a chief complaint of shortness of breath (13 Feb 2018 21:46)      INTERVAL HPI/OVERNIGHT EVENTS:  no new events overnight, remains lethargic but arousable    MEDICATIONS  (STANDING):  acetaminophen   Tablet 650 milliGRAM(s) Oral once  ALBUTerol/ipratropium for Nebulization 3 milliLiter(s) Nebulizer every 6 hours  buDESOnide   0.5 milliGRAM(s) Respule 0.5 milliGRAM(s) Inhalation every 12 hours  cefepime  IVPB 1000 milliGRAM(s) IV Intermittent every 12 hours  dextrose 5%. 1000 milliLiter(s) (50 mL/Hr) IV Continuous <Continuous>  dextrose 50% Injectable 12.5 Gram(s) IV Push once  dextrose 50% Injectable 25 Gram(s) IV Push once  dextrose 50% Injectable 25 Gram(s) IV Push once  diphenhydrAMINE   Injectable 25 milliGRAM(s) IV Push once  docusate sodium 100 milliGRAM(s) Oral three times a day  eculizumab IVPB 900 milliGRAM(s) IV Intermittent once  escitalopram 20 milliGRAM(s) Oral daily  furosemide   Injectable 60 milliGRAM(s) IV Push every 12 hours  insulin glargine Injectable (LANTUS) 32 Unit(s) SubCutaneous at bedtime  insulin lispro (HumaLOG) corrective regimen sliding scale   SubCutaneous every 6 hours  nystatin Powder 1 Application(s) Topical two times a day  ondansetron  IVPB 16 milliGRAM(s) IV Intermittent once  pantoprazole    Tablet 40 milliGRAM(s) Oral before breakfast  predniSONE   Tablet 50 milliGRAM(s) Oral daily  propranolol 10 milliGRAM(s) Oral daily    MEDICATIONS  (PRN):  dextrose Gel 1 Dose(s) Oral once PRN Blood Glucose LESS THAN 70 milliGRAM(s)/deciliter  glucagon  Injectable 1 milliGRAM(s) IntraMuscular once PRN Glucose LESS THAN 70 milligrams/deciliter  ondansetron Injectable 4 milliGRAM(s) IV Push every 6 hours PRN Nausea and/or Vomiting  polyethylene glycol 3350 17 Gram(s) Oral at bedtime PRN Constipation  senna 2 Tablet(s) Oral at bedtime PRN Constipation  sodium chloride 0.65% Nasal 1 Spray(s) Both Nostrils daily PRN Nasal Congestion    Allergies  Levaquin (Short breath)  penicillin (Swelling)    Review of Systems:  General:  +generalized fatigue No fever or chills   CV:  No pain, palpitations, hypo/hypertension  Resp:  +cough +SOB  :  No pain, bleeding, incontinence, nocturia  Muscle:  No pain, +generalized weakness  Psych:  +generalized fatigue  Endocrine:  No polyuria, polydypsia, cold/heat intolerance  Heme:  +met breast CA  Skin:  no rash,+edema    Vital Signs Last 24 Hrs  T(C): 36.5 (23 Feb 2018 05:35), Max: 36.5 (23 Feb 2018 05:35)  T(F): 97.7 (23 Feb 2018 05:35), Max: 97.7 (23 Feb 2018 05:35)  HR: 74 (23 Feb 2018 05:35) (74 - 82)  BP: 108/61 (23 Feb 2018 05:35) (101/69 - 108/61)  BP(mean): --  RR: 18 (23 Feb 2018 05:35) (17 - 18)  SpO2: 95% (23 Feb 2018 05:35) (95% - 99%)    PHYSICAL EXAM:  Constitutional: elderly female frail appearing,  lying in bed, lethargic but arousable appears comfortable, on O2 via nasal cannula  Neck: No LAD, supple, no JVD  Respiratory: decreased BS at bases, no rales or wheeze  Chest wall: +rt. mastectomy, Lt. mediport  Cardiovascular: S1 and S2, RRR  Gastrointestinal: BS+, softly distended, NT no rebound, guarding or rigidity  Extremities: ++edema   Neurological: drowsy but responsive no focal asymmetry  Skin: No rashes, icteric    LABS:                        8.8    7.5   )-----------( 48       ( 23 Feb 2018 07:13 )             25.4     02-23    144  |  100  |  112<H>  ----------------------------<  119<H>  3.8   |  24  |  2.43<H>    Ca    9.9      23 Feb 2018 07:13    TPro  5.6<L>  /  Alb  4.0  /  TBili  9.9<H>  /  DBili  x   /  AST  83<H>  /  ALT  48<H>  /  AlkPhos  160<H>  02-23        RADIOLOGY & ADDITIONAL TESTS:

## 2018-02-23 NOTE — PROGRESS NOTE ADULT - ASSESSMENT
72 year old female with breast cancer with mets to lung, liver, bones, s/p mastectomy, s/p depression, HTN, HLD, T2DM admitted with LE edema/ SOB     1. cv stable     2. SOB, likely secondary to pulm disease, met lung disease, volume overload  echo revealing normal lv sys fx   VQ scan revealing low probability of PE   on  iv lasix   monitor creat level  pulm f/u     3. LE edema   likely secondary to hypoalbuminemia / extensive mets disease   s/p albumin   on lasix   med/ onc / renal f/u      4 htn   on  propranolol   monitor BP     5. elevated LFTS  gi f/u   s/p paracentesis  with replacement of albumin    6. PIERRE   renal f.u     dvt ppx 72 year old female with breast cancer with mets to lung, liver, bones, s/p mastectomy, s/p depression, HTN, HLD, T2DM admitted with LE edema/ SOB     1. cv stable     2. SOB, likely secondary to pulm disease, met lung disease, volume overload  echo revealing normal lv sys fx   VQ scan revealing low probability of PE   on iv lasix   monitor creat level  pulm f/u     3. LE edema   likely secondary to hypoalbuminemia / extensive mets disease   s/p albumin   on lasix   med/ onc / renal f/u      4 htn   on  propranolol   monitor BP     5. elevated LFTS  gi f/u   s/p paracentesis  with replacement of albumin    6. PIERRE   renal f.u     dvt ppx

## 2018-02-23 NOTE — PROGRESS NOTE ADULT - ASSESSMENT
· Assessment		  71 yo female with PMH of breast cancer with mets to lung, liver, bones, s/p mastectomy, s/p depression, HTN, HLD, T2DM, presents with SOB and LE swelling.  Pneumonia/ Mucus plug- continue empiric antibiotics.  Pulmonary follow.  ID follow         ·  Problem: CHF exacerbation.   monitor ins/outs and daily weight   cardiology follow Dr. Amaral        ·  Problem: PIERRE (acute kidney injury).  Plan: baseline creatinine likely 0.8  unclear etiology of PIERRE,   will monitor BMP  Nephrology follow Dr. Taylor noted.  s/p supplement albumin/ Lasix      ·  Problem: Elevated LFTs.  Plan: likely due to metastasis  no biliary obstruction seen on CT scan  monitor LFTs     Problem: Type 2 diabetes mellitus.  sliding scale      ·  Problem: Depression.  Plan: escitalopram.     ·  Problem: Thrombocytopenia.  Plan: platelet was 89 on 2/1/18  ?chemo induced  will monitor for now.       ·  Problem: Prophylactic measure.  Plan: no AC at this time, due to rapidly worsening thrombocytopenia  monitor platelet for now.    Metastatic breast cancer-  s/p treatment with Solinis.     Order for DNR/DNI placed.  Constipation- bowel regimen  Nausea control  Urinary retention- Peters  Prognosis guarded. If no improvement will ask Palliative eval re GOC.    Rick Jacobo MD pager [phone]

## 2018-02-23 NOTE — PROGRESS NOTE ADULT - PROBLEM SELECTOR PLAN 1
Pre Renal vs ATN ( gemcitabine /TMA ) vs HRS.   aHUS per onc- to get ecluzimab  would check adams13,c3,c4, and recheck ua and urine lytes  unclear as to how much urineoutput  will recheck renal us  on lasix 60mg iv bid  monitor i/o's  trend bmp

## 2018-02-23 NOTE — PROGRESS NOTE ADULT - SUBJECTIVE AND OBJECTIVE BOX
Patient seen and examined  more lethargic  denies any sob or cp    Levaquin (Short breath)  penicillin (Swelling)    Hospital Medications:   MEDICATIONS  (STANDING):  ALBUTerol/ipratropium for Nebulization 3 milliLiter(s) Nebulizer every 6 hours  buDESOnide   0.5 milliGRAM(s) Respule 0.5 milliGRAM(s) Inhalation every 12 hours  cefepime  IVPB 1000 milliGRAM(s) IV Intermittent every 12 hours  dextrose 5%. 1000 milliLiter(s) (50 mL/Hr) IV Continuous <Continuous>  dextrose 50% Injectable 12.5 Gram(s) IV Push once  dextrose 50% Injectable 25 Gram(s) IV Push once  dextrose 50% Injectable 25 Gram(s) IV Push once  docusate sodium 100 milliGRAM(s) Oral three times a day  escitalopram 20 milliGRAM(s) Oral daily  furosemide   Injectable 60 milliGRAM(s) IV Push every 12 hours  insulin glargine Injectable (LANTUS) 32 Unit(s) SubCutaneous at bedtime  insulin lispro (HumaLOG) corrective regimen sliding scale   SubCutaneous every 6 hours  nystatin Powder 1 Application(s) Topical two times a day  pantoprazole    Tablet 40 milliGRAM(s) Oral before breakfast  predniSONE   Tablet 50 milliGRAM(s) Oral daily  propranolol 10 milliGRAM(s) Oral daily        VITALS:  T(F): 97.5 (02-23-18 @ 10:44), Max: 97.7 (02-23-18 @ 05:35)  HR: 76 (02-23-18 @ 10:44)  BP: 98/60 (02-23-18 @ 10:44)  RR: 16 (02-23-18 @ 10:44)  SpO2: 95% (02-23-18 @ 05:35)  Wt(kg): --    02-22 @ 07:01  -  02-23 @ 07:00  --------------------------------------------------------  IN: 455 mL / OUT: 0 mL / NET: 455 mL    02-23 @ 07:01  -  02-23 @ 13:11  --------------------------------------------------------  IN: 340 mL / OUT: 0 mL / NET: 340 mL        PHYSICAL EXAM:  Constitutional: NAD  HEENT: anicteric sclera, oropharynx clear, MMM  Neck: No JVD  Respiratory: rt lung mild wheezing, dec bibasilar BS, no crepts appreciated  Cardiovascular: S1, S2, RRR  Gastrointestinal: BS+, soft, NT, obese  Extremities: 4+ pitting edema b/l  Neurological: A/O x 3, no focal deficits  Psychiatric: Normal mood, normal affect  : No CVA tenderness. No carty.   Skin: No rashes:    LABS:  02-23    144  |  100  |  112<H>  ----------------------------<  119<H>  3.8   |  24  |  2.43<H>    Ca    9.9      23 Feb 2018 07:13    TPro  5.6<L>  /  Alb  4.0  /  TBili  9.9<H>  /  DBili      /  AST  83<H>  /  ALT  48<H>  /  AlkPhos  160<H>  02-23    Creatinine Trend: 2.43 <--, 2.42 <--, 2.17 <--, 1.86 <--, 1.82 <--, 1.94 <--, 2.01 <--                        8.8    7.5   )-----------( 48       ( 23 Feb 2018 07:13 )             25.4     Urine Studies:    Sodium, Random Urine: <20 mmol/L (02-18 @ 06:40)  Chloride, Random Urine: <35 mmol/L (02-18 @ 06:40)  Potassium, Random Urine: 44 mmol/L (02-18 @ 06:40)  Creatinine, Random Urine: 127 mg/dL (02-18 @ 06:40)  Sodium, Random Urine: <20 mmol/L (02-17 @ 10:26)  Chloride, Random Urine: <20 mmol/L (02-17 @ 10:26)  Creatinine, Random Urine: 118 mg/dL (02-17 @ 10:26)  Osmolality, Random Urine: 386 mos/kg (02-17 @ 10:26)  Potassium, Random Urine: 49 mmol/L (02-17 @ 10:26)  Potassium, Random Urine: 45 mmol/L (02-16 @ 17:11)    RADIOLOGY & ADDITIONAL STUDIES:

## 2018-02-23 NOTE — PROGRESS NOTE ADULT - SUBJECTIVE AND OBJECTIVE BOX
[facility] - [facility]     PROGRESS NOTE    CHIEF COMPLAINT: pneumonia; pleural effusions; atelectasis; dyspnea; hypoxemia; radiation pneumonitis; abnormal CXR; bronchospasm    INTERVAL HISTORY: lethargic after receiving chemotherapy this morning; no respiratory distress on nasal canula; no fevers, chills or sweats; resolved cough. chest congestion and wheeze; no chest pain/pressure or palpitations; eating very little;     REVIEW OF SYSTEMS:  Constitutional: As per interval history  HEENT: Within normal limits  CV: As per interval history  Resp: As per interval history  GI: Within normal limits   : Within normal limits  Musculoskeletal: Within normal limits  Skin: Within normal limits  Neurological: Within normal limits  Psychiatric: Within normal limits  Endocrine: Within normal limits  Hematologic/Lymphatic: Within normal limits  Allergic/Immunologic: Within normal limits    [x] Unable to assess ROS because of lethargy    MEDICATIONS:     Pulmonary "  ALBUTerol/ipratropium for Nebulization 3 milliLiter(s) Nebulizer every 6 hours  buDESOnide   0.5 milliGRAM(s) Respule 0.5 milliGRAM(s) Inhalation every 12 hours      Anti-microbials:  cefepime  IVPB 1000 milliGRAM(s) IV Intermittent every 12 hours      Cardiovascular:  furosemide   Injectable 60 milliGRAM(s) IV Push every 12 hours  propranolol 10 milliGRAM(s) Oral daily      Other:  dextrose 5%. 1000 milliLiter(s) IV Continuous <Continuous>  dextrose 50% Injectable 12.5 Gram(s) IV Push once  dextrose 50% Injectable 25 Gram(s) IV Push once  dextrose 50% Injectable 25 Gram(s) IV Push once  dextrose Gel 1 Dose(s) Oral once PRN  docusate sodium 100 milliGRAM(s) Oral three times a day  escitalopram 20 milliGRAM(s) Oral daily  glucagon  Injectable 1 milliGRAM(s) IntraMuscular once PRN  insulin glargine Injectable (LANTUS) 32 Unit(s) SubCutaneous at bedtime  insulin lispro (HumaLOG) corrective regimen sliding scale   SubCutaneous every 6 hours  nystatin Powder 1 Application(s) Topical two times a day  ondansetron Injectable 4 milliGRAM(s) IV Push every 6 hours PRN  pantoprazole    Tablet 40 milliGRAM(s) Oral before breakfast  polyethylene glycol 3350 17 Gram(s) Oral at bedtime PRN  predniSONE   Tablet 50 milliGRAM(s) Oral daily  senna 2 Tablet(s) Oral at bedtime PRN  sodium chloride 0.65% Nasal 1 Spray(s) Both Nostrils daily PRN        OBJECTIVE:    I&O's Detail    2018 07:01  -  2018 07:00  --------------------------------------------------------  IN:    IV PiggyBack: 100 mL    Oral Fluid: 355 mL  Total IN: 455 mL    OUT:  Total OUT: 0 mL    Total NET: 455 mL      2018 07:01  -  2018 17:38  --------------------------------------------------------  IN:    IV PiggyBack: 50 mL    Oral Fluid: 110 mL    Solution: 180 mL  Total IN: 340 mL    OUT:    Voided: 100 mL  Total OUT: 100 mL    Total NET: 240 mL    POCT Blood Glucose.: 72 mg/dL (2018 11:42)  POCT Blood Glucose.: 101 mg/dL (2018 07:43)  POCT Blood Glucose.: 116 mg/dL (2018 05:55)  POCT Blood Glucose.: 158 mg/dL (2018 23:46)  POCT Blood Glucose.: 167 mg/dL (2018 22:00)  POCT Blood Glucose.: 180 mg/dL (2018 18:41)      PHYSICAL EXAM:       ICU Vital Signs Last 24 Hrs  T(C): 36.4 (2018 17:31), Max: 36.5 (2018 05:35)  T(F): 97.6 (2018 17:31), Max: 97.7 (2018 05:35)  HR: 81 (2018 17:31) (74 - 82)  BP: 115/69 (2018 17:31) (98/60 - 115/69)  BP(mean): --  ABP: --  ABP(mean): --  RR: 17 (2018 17:31) (16 - 18)  SpO2: 95% (2018 05:35) (95% - 99%) on 2lpm     General: Lethargic. No distress. Appears stated age 	  HEENT:   AT/NC/Anicteric. PERRL/EOMI. Normal oral mucosa,  Neck: Supple. Trachea midline. Thyroid without enlargement/tenderness/nodules. No carotid bruit. No JVD	  Cardiovascular: Regular rate and rhythm. S1 S2 normal. No M/R/G  Respiratory: Respirations unlabored. Decreased breath sounds at the bases with dullness  Abdomen: Soft. Non-tender. Distended. No organomegaly. No masses. Normal BS. Abdominal wall edema  Extremities: Warm to touch. Moderat lower extremity edema.   Pulses: 2+ peripheral pulses all extremities	  Skin: Normal skin color. No rashes or lesions. No ecchymoses. No cyanosis.  Warm to touch  Lymph Nodes: Cervical, supraclavicular and axillary nodes normal  Neurological: Immobile. A and O x 0  Psychiatry: Lethargic      LABS:                        8.8    7.5   )-----------( 48       ( 2018 07:13 )             25.4                         8.4    6.8   )-----------( 51       ( 2018 07:17 )             24.9         144  |  100  |  112<H>  ----------------------------<  119<H>  3.8   |  24  |  2.43<H>        143  |  96  |  106<H>  ----------------------------<  161<H>  4.1   |  25  |  2.42<H>    Ca      9.9          Ca      9.8            TPro  5.6<L>  /  Alb  4.0  /  TBili  9.9<H>  /  DBili  x   /  AST  83<H>  /  ALT  48<H>  /  AlkPhos  160<H>      TPro  5.6<L>  /  Alb  4.3  /  TBili  8.2<H>  /  DBili  x   /  AST  87<H>  /  ALT  40  /  AlkPhos  160<H>      ABG - ( 2018 10:33 )  pH: 7.50  /  pCO2: 34    /  pO2: 73    / HCO3: 27    / Base Excess: 4.0   /  SaO2: 95        Procalcitonin, Serum: 1.41 ng/mL ( @ 10:14)    Procalcitonin, Serum: 1.48 ng/mL ( @ 11:10)    Ammonia, Serum (18 @ 06:35)    Ammonia, Serum: 61 umol/L    Ammonia, Serum (18 @ 17:59)    Ammonia, Serum: 47 umol/L    < from: Transthoracic Echocardiogram (18 @ 08:59) >    Patient name: STELLA SIMMONS  YOB: 1945   Age: 72 (F)   MR#: [id number]  Study Date: 2018  Location: [address] [place], [place] [id number]: [facility]  Study quality: Technically difficult  Referring Physician: Rick Jacobo MD  Blood Pressure: 124/72 mmHg  Height: 163 cm  Weight: 93 kg  BSA: 2 m2  ------------------------------------------------------------------------  PROCEDURE: Transthoracic echocardiogram with 2-D, M-Mode  and complete spectral and color flow Doppler.  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 Mild mitral  regurgitation.  Aortic Valve/Aorta: Calcified trileaflet aortic valve with  normal opening. Mild aortic regurgitation.  Normal aortic root size. (Ao: 2.9 cm at the sinuses of  Valsalva).  Left Atrium: Normal left atrium.  LA volume index = 26  cc/m2.  Left Ventricle: Normal left ventricular systolic function.  No segmental wall motion abnormalities. Normal left  ventricular internal dimensions and wall thicknesses.  Right Heart: Normal right atrium. The right ventricle is  not well visualized; grossly normal right ventricular  systolic function. Normal tricuspid valve. Mild-moderate  tricuspid regurgitation. Pulmonic valve not well  visualized, probably normal. Minimal pulmonic  regurgitation.  Pericardium/Pleura: No pericardial effusion seen. Bilateral  pleural effusions. Ascites seen.  Hemodynamic: Estimated right atrial pressure is 8 mm Hg.  Estimated right ventricular systolic pressure equals 42 mm  Hg, assuming right atrial pressure equals 8 mm Hg,  consistent with mild pulmonary hypertension.  ------------------------------------------------------------------------  Conclusions:  1. Calcified trileaflet aortic valve with normalopening.  Mild aortic regurgitation.  2. Normal left ventricular systolic function. No segmental  wall motion abnormalities.  3. The right ventricle is not well visualized; grossly  normal right ventricular systolic function.  4. No pericardial effusion seen. Bilateral pleural  effusions. Ascites seen.  *** No previous Echo exam.  ------------------------------------------------------------------------  Confirmed on  2018 - 15:24:26 by Petey Cortes M.D.  ------------------------------------------------------------------------    < end of copied text >  --------------------------------------------------------------------------------------------  MICROBIOLOGY:   Urinalysis Basic - ( 2018 16:39 )    Color: Yellow / Appearance: Clear / S.012 / pH: x  Gluc: x / Ketone: Negative  / Bili: Negative / Urobili: Negative   Blood: x / Protein: Trace / Nitrite: Negative   Leuk Esterase: Negative / RBC: 0-2 /HPF / WBC 2-5 /HPF   Sq Epi: x / Non Sq Epi: Occasional /HPF / Bacteria: Few /HPF    Culture - Body Fluid with Gram Stain (18 @ 19:03)    Gram Stain:   polymorphonuclear leukocytes seen  No organisms seen  by cytocentrifuge    Specimen Source: Peritoneal Peritoneal Fluid    Culture Results:   No growth    Culture - Acid Fast - Body Fluid w/Smear (18 @ 19:03)    Specimen Source: .Body Fluid Peritoneal Fluid    Acid Fast Bacilli Smear:   No acid fast bacilli seen by fluorochrome stain    Rapid Respiratory Viral Panel (18 @ 17:08)    Rapid RVP Result: NotDetec: The FilmArray RVP Rapid uses polymerase chain reaction (PCR) and melt  curve analysis to screen for adenovirus; coronavirus HKU1, NL63, 229E,  OC43; human metapneumovirus (hMPV); human enterovirus/rhinovirus  (Entero/RV); influenza A; influenza A/H1;influenza A/H3; influenza  A/H1-2009; influenza B; parainfluenza viruses 1, 2, 3, 4; respiratory  syncytial virus; Bordetella pertussis; Mycoplasma pneumoniae; and  Chlamydophila pneumoniae.    RADIOLOGY:  [x] Chest radiographs reviewed and interpreted by me    < from: CT Chest No Cont (18 @ 11:27) >    EXAM:  CT CHEST                            PROCEDURE DATE:  2018        INTERPRETATION:  CLINICAL INFORMATION: Metastatic breast cancer status   post chest irradiation, rule out pneumonia    PROCEDURE:   CT of the Chest was performed without intravenous contrast.   Intravenous contrast: None.    Reconstructions were performed in axial thin, axial maximum intensity   projection, sagittal and coronal planes.    COMPARISON: CT abdomen pelvis 2018. Chest x-ray 2018.    FINDINGS:    LUNGS AND LARGE AIRWAYS: There is near complete occlusion of the left   lower lobe bronchus and partial collapse of the left lower lobe. There is   patchy opacity in the left lower lobe which may represent pneumonia..   Segmental atelectasis is noted within the bilateral upper lobes.   Nonspecific peripheral patchy and groundglass opacities in the right   upper lobe.  PLEURA: Moderate bilateral pleural effusions with associated lower lobe   passive atelectasis.  VESSELS: Atherosclerotic calcifications of the aorta and coronary   arteries.  HEART: Heart size is normal. Trace pericardial effusion.  MEDIASTINUM AND KENISHA: No lymphadenopathy.  CHEST WALL AND LOWER NECK: Left chest wall Mediport catheter distal tip   is in the SVC. Status post right mastectomy. Subcutaneous edema.  UPPER ABDOMEN: Cirrhotic liver with multiple hyper and hypodense lesions   is unchanged. Moderate volume perihepatic and perisplenic ascites.  BONES: Sclerotic osseous metastases, most predominantly in the T6 and T8   vertebral bodies, as well as in the sternum.    IMPRESSION:    Moderate bilateral pleural effusions with bilateral lower and upper lobe   atelectasis. Left lower lobe atelectasis may be secondary to a left lower   lobe bronchus mucous plug.    Patchy groundglass opacities in the right upper lobe and left lower lobe   may be secondary to infection.    Hepatic and blastic osseous metastases.    CHELSY ENCISO M.D., RADIOLOGY RESIDENT  This document has been electronically signed.  MICAH LOPEZ M.D., ATTENDING RADIOLOGIST  This document has been electronically signed. 2018 12:52PM      < end of copied text >  ---------------------------------------------------------------------------------------------------------  < from: Xray Abdomen 1 View PORTABLE -Urgent (18 @ 17:44) >    EXAM:  XR ABDOMEN PORTABLE URGENT 1V                            PROCEDURE DATE:  2018      INTERPRETATION:  EXAMINATION: XR ABDOMEN PORTABLE URGENT 1V    CLINICAL INDICATION: abdominal distention    TECHNIQUE: 2 portable views of the abdomen were obtained.    COMPARISON: CT abdomen 2018.    IMPRESSION:    The study is limited by technique and patient's body habitus. There are   nonspecific mildly dilated loops of small bowel. There is stool in the   rectum. There is centralization of the bowel loops, compatible with a   known ascites. There is a left pleural effusion and left basilar   atelectasis versus pneumonia. Again seen, are osseous metastasis. There   are degenerative changes of the visualized spine.    RIANNA SANTIAGO M.D., ATTENDING RADIOLOGIST  This document has been electronically signed. 2018  2:33PM      < end of copied text >  ---------------------------------------------------------------------------------------------------------  < from: NM Pulmonary Ventilation/Perfusion Scan (18 @ 10:56) >    EXAM:  NM PULM VENTILATION PERFUS IMG                            PROCEDURE DATE:  2018      INTERPRETATION:  RADIOPHARMACEUTICAL: 1 mCi Tc-99m-DTPA aerosol by   inhalation; 6 mCi Tc-99m-MAA, I.V.    CLINICAL STATEMENT: 72-year-old female with shortness of breath and   hypoxia.    IMPRESSION: Abnormal ventilation/perfusion lung scan.    Very low probability of pulmonary embolus.    GENE TRONCO M.D., NUCLEAR MEDICINE ATTENDING  This document has been electronically signed. 2018 11:19AM      < end of copied text >  ---------------------------------------------------------------------------------------------------------    < from: CT Abdomen and Pelvis No Cont (18 @ 20:02) >    EXAM:  CT ABDOMEN AND PELVIS                            PROCEDURE DATE:  2018        INTERPRETATION:  CLINICAL INFORMATION: Elevated bilirubin. History of   breast cancer. Shortness of breath.    COMPARISON: None.    PROCEDURE:   CT of the Abdomen and Pelvis was performed without intravenous contrast.   Intravenous contrast: None.  Oral contrast: None.  Sagittal and coronal reformats were performed.    FINDINGS:    LOWER CHEST: Small left pleural effusion. Left basilar consolidation may   represent atelectasis or pneumonia. Coronary artery calcifications. Right   mastectomy.    LIVER: Nodular hepatic contour. Multiple bilobar ill-defined hypodense   hepatic lesions compatible with metastatic disease. Two lesions in the   right hepatic lobe demonstrate peripheral calcification.  BILE DUCTS: Normal caliber.  GALLBLADDER: Collapsed and therefore not well evaluated.  SPLEEN: Within normal limits.  PANCREAS: Within normal limits.  ADRENALS: Within normal limits.  KIDNEYS/URETERS: Subcentimeter hyperdense left renal focus too small to   characterize.    BLADDER: Within normal limits.  REPRODUCTIVE ORGANS: Anterior fundal fibroid.    BOWEL: Diverticulosis without diverticulitis. No bowel obstruction.   Appendix is not identified.  PERITONEUM: Moderate volume ascites.  VESSELS:  Atheromatous disease of the aorta.  RETROPERITONEUM: No lymphadenopathy.    ABDOMINAL WALL: Within normal limits.  BONES: Sclerotic osseous metastases in multiple vertebral bodies,   sternum, and ribs.    IMPRESSION:   Bilobar hepatic metastases.  Sclerotic osseous metastases.  Small left pleural effusion. Left basilar consolidation may represent   atelectasis or pneumonia.       ANAMARIA MORRIS M.D., RADIOLOGY RESIDENT  This document has been electronically signed.  MAGDY KEN M.D., ATTENDING RADIOLOGIST  This document has been electronically signed. 2018  8:27PM      < end of copied text >  ---------------------------------------------------------------------------------------------------------    < from: US Abdomen Doppler (18 @ 15:18) >    EXAM:  DUPLEX SCAN EXT VEINS LOWER BI                          EXAM:  US DPLX ABDOMEN                          IMPRESSION:    No evidence of splenic, portal, or hepatic vein thrombosis.    MILTON LAGOS M.D., RADIOLOGY RESIDENT  This document has been electronically signed.  SOFI REYEZ M.D., ATTENDING RADIOLOGIST  This document has been electronically signed. 2018  1:00PM      < end of copied text >  ---------------------------------------------------------------------------------------------------------

## 2018-02-23 NOTE — PROGRESS NOTE ADULT - SUBJECTIVE AND OBJECTIVE BOX
CARDIOLOGY FOLLOW UP - Dr. Amaral    CC  no chest pain or sob        PHYSICAL EXAM:  T(C): 36.5 (02-23-18 @ 05:35), Max: 36.5 (02-23-18 @ 05:35)  HR: 74 (02-23-18 @ 05:35) (74 - 82)  BP: 108/61 (02-23-18 @ 05:35) (101/69 - 108/61)  RR: 18 (02-23-18 @ 05:35) (17 - 18)  SpO2: 95% (02-23-18 @ 05:35) (95% - 99%)  Wt(kg): --  I&O's Summary    22 Feb 2018 07:01  -  23 Feb 2018 07:00  --------------------------------------------------------  IN: 455 mL / OUT: 0 mL / NET: 455 mL        Appearance: Normal	  Cardiovascular: Normal S1 S2,RRR, No JVD, No murmurs  Respiratory: decrease BS at base  	  Gastrointestinal:  Soft, Non-tender, + BS	  Extremities: ++edema         MEDICATIONS  (STANDING):  ALBUTerol/ipratropium for Nebulization 3 milliLiter(s) Nebulizer every 6 hours  buDESOnide   0.5 milliGRAM(s) Respule 0.5 milliGRAM(s) Inhalation every 12 hours  cefepime  IVPB 1000 milliGRAM(s) IV Intermittent every 12 hours  dextrose 5%. 1000 milliLiter(s) (50 mL/Hr) IV Continuous <Continuous>  dextrose 50% Injectable 12.5 Gram(s) IV Push once  dextrose 50% Injectable 25 Gram(s) IV Push once  dextrose 50% Injectable 25 Gram(s) IV Push once  docusate sodium 100 milliGRAM(s) Oral three times a day  eculizumab IVPB 900 milliGRAM(s) IV Intermittent once  escitalopram 20 milliGRAM(s) Oral daily  furosemide   Injectable 60 milliGRAM(s) IV Push every 12 hours  insulin glargine Injectable (LANTUS) 32 Unit(s) SubCutaneous at bedtime  insulin lispro (HumaLOG) corrective regimen sliding scale   SubCutaneous every 6 hours  nystatin Powder 1 Application(s) Topical two times a day  pantoprazole    Tablet 40 milliGRAM(s) Oral before breakfast  predniSONE   Tablet 50 milliGRAM(s) Oral daily  propranolol 10 milliGRAM(s) Oral daily      TELEMETRY: 	    ECG:  	  RADIOLOGY:   DIAGNOSTIC TESTING:  [ ] Echocardiogram:  [ ]  Catheterization:  [ ] Stress Test:    OTHER: 	    LABS:	 	                                8.8    7.5   )-----------( 48       ( 23 Feb 2018 07:13 )             25.4     02-23    144  |  100  |  112<H>  ----------------------------<  119<H>  3.8   |  24  |  2.43<H>    Ca    9.9      23 Feb 2018 07:13    TPro  5.6<L>  /  Alb  4.0  /  TBili  9.9<H>  /  DBili  x   /  AST  83<H>  /  ALT  48<H>  /  AlkPhos  160<H>  02-23

## 2018-02-23 NOTE — ADVANCED PRACTICE NURSE CONSULT - ASSESSMENT
Pt with day 1/1 tx, labs noted in sunrise, height, weight and bsa verified, okay to proceed with tx as per MD Hauser.  Pt with left chest wall mediport + blood return noted, no pain, redness or swelling noted at site.  Pt premedicated with tylenol 650 mg po, benadryl 25 mg ivp and zofran 16 mg iv.  Pt lethargic but arousable with repeated stimuli, daughter at bedside, written material provided, discussed chemo regimen and signs and symptoms to report on to area nurse and staff, daughter verbalized understanding.  Emotional support provided.  Report given to area nurse. Pt with day 1/1 tx, labs noted in sunrise, height, weight and bsa verified, okay to proceed with tx as per MD Hauser.  Pt with left chest wall mediport + blood return noted, no pain, redness or swelling noted at site.  Pt premedicated with tylenol 650 mg po, benadryl 25 mg ivp and zofran 16 mg iv.  Pt administered soliris 900 mg iv over 35 min via locked pump.  Pt lethargic but arousable with repeated stimuli, daughter at bedside, written material provided, discussed chemo regimen and signs and symptoms to report on to area nurse and staff, daughter verbalized understanding.  Emotional support provided.  Report given to area nurse.

## 2018-02-23 NOTE — PROGRESS NOTE ADULT - SUBJECTIVE AND OBJECTIVE BOX
[facility] - [facility]     PROGRESS NOTE    CHIEF COMPLAINT:    INTERVAL HISTORY: received chemotherapy this AM; lethargic; no respiratory distress on nasal canula;     REVIEW OF SYSTEMS:  Constitutional: As per interval history  HEENT: Within normal limits  CV: As per interval history  Resp: As per interval history  GI: Within normal limits   : Within normal limits  Musculoskeletal: Within normal limits  Skin: Within normal limits  Neurological: Within normal limits  Psychiatric: Within normal limits  Endocrine: Within normal limits  Hematologic/Lymphatic: Within normal limits  Allergic/Immunologic: Within normal limits    [x] Unable to assess ROS because of lethargy    MEDICATIONS:     Pulmonary "  ALBUTerol/ipratropium for Nebulization 3 milliLiter(s) Nebulizer every 6 hours  buDESOnide   0.5 milliGRAM(s) Respule 0.5 milliGRAM(s) Inhalation every 12 hours      Anti-microbials:  cefepime  IVPB 1000 milliGRAM(s) IV Intermittent every 12 hours      Cardiovascular:  furosemide   Injectable 60 milliGRAM(s) IV Push every 12 hours  propranolol 10 milliGRAM(s) Oral daily      Other:  dextrose 5%. 1000 milliLiter(s) IV Continuous <Continuous>  dextrose 50% Injectable 12.5 Gram(s) IV Push once  dextrose 50% Injectable 25 Gram(s) IV Push once  dextrose 50% Injectable 25 Gram(s) IV Push once  dextrose Gel 1 Dose(s) Oral once PRN  docusate sodium 100 milliGRAM(s) Oral three times a day  escitalopram 20 milliGRAM(s) Oral daily  glucagon  Injectable 1 milliGRAM(s) IntraMuscular once PRN  insulin glargine Injectable (LANTUS) 32 Unit(s) SubCutaneous at bedtime  insulin lispro (HumaLOG) corrective regimen sliding scale   SubCutaneous every 6 hours  nystatin Powder 1 Application(s) Topical two times a day  ondansetron Injectable 4 milliGRAM(s) IV Push every 6 hours PRN  pantoprazole    Tablet 40 milliGRAM(s) Oral before breakfast  polyethylene glycol 3350 17 Gram(s) Oral at bedtime PRN  predniSONE   Tablet 50 milliGRAM(s) Oral daily  senna 2 Tablet(s) Oral at bedtime PRN  sodium chloride 0.65% Nasal 1 Spray(s) Both Nostrils daily PRN        OBJECTIVE:    I&O's Detail    2018 07:01  -  2018 07:00  --------------------------------------------------------  IN:    IV PiggyBack: 100 mL    Oral Fluid: 355 mL  Total IN: 455 mL    OUT:  Total OUT: 0 mL    Total NET: 455 mL      2018 07:01  -  2018 17:22  --------------------------------------------------------  IN:    IV PiggyBack: 50 mL    Oral Fluid: 110 mL    Solution: 180 mL  Total IN: 340 mL    OUT:    Voided: 100 mL  Total OUT: 100 mL    Total NET: 240 mL      POCT Blood Glucose.: 72 mg/dL (2018 11:42)  POCT Blood Glucose.: 101 mg/dL (2018 07:43)  POCT Blood Glucose.: 116 mg/dL (2018 05:55)  POCT Blood Glucose.: 158 mg/dL (2018 23:46)  POCT Blood Glucose.: 167 mg/dL (2018 22:00)  POCT Blood Glucose.: 180 mg/dL (2018 18:41)      PHYSICAL EXAM:       ICU Vital Signs Last 24 Hrs  T(C): 36.4 (2018 10:44), Max: 36.5 (2018 05:35)  T(F): 97.5 (2018 10:44), Max: 97.7 (2018 05:35)  HR: 76 (2018 10:44) (74 - 82)  BP: 98/60 (2018 10:44) (98/60 - 108/61)  BP(mean): --  ABP: --  ABP(mean): --  RR: 16 (2018 10:44) (16 - 18)  SpO2: 95% (2018 05:35) (95% - 99%)     General: Awake. Alert. Cooperative. No distress. Appears stated age 	  HEENT:   AT/NC/Anicteric. PERRL/EOMI. Normal oral mucosa,  Neck: Supple. Trachea midline. Thyroid without enlargement/tenderness/nodules. No carotid bruit. No JVD	  Cardiovascular: Regular rate and rhythm. S1 S2 normal. No M/R/G  Respiratory: Respirations unlabored. Clear to A&P  Abdomen: Soft. Non-tender. Non-distended. No organomegaly. No masses. Normal BS  Extremities: Warm to touch. No CCE  Pulses: 2+ peripheral pulses all extremities	  Skin: Normal skin color. No rashes or lesions. No ecchymoses. No cyanosis.  Warm to touch  Lymph Nodes: Cervical, supraclavicular and axillary nodes normal  Neurological: Motor and sensory examination equal and normal. A and O x 3  Psychiatry: Appropriate mood and affect.      LABS:                        8.8    7.5   )-----------( 48       ( 2018 07:13 )             25.4                         8.4    6.8   )-----------( 51       ( 2018 07:17 )             24.9         144  |  100  |  112<H>  ----------------------------<  119<H>  3.8   |  24  |  2.43<H>        143  |  96  |  106<H>  ----------------------------<  161<H>  4.1   |  25  |  2.42<H>    Ca      9.9          Ca      9.8            TPro  5.6<L>  /  Alb  4.0  /  TBili  9.9<H>  /  DBili  x   /  AST  83<H>  /  ALT  48<H>  /  AlkPhos  160<H>      TPro  5.6<L>  /  Alb  4.3  /  TBili  8.2<H>  /  DBili  x   /  AST  87<H>  /  ALT  40  /  AlkPhos  160<H>      Procalcitonin, Serum: 1.41 ng/mL ( @ 10:14)              MICROBIOLOGY:   Urinalysis Basic - ( 2018 16:39 )    Color: Yellow / Appearance: Clear / S.012 / pH: x  Gluc: x / Ketone: Negative  / Bili: Negative / Urobili: Negative   Blood: x / Protein: Trace / Nitrite: Negative   Leuk Esterase: Negative / RBC: 0-2 /HPF / WBC 2-5 /HPF   Sq Epi: x / Non Sq Epi: Occasional /HPF / Bacteria: Few /HPF        RADIOLOGY:  [ ] Chest radiographs reviewed and interpreted by me

## 2018-02-23 NOTE — PROGRESS NOTE ADULT - SUBJECTIVE AND OBJECTIVE BOX
Patient is a 72y old  Female who presents with a chief complaint of shortness of breath (2018 21:46)      SUBJECTIVE / OVERNIGHT EVENTS: lethargic, arrousable.  Review of Systems  unobtainable     MEDICATIONS  (STANDING):  ALBUTerol/ipratropium for Nebulization 3 milliLiter(s) Nebulizer every 6 hours  buDESOnide   0.5 milliGRAM(s) Respule 0.5 milliGRAM(s) Inhalation every 12 hours  cefepime  IVPB 1000 milliGRAM(s) IV Intermittent every 12 hours  dextrose 5%. 1000 milliLiter(s) (50 mL/Hr) IV Continuous <Continuous>  dextrose 50% Injectable 12.5 Gram(s) IV Push once  dextrose 50% Injectable 25 Gram(s) IV Push once  dextrose 50% Injectable 25 Gram(s) IV Push once  docusate sodium 100 milliGRAM(s) Oral three times a day  escitalopram 20 milliGRAM(s) Oral daily  furosemide   Injectable 60 milliGRAM(s) IV Push every 12 hours  insulin glargine Injectable (LANTUS) 32 Unit(s) SubCutaneous at bedtime  insulin lispro (HumaLOG) corrective regimen sliding scale   SubCutaneous every 6 hours  nystatin Powder 1 Application(s) Topical two times a day  pantoprazole    Tablet 40 milliGRAM(s) Oral before breakfast  predniSONE   Tablet 50 milliGRAM(s) Oral daily  propranolol 10 milliGRAM(s) Oral daily    MEDICATIONS  (PRN):  dextrose Gel 1 Dose(s) Oral once PRN Blood Glucose LESS THAN 70 milliGRAM(s)/deciliter  glucagon  Injectable 1 milliGRAM(s) IntraMuscular once PRN Glucose LESS THAN 70 milligrams/deciliter  ondansetron Injectable 4 milliGRAM(s) IV Push every 6 hours PRN Nausea and/or Vomiting  polyethylene glycol 3350 17 Gram(s) Oral at bedtime PRN Constipation  senna 2 Tablet(s) Oral at bedtime PRN Constipation  sodium chloride 0.65% Nasal 1 Spray(s) Both Nostrils daily PRN Nasal Congestion          PHYSICAL EXAM:  GENERAL: NAD  HEAD:  Atraumatic, Normocephalic  EYES: EOMI, PERRLA, conjunctiva and sclera jaundiced  NECK: Supple, No JVD  CHEST/LUNG: Clear to auscultation bilaterally; No wheeze  HEART: Regular rate and rhythm; No murmurs, rubs, or gallops  ABDOMEN: Soft, Nontender, Nondistended; Bowel sounds present  EXTREMITIES:  2+ Peripheral Pulses, No clubbing, cyanosis,3+ lata  NEUROLOGY: non-focal  SKIN: No rashes or lesions    LABS:                        8.8    7.5   )-----------( 48       ( 2018 07:13 )             25.4         144  |  100  |  112<H>  ----------------------------<  119<H>  3.8   |  24  |  2.43<H>    Ca    9.9      2018 07:13    TPro  5.6<L>  /  Alb  4.0  /  TBili  9.9<H>  /  DBili  x   /  AST  83<H>  /  ALT  48<H>  /  AlkPhos  160<H>            Urinalysis Basic - ( 2018 16:39 )    Color: Yellow / Appearance: Clear / S.012 / pH: x  Gluc: x / Ketone: Negative  / Bili: Negative / Urobili: Negative   Blood: x / Protein: Trace / Nitrite: Negative   Leuk Esterase: Negative / RBC: 0-2 /HPF / WBC 2-5 /HPF   Sq Epi: x / Non Sq Epi: Occasional /HPF / Bacteria: Few /HPF        RADIOLOGY & ADDITIONAL TESTS:    Imaging Personally Reviewed:    Consultant(s) Notes Reviewed:      Care Discussed with Consultants/Other Providers:

## 2018-02-24 LAB
ALBUMIN SERPL ELPH-MCNC: 3.8 G/DL — SIGNIFICANT CHANGE UP (ref 3.3–5)
ALP SERPL-CCNC: 156 U/L — HIGH (ref 40–120)
ALT FLD-CCNC: 46 U/L RC — HIGH (ref 10–45)
ANION GAP SERPL CALC-SCNC: 22 MMOL/L — HIGH (ref 5–17)
AST SERPL-CCNC: 88 U/L — HIGH (ref 10–40)
BILIRUB SERPL-MCNC: 10.3 MG/DL — HIGH (ref 0.2–1.2)
BUN SERPL-MCNC: 126 MG/DL — HIGH (ref 7–23)
C3 SERPL-MCNC: 53 MG/DL — LOW (ref 80–180)
C4 SERPL-MCNC: 12 MG/DL — SIGNIFICANT CHANGE UP (ref 10–45)
CALCIUM SERPL-MCNC: 9.7 MG/DL — SIGNIFICANT CHANGE UP (ref 8.4–10.5)
CHLORIDE SERPL-SCNC: 101 MMOL/L — SIGNIFICANT CHANGE UP (ref 96–108)
CO2 SERPL-SCNC: 24 MMOL/L — SIGNIFICANT CHANGE UP (ref 22–31)
CREAT SERPL-MCNC: 2.5 MG/DL — HIGH (ref 0.5–1.3)
GLUCOSE BLDC GLUCOMTR-MCNC: 109 MG/DL — HIGH (ref 70–99)
GLUCOSE BLDC GLUCOMTR-MCNC: 109 MG/DL — HIGH (ref 70–99)
GLUCOSE BLDC GLUCOMTR-MCNC: 127 MG/DL — HIGH (ref 70–99)
GLUCOSE BLDC GLUCOMTR-MCNC: 144 MG/DL — HIGH (ref 70–99)
GLUCOSE BLDC GLUCOMTR-MCNC: 63 MG/DL — LOW (ref 70–99)
GLUCOSE BLDC GLUCOMTR-MCNC: 64 MG/DL — LOW (ref 70–99)
GLUCOSE SERPL-MCNC: 136 MG/DL — HIGH (ref 70–99)
HCT VFR BLD CALC: 27.1 % — LOW (ref 34.5–45)
HGB BLD-MCNC: 8.7 G/DL — LOW (ref 11.5–15.5)
LDH SERPL L TO P-CCNC: 397 U/L — HIGH (ref 50–242)
MCHC RBC-ENTMCNC: 32.1 GM/DL — SIGNIFICANT CHANGE UP (ref 32–36)
MCHC RBC-ENTMCNC: 37.9 PG — HIGH (ref 27–34)
MCV RBC AUTO: 118 FL — HIGH (ref 80–100)
PLATELET # BLD AUTO: 38 K/UL — LOW (ref 150–400)
POTASSIUM SERPL-MCNC: 3.6 MMOL/L — SIGNIFICANT CHANGE UP (ref 3.5–5.3)
POTASSIUM SERPL-SCNC: 3.6 MMOL/L — SIGNIFICANT CHANGE UP (ref 3.5–5.3)
PROT SERPL-MCNC: 5.4 G/DL — LOW (ref 6–8.3)
RBC # BLD: 2.3 M/UL — LOW (ref 3.8–5.2)
RBC # BLD: 2.3 M/UL — LOW (ref 3.8–5.2)
RBC # FLD: 25.3 % — HIGH (ref 10.3–14.5)
RETICS #: 96.6 K/UL — SIGNIFICANT CHANGE UP (ref 25–125)
RETICS/RBC NFR: 4.1 % — HIGH (ref 0.5–2.5)
SODIUM SERPL-SCNC: 147 MMOL/L — HIGH (ref 135–145)
WBC # BLD: 7.4 K/UL — SIGNIFICANT CHANGE UP (ref 3.8–10.5)
WBC # FLD AUTO: 7.4 K/UL — SIGNIFICANT CHANGE UP (ref 3.8–10.5)

## 2018-02-24 RX ORDER — MIDODRINE HYDROCHLORIDE 2.5 MG/1
10 TABLET ORAL EVERY 8 HOURS
Qty: 0 | Refills: 0 | Status: DISCONTINUED | OUTPATIENT
Start: 2018-02-24 | End: 2018-02-25

## 2018-02-24 RX ORDER — DEXTROSE 50 % IN WATER 50 %
12.5 SYRINGE (ML) INTRAVENOUS ONCE
Qty: 0 | Refills: 0 | Status: COMPLETED | OUTPATIENT
Start: 2018-02-24 | End: 2018-02-24

## 2018-02-24 RX ORDER — FUROSEMIDE 40 MG
60 TABLET ORAL DAILY
Qty: 0 | Refills: 0 | Status: DISCONTINUED | OUTPATIENT
Start: 2018-02-24 | End: 2018-02-26

## 2018-02-24 RX ORDER — SODIUM CHLORIDE 9 MG/ML
1000 INJECTION INTRAMUSCULAR; INTRAVENOUS; SUBCUTANEOUS
Qty: 0 | Refills: 0 | Status: DISCONTINUED | OUTPATIENT
Start: 2018-02-24 | End: 2018-02-24

## 2018-02-24 RX ORDER — ALBUMIN HUMAN 25 %
100 VIAL (ML) INTRAVENOUS EVERY 6 HOURS
Qty: 0 | Refills: 0 | Status: DISCONTINUED | OUTPATIENT
Start: 2018-02-24 | End: 2018-02-25

## 2018-02-24 RX ADMIN — Medication 3 MILLILITER(S): at 23:52

## 2018-02-24 RX ADMIN — MIDODRINE HYDROCHLORIDE 10 MILLIGRAM(S): 2.5 TABLET ORAL at 13:20

## 2018-02-24 RX ADMIN — MIDODRINE HYDROCHLORIDE 10 MILLIGRAM(S): 2.5 TABLET ORAL at 21:49

## 2018-02-24 RX ADMIN — Medication 50 MILLILITER(S): at 13:19

## 2018-02-24 RX ADMIN — Medication 100 MILLIGRAM(S): at 13:20

## 2018-02-24 RX ADMIN — NYSTATIN CREAM 1 APPLICATION(S): 100000 CREAM TOPICAL at 17:07

## 2018-02-24 RX ADMIN — Medication 0.5 MILLIGRAM(S): at 17:07

## 2018-02-24 RX ADMIN — Medication 12.5 GRAM(S): at 06:43

## 2018-02-24 RX ADMIN — Medication 50 MILLILITER(S): at 17:51

## 2018-02-24 RX ADMIN — CEFEPIME 100 MILLIGRAM(S): 1 INJECTION, POWDER, FOR SOLUTION INTRAMUSCULAR; INTRAVENOUS at 07:11

## 2018-02-24 RX ADMIN — Medication 50 MILLIGRAM(S): at 07:10

## 2018-02-24 RX ADMIN — Medication 100 MILLIGRAM(S): at 21:49

## 2018-02-24 RX ADMIN — ESCITALOPRAM OXALATE 20 MILLIGRAM(S): 10 TABLET, FILM COATED ORAL at 12:07

## 2018-02-24 RX ADMIN — NYSTATIN CREAM 1 APPLICATION(S): 100000 CREAM TOPICAL at 07:10

## 2018-02-24 RX ADMIN — CEFEPIME 100 MILLIGRAM(S): 1 INJECTION, POWDER, FOR SOLUTION INTRAMUSCULAR; INTRAVENOUS at 17:07

## 2018-02-24 RX ADMIN — Medication 3 MILLILITER(S): at 17:06

## 2018-02-24 RX ADMIN — INSULIN GLARGINE 32 UNIT(S): 100 INJECTION, SOLUTION SUBCUTANEOUS at 23:52

## 2018-02-24 RX ADMIN — Medication 50 MILLILITER(S): at 23:51

## 2018-02-24 RX ADMIN — Medication 60 MILLIGRAM(S): at 08:57

## 2018-02-24 RX ADMIN — PANTOPRAZOLE SODIUM 40 MILLIGRAM(S): 20 TABLET, DELAYED RELEASE ORAL at 07:10

## 2018-02-24 RX ADMIN — Medication 3 MILLILITER(S): at 12:07

## 2018-02-24 RX ADMIN — Medication 100 MILLIGRAM(S): at 07:10

## 2018-02-24 RX ADMIN — Medication 0.5 MILLIGRAM(S): at 07:10

## 2018-02-24 RX ADMIN — Medication 3 MILLILITER(S): at 07:11

## 2018-02-24 NOTE — PROGRESS NOTE ADULT - ASSESSMENT
72 year old female with breast cancer with mets to lung, liver, bones, s/p mastectomy, s/p depression, HTN, HLD, T2DM admitted with LE edema/ SOB     1. cv stable   2. SOB  secondary to pulm disease, met lung disease, volume overload  echo revealing normal lv sys fx   VQ scan revealing low probability of PE   stable symptoms   on iv lasix    3. LE edema   likely secondary to hypoalbuminemia / extensive mets disease   s/p albumin   med/ onc / renal f/u    4 htn   on  propranolol   monitor BP   5. elevated LFTS  gi f/u   s/p paracentesis  with replacement of albumin  6. PIERRE   creat stable   7. encephalopathy  likely secondary to overall medical condition, po intake, hepatic/uremic issues  encourage in po intake   hold lasix   gentle hydration    palliative care ??  poor prognosis  dvt ppx

## 2018-02-24 NOTE — PROGRESS NOTE ADULT - PROBLEM SELECTOR PLAN 1
Pre Renal vs ATN ( gemcitabine /TMA ) vs HRS.   Differentials include multifactorial  U lytes also showing intravascular depletion  will start albumin 25% q6h x 48 hours  will d/c propanolol ( d/w cardio) and start midodrine 10mg po q8h  adams13,c3,c4,pending;; s/p ecluzimab  f/u renal US to make sure not obstructed as well  unclear as to how much urineoutput  dec lasix to 60mg iv QDAILY-  monitor i/o's  trend bmp  will d/w family about all options and how aggressive they want to be; will explain if Cr continues to worsen, HD may be the only option however it will unlikely change the overall prognosis

## 2018-02-24 NOTE — PROGRESS NOTE ADULT - ASSESSMENT
71 yo female with advanced breast cancer, dyspnea, failure to thrive, and possible pneumonia.  She has extensive liver mets, ascites, and restrictive lung disease, multiple reasons for dyspnea and anorexia  Patchy GG opacities on CT, possible infection, although difficult to be specific   Afebrile, normal WBC  Ascites fluid Cx negative  Liver and renal dysfx remains most limiting; concern for hemolysis noted- Creat and Bili still on rise  S/p Soliris and Mening vaccine  Remains quite debilitated    Plan:  Continue empiric Cefepime directed at possible pneumonia- another 24 hrs  Poor prognosis  D/w daughter at bedside

## 2018-02-24 NOTE — PROGRESS NOTE ADULT - ASSESSMENT
failure to thrive  metastatic breast CA    A) respiratory dysfunction    1) moderate bilateral pleural effusions with atelectasis due to translocation of ascitic fluid across the diaphragm versus accumulation of pleural fluid due to hypoalbuminemia  2) +/- pneumonia  3) restrictive lung disease due to obesity, radiation pneumonitis, decreased diaphragmatic excursion due to ascites and respiratory muscle weakness  4) mucous plugging with near complete occlusion of the left lower lobe bronchus and partial collapse of the left lower lobe    B) cognitive dysfunction with confusion, slurred speech and memory loss - encephalopathy multifactorial    C) extensive metastatic breast cancer to the bones, liver and lung s/p mastectomy, craniotomy and chest irradiation -  decreased blood flow through the liver with ascites and lower extremity swelling -     D) HUS    E) PIERRE    PLAN/RECOMMENDATIONS    oxygen supplementation to keep saturation greater than 92%  empiric cefepime  albuterol/atrovent/pulmicort nebs  prednisone 50mg daily. Taper tomorrow  patient is not a candidate for bronchoscopy given poor overall prognosis  on diuretics  bilateral SCDs  glucose control  bowel regimen  renal/ID/oncology follow-up    Pt. DNR/I. Seems terminal. D/w pt daughter    Juan Nelson MD  Pulmonary Medicine  [phone]

## 2018-02-24 NOTE — PROGRESS NOTE ADULT - SUBJECTIVE AND OBJECTIVE BOX
Patient is a 72y old  Female who presents with a chief complaint of shortness of breath (2018 21:46)      SUBJECTIVE / OVERNIGHT EVENTS: lethargic ,arroussable. Family at bedside.   Review of Systems  chest pain no  palpitations no  sob no  nausea no  headache no    MEDICATIONS  (STANDING):  albumin human 25% IVPB 100 milliLiter(s) IV Intermittent every 6 hours  ALBUTerol/ipratropium for Nebulization 3 milliLiter(s) Nebulizer every 6 hours  buDESOnide   0.5 milliGRAM(s) Respule 0.5 milliGRAM(s) Inhalation every 12 hours  cefepime  IVPB 1000 milliGRAM(s) IV Intermittent every 12 hours  dextrose 5%. 1000 milliLiter(s) (50 mL/Hr) IV Continuous <Continuous>  dextrose 50% Injectable 12.5 Gram(s) IV Push once  dextrose 50% Injectable 25 Gram(s) IV Push once  dextrose 50% Injectable 25 Gram(s) IV Push once  docusate sodium 100 milliGRAM(s) Oral three times a day  escitalopram 20 milliGRAM(s) Oral daily  furosemide   Injectable 60 milliGRAM(s) IV Push daily  insulin glargine Injectable (LANTUS) 32 Unit(s) SubCutaneous at bedtime  insulin lispro (HumaLOG) corrective regimen sliding scale   SubCutaneous every 6 hours  midodrine 10 milliGRAM(s) Oral every 8 hours  nystatin Powder 1 Application(s) Topical two times a day  pantoprazole    Tablet 40 milliGRAM(s) Oral before breakfast  predniSONE   Tablet 50 milliGRAM(s) Oral daily    MEDICATIONS  (PRN):  dextrose Gel 1 Dose(s) Oral once PRN Blood Glucose LESS THAN 70 milliGRAM(s)/deciliter  glucagon  Injectable 1 milliGRAM(s) IntraMuscular once PRN Glucose LESS THAN 70 milligrams/deciliter  ondansetron Injectable 4 milliGRAM(s) IV Push every 6 hours PRN Nausea and/or Vomiting  polyethylene glycol 3350 17 Gram(s) Oral at bedtime PRN Constipation  senna 2 Tablet(s) Oral at bedtime PRN Constipation  sodium chloride 0.65% Nasal 1 Spray(s) Both Nostrils daily PRN Nasal Congestion          PHYSICAL EXAM:  GENERAL: NAD  HEAD:  Atraumatic, Normocephalic  EYES: EOMI, PERRLA, conjunctiva and sclera clear  NECK: Supple, No JVD  CHEST/LUNG: Clear to auscultation bilaterally; No wheeze  HEART: Regular rate and rhythm; No murmurs, rubs, or gallops  ABDOMEN: Soft, Nontender, Nondistended; Bowel sounds present  EXTREMITIES:  2+ Peripheral Pulses, No clubbing, cyanosis, or edema  PSYCH: AAOx3  NEUROLOGY: non-focal  SKIN: No rashes or lesions    LABS:                        8.7    7.4   )-----------( 38       ( 2018 07:33 )             27.1     02-    147<H>  |  101  |  126<H>  ----------------------------<  136<H>  3.6   |  24  |  2.50<H>    Ca    9.7      2018 07:33    TPro  5.4<L>  /  Alb  3.8  /  TBili  10.3<H>  /  DBili  x   /  AST  88<H>  /  ALT  46<H>  /  AlkPhos  156<H>            Urinalysis Basic - ( 2018 16:39 )    Color: Yellow / Appearance: Clear / S.012 / pH: x  Gluc: x / Ketone: Negative  / Bili: Negative / Urobili: Negative   Blood: x / Protein: Trace / Nitrite: Negative   Leuk Esterase: Negative / RBC: 0-2 /HPF / WBC 2-5 /HPF   Sq Epi: x / Non Sq Epi: Occasional /HPF / Bacteria: Few /HPF        RADIOLOGY & ADDITIONAL TESTS:    Imaging Personally Reviewed:    Consultant(s) Notes Reviewed:      Care Discussed with Consultants/Other Providers:

## 2018-02-24 NOTE — PROGRESS NOTE ADULT - PROBLEM SELECTOR PLAN 4
- received soliris yesterday - weekly x 4 weeks and if response then q 2 wks  - s/p meningococcus vaccine  - follow daily cbc, ldh, retic, cmp for 2-3 d

## 2018-02-24 NOTE — PROGRESS NOTE ADULT - SUBJECTIVE AND OBJECTIVE BOX
CC: f/u for possible pneumonia    Patient remains quite weak, debilitated, poor po intake, jaundiced    REVIEW OF SYSTEMS:  limited    Antimicrobials Day # 6  cefepime  IVPB 1000 milliGRAM(s) IV Intermittent every 12 hours    Other Medications Reviewed    Vital Signs Last 24 Hrs  T(F): 97.8 (24 Feb 2018 08:54), Max: 98.6 (23 Feb 2018 22:02)  HR: 73 (24 Feb 2018 08:54) (71 - 85)  BP: 100/66 (24 Feb 2018 08:54) (94/61 - 115/69)  BP(mean): --  RR: 18 (24 Feb 2018 08:54) (17 - 18)  SpO2: 95% (24 Feb 2018 08:54) (94% - 99%)    PHYSICAL EXAM:  General: alert, no acute distress, jaundiced  Eyes:  icteric, no conjunctival injection, no discharge  Oropharynx: no lesions or injection 	  Neck: supple, without adenopathy  L Mediport site clean  Lungs: diminished bases  Heart: regular rate and rhythm; no murmur, rubs or gallops  Abdomen: soft, nondistended, nontender, without mass or organomegaly  Skin: no lesions  Extremities: leg edema  Neurologic: somnolent, arouses easily, moves all extremities    LAB RESULTS:                        8.7    7.4   )-----------( 38       ( 24 Feb 2018 07:33 )             27.1   02-24    147<H>  |  101  |  126<H>  ----------------------------<  136<H>  3.6   |  24  |  2.50<H>    Ca    9.7      24 Feb 2018 07:33    TPro  5.4<L>  /  Alb  3.8  /  TBili  10.3<H>  /  DBili  x   /  AST  88<H>  /  ALT  46<H>  /  AlkPhos  156<H>  02-24      MICROBIOLOGY:  RECENT CULTURES:  02-16 @ 19:03 .Body Fluid Peritoneal Fluid     No growth    Rapid Respiratory Viral Panel (02.20.18 @ 10:38)    Rapid RVP Result: [facility]      RADIOLOGY REVIEWED:  CT Chest No Cont (02.19.18 @ 11:27) >  Moderate bilateral pleural effusions with bilateral lower and upper lobe   atelectasis. Left lower lobe atelectasis may be secondary to a left lower   lobe bronchus mucous plug.    Patchy groundglass opacities in the right upper lobe and left lower lobe   may be secondary to infection.    Hepatic and blastic osseous metastases.

## 2018-02-24 NOTE — PROGRESS NOTE ADULT - SUBJECTIVE AND OBJECTIVE BOX
Pt is seen and examined  pt is awake and lying in bed/  weak, responsive, responds verbally but minimally.  Seems uncomfortable but not expressing specific complaints.  Received first dose Soliris yesterday.        MEDICATIONS  (STANDING):  albumin human 25% IVPB 100 milliLiter(s) IV Intermittent every 6 hours  ALBUTerol/ipratropium for Nebulization 3 milliLiter(s) Nebulizer every 6 hours  buDESOnide   0.5 milliGRAM(s) Respule 0.5 milliGRAM(s) Inhalation every 12 hours  cefepime  IVPB 1000 milliGRAM(s) IV Intermittent every 12 hours  dextrose 5%. 1000 milliLiter(s) (50 mL/Hr) IV Continuous <Continuous>  dextrose 50% Injectable 12.5 Gram(s) IV Push once  dextrose 50% Injectable 25 Gram(s) IV Push once  dextrose 50% Injectable 25 Gram(s) IV Push once  docusate sodium 100 milliGRAM(s) Oral three times a day  escitalopram 20 milliGRAM(s) Oral daily  furosemide   Injectable 60 milliGRAM(s) IV Push daily  insulin glargine Injectable (LANTUS) 32 Unit(s) SubCutaneous at bedtime  insulin lispro (HumaLOG) corrective regimen sliding scale   SubCutaneous every 6 hours  midodrine 10 milliGRAM(s) Oral every 8 hours  nystatin Powder 1 Application(s) Topical two times a day  pantoprazole    Tablet 40 milliGRAM(s) Oral before breakfast  predniSONE   Tablet 50 milliGRAM(s) Oral daily    MEDICATIONS  (PRN):  dextrose Gel 1 Dose(s) Oral once PRN Blood Glucose LESS THAN 70 milliGRAM(s)/deciliter  glucagon  Injectable 1 milliGRAM(s) IntraMuscular once PRN Glucose LESS THAN 70 milligrams/deciliter  ondansetron Injectable 4 milliGRAM(s) IV Push every 6 hours PRN Nausea and/or Vomiting  polyethylene glycol 3350 17 Gram(s) Oral at bedtime PRN Constipation  senna 2 Tablet(s) Oral at bedtime PRN Constipation  sodium chloride 0.65% Nasal 1 Spray(s) Both Nostrils daily PRN Nasal Congestion      Allergies    Levaquin (Short breath)  penicillin (Swelling)    Intolerances        Vital Signs Last 24 Hrs  T(C): 36.6 (24 Feb 2018 08:54), Max: 37 (23 Feb 2018 22:02)  T(F): 97.8 (24 Feb 2018 08:54), Max: 98.6 (23 Feb 2018 22:02)  HR: 73 (24 Feb 2018 08:54) (71 - 85)  BP: 100/66 (24 Feb 2018 08:54) (94/61 - 115/69)  BP(mean): --  RR: 18 (24 Feb 2018 08:54) (17 - 18)  SpO2: 95% (24 Feb 2018 08:54) (94% - 99%)    PHYSICAL EXAM  General: adult in NAD  HEENT: clear oropharynx, icteric sclera, pink conjunctiva  Neck: supple  CV: normal S1/S2 with no murmur rubs or gallops  Lungs: positive air movement b/l ant lungs,clear to auscultation, no wheezes, no rales  Abdomen: softly distended,  non-tender   Ext: no clubbing cyanosis or edema  Skin: no rashes and no petechiae  Neuro: lethargic, arousible.  no focal deficits  LABS:                          8.7    7.4   )-----------( 38       ( 24 Feb 2018 07:33 )             27.1         Mean Cell Volume : 118.0 fl  Mean Cell Hemoglobin : 37.9 pg  Mean Cell Hemoglobin Concentration : 32.1 gm/dL  Auto Neutrophil # : x  Auto Lymphocyte # : x  Auto Monocyte # : x  Auto Eosinophil # : x  Auto Basophil # : x  Auto Neutrophil % : x  Auto Lymphocyte % : x  Auto Monocyte % : x  Auto Eosinophil % : x  Auto Basophil % : x      02-24    147<H>  |  101  |  126<H>  ----------------------------<  136<H>  3.6   |  24  |  2.50<H>    Ca    9.7      24 Feb 2018 07:33    TPro  5.4<L>  /  Alb  3.8  /  TBili  10.3<H>  /  DBili  x   /  AST  88<H>  /  ALT  46<H>  /  AlkPhos  156<H>  02-24      Lactate Dehydrogenase, Serum: 397 U/L (02-24 @ 07:33)  Reticulocyte Percent: 4.1 % (02-24 @ 07:33)    Misc. hematology 02-24 @ 07:33  4.1 %  --

## 2018-02-24 NOTE — PROGRESS NOTE ADULT - SUBJECTIVE AND OBJECTIVE BOX
Follow-up Pulm Progress Note    The patient was seen and examined. Notes reviewed and discussed with staff/team as applicable      No new respiratory events overnight. Sitting in chair. Daughter by bedside. Very lethargic. Did not recognize me.      Denies: increased SOB, Chest pain, increased cough, colored phlegm, hemoptysis, N/V/D, neck stiffness, dysuria      Vital Signs Last 24 Hrs  T(C): 36.6 (2018 08:54), Max: 37 (2018 22:02)  T(F): 97.8 (2018 08:54), Max: 98.6 (2018 22:02)  HR: 73 (2018 08:54) (71 - 85)  BP: 100/66 (2018 08:54) (94/61 - 115/69)  BP(mean): --  RR: 18 (2018 08:54) (16 - 18)  SpO2: 95% (2018 08:54) (94% - 99%)     @ 07:01  -  -24 @ 07:00  --------------------------------------------------------  IN: 720 mL / OUT: 100 mL / NET: 620 mL    Medications:  MEDICATIONS  (STANDING):  ALBUTerol/ipratropium for Nebulization 3 milliLiter(s) Nebulizer every 6 hours  buDESOnide   0.5 milliGRAM(s) Respule 0.5 milliGRAM(s) Inhalation every 12 hours  cefepime  IVPB 1000 milliGRAM(s) IV Intermittent every 12 hours  dextrose 5%. 1000 milliLiter(s) (50 mL/Hr) IV Continuous <Continuous>  dextrose 50% Injectable 12.5 Gram(s) IV Push once  dextrose 50% Injectable 25 Gram(s) IV Push once  dextrose 50% Injectable 25 Gram(s) IV Push once  docusate sodium 100 milliGRAM(s) Oral three times a day  escitalopram 20 milliGRAM(s) Oral daily  insulin glargine Injectable (LANTUS) 32 Unit(s) SubCutaneous at bedtime  insulin lispro (HumaLOG) corrective regimen sliding scale   SubCutaneous every 6 hours  nystatin Powder 1 Application(s) Topical two times a day  pantoprazole    Tablet 40 milliGRAM(s) Oral before breakfast  predniSONE   Tablet 50 milliGRAM(s) Oral daily  propranolol 10 milliGRAM(s) Oral daily  sodium chloride 0.9%. 1000 milliLiter(s) (50 mL/Hr) IV Continuous <Continuous>    MEDICATIONS  (PRN):  dextrose Gel 1 Dose(s) Oral once PRN Blood Glucose LESS THAN 70 milliGRAM(s)/deciliter  glucagon  Injectable 1 milliGRAM(s) IntraMuscular once PRN Glucose LESS THAN 70 milligrams/deciliter  ondansetron Injectable 4 milliGRAM(s) IV Push every 6 hours PRN Nausea and/or Vomiting  polyethylene glycol 3350 17 Gram(s) Oral at bedtime PRN Constipation  senna 2 Tablet(s) Oral at bedtime PRN Constipation  sodium chloride 0.65% Nasal 1 Spray(s) Both Nostrils daily PRN Nasal Congestion      Allergies    Levaquin (Short breath)  penicillin (Swelling)      Physical Examination:    icteric, ill appearing, cachectic female, opens eyes to stimuli  Neck: no JVD, LAD, accessory muscle use  PULM: Decreased BS bases without wheezing  CVS: Regular rate and rhythm, S1S2, no murmurs, rubs, or gallops  Abdomen: firm, slight distended, non tender  Extremities: +edema, and anasarca  Neuro: lethargic      LABS:                        8.7    7.4   )-----------( 38       ( 2018 07:33 )             27.1     02-24    147<H>  |  101  |  126<H>  ----------------------------<  136<H>  3.6   |  24  |  2.50<H>    Ca    9.7      2018 07:33    TPro  5.4<L>  /  Alb  3.8  /  TBili  10.3<H>  /  DBili  x   /  AST  88<H>  /  ALT  46<H>  /  AlkPhos  156<H>  02-24          CAPILLARY BLOOD GLUCOSE      POCT Blood Glucose.: 109 mg/dL (2018 07:02)      Urinalysis Basic - ( 2018 16:39 )    Color: Yellow / Appearance: Clear / S.012 / pH: x  Gluc: x / Ketone: Negative  / Bili: Negative / Urobili: Negative   Blood: x / Protein: Trace / Nitrite: Negative   Leuk Esterase: Negative / RBC: 0-2 /HPF / WBC 2-5 /HPF   Sq Epi: x / Non Sq Epi: Occasional /HPF / Bacteria: Few /HPF

## 2018-02-24 NOTE — PROGRESS NOTE ADULT - SUBJECTIVE AND OBJECTIVE BOX
CARDIOLOGY FOLLOW UP NOTE - DR. PALACIO    Subjective:    more lethargic, not eating much   no obvious cp, inc sob    PHYSICAL EXAM:  T(C): 36.6 (02-24-18 @ 08:54), Max: 37 (02-23-18 @ 22:02)  HR: 73 (02-24-18 @ 08:54) (71 - 85)  BP: 100/66 (02-24-18 @ 08:54) (94/61 - 115/69)  RR: 18 (02-24-18 @ 08:54) (16 - 18)  SpO2: 95% (02-24-18 @ 08:54) (94% - 99%)  Wt(kg): --  I&O's Summary    23 Feb 2018 07:01  -  24 Feb 2018 07:00  --------------------------------------------------------  IN: 720 mL / OUT: 100 mL / NET: 620 mL        Appearance: Normal	+ jaundice  Cardiovascular: Normal S1 S2,RRR, No JVD, No murmurs  Respiratory: Lungs clear to auscultation	  Gastrointestinal:  Soft, Non-tender, + BS	  Extremities: Normal range of motion, b/l edema    MEDICATIONS  (STANDING):  ALBUTerol/ipratropium for Nebulization 3 milliLiter(s) Nebulizer every 6 hours  buDESOnide   0.5 milliGRAM(s) Respule 0.5 milliGRAM(s) Inhalation every 12 hours  cefepime  IVPB 1000 milliGRAM(s) IV Intermittent every 12 hours  dextrose 5%. 1000 milliLiter(s) (50 mL/Hr) IV Continuous <Continuous>  dextrose 50% Injectable 12.5 Gram(s) IV Push once  dextrose 50% Injectable 25 Gram(s) IV Push once  dextrose 50% Injectable 25 Gram(s) IV Push once  docusate sodium 100 milliGRAM(s) Oral three times a day  escitalopram 20 milliGRAM(s) Oral daily  insulin glargine Injectable (LANTUS) 32 Unit(s) SubCutaneous at bedtime  insulin lispro (HumaLOG) corrective regimen sliding scale   SubCutaneous every 6 hours  nystatin Powder 1 Application(s) Topical two times a day  pantoprazole    Tablet 40 milliGRAM(s) Oral before breakfast  predniSONE   Tablet 50 milliGRAM(s) Oral daily  propranolol 10 milliGRAM(s) Oral daily      TELEMETRY: 	    ECG:  	  RADIOLOGY:   DIAGNOSTIC TESTING:  [ ] Echocardiogram:  [ ] Catheterization:  [ ] Stress Test:    OTHER: 	    LABS:	 	    CARDIAC MARKERS:                                8.7    7.4   )-----------( 38       ( 24 Feb 2018 07:33 )             27.1     02-24    147<H>  |  101  |  126<H>  ----------------------------<  136<H>  3.6   |  24  |  2.50<H>    Ca    9.7      24 Feb 2018 07:33    TPro  5.4<L>  /  Alb  3.8  /  TBili  10.3<H>  /  DBili  x   /  AST  88<H>  /  ALT  46<H>  /  AlkPhos  156<H>  02-24    proBNP:     Lipid Profile:   HgA1c:

## 2018-02-24 NOTE — PROGRESS NOTE ADULT - SUBJECTIVE AND OBJECTIVE BOX
Patient seen and examined  sitting in the chair  denies any sob or cp  is lethargic      Levaquin (Short breath)  penicillin (Swelling)    Hospital Medications:   MEDICATIONS  (STANDING):  albumin human 25% IVPB 100 milliLiter(s) IV Intermittent every 6 hours  ALBUTerol/ipratropium for Nebulization 3 milliLiter(s) Nebulizer every 6 hours  buDESOnide   0.5 milliGRAM(s) Respule 0.5 milliGRAM(s) Inhalation every 12 hours  cefepime  IVPB 1000 milliGRAM(s) IV Intermittent every 12 hours  dextrose 5%. 1000 milliLiter(s) (50 mL/Hr) IV Continuous <Continuous>  dextrose 50% Injectable 12.5 Gram(s) IV Push once  dextrose 50% Injectable 25 Gram(s) IV Push once  dextrose 50% Injectable 25 Gram(s) IV Push once  docusate sodium 100 milliGRAM(s) Oral three times a day  escitalopram 20 milliGRAM(s) Oral daily  insulin glargine Injectable (LANTUS) 32 Unit(s) SubCutaneous at bedtime  insulin lispro (HumaLOG) corrective regimen sliding scale   SubCutaneous every 6 hours  midodrine 10 milliGRAM(s) Oral every 8 hours  nystatin Powder 1 Application(s) Topical two times a day  pantoprazole    Tablet 40 milliGRAM(s) Oral before breakfast  predniSONE   Tablet 50 milliGRAM(s) Oral daily      VITALS:  T(F): 97.8 (18 @ 08:54), Max: 98.6 (18 @ 22:02)  HR: 73 (18 @ 08:54)  BP: 100/66 (18 @ 08:54)  RR: 18 (18 @ 08:54)  SpO2: 95% (18 @ 08:54)  Wt(kg): --     @ 07:01  -   @ 07:00  --------------------------------------------------------  IN: 720 mL / OUT: 100 mL / NET: 620 mL          PHYSICAL EXAM:  Constitutional: NAD  HEENT: icteric sclera,  Neck: No JVD  Respiratory:b/l rhonchi  Cardiovascular: S1, S2, RRR  Gastrointestinal: BS+, soft, NT, obese  Extremities: 4+ pitting edema b/l  Neurological: A/O x 2      LABS:      147<H>  |  101  |  126<H>  ----------------------------<  136<H>  3.6   |  24  |  2.50<H>    Ca    9.7      2018 07:33    TPro  5.4<L>  /  Alb  3.8  /  TBili  10.3<H>  /  DBili      /  AST  88<H>  /  ALT  46<H>  /  AlkPhos  156<H>      Creatinine Trend: 2.50 <--, 2.43 <--, 2.42 <--, 2.17 <--, 1.86 <--, 1.82 <--, 1.94 <--                        8.7    7.4   )-----------( 38       ( 2018 07:33 )             27.1     Urine Studies:  Urinalysis Basic - ( 2018 16:39 )    Color: Yellow / Appearance: Clear / S.012 / pH:   Gluc:  / Ketone: Negative  / Bili: Negative / Urobili: Negative   Blood:  / Protein: Trace / Nitrite: Negative   Leuk Esterase: Negative / RBC: 0-2 /HPF / WBC 2-5 /HPF   Sq Epi:  / Non Sq Epi: Occasional /HPF / Bacteria: Few /HPF      Creatinine, Random Urine: 54 mg/dL ( @ 18:59)  Protein/Creatinine Ratio Calculation: 0.6 Ratio ( @ 18:59)  Osmolality, Random Urine: 354 mos/kg ( @ 16:55)  Chloride, Random Urine: <35 mmol/L ( @ 16:51)  Potassium, Random Urine: 51 mmol/L ( @ 16:51)  Sodium, Random Urine: <20 mmol/L ( @ 16:51)  Creatinine, Random Urine: 58 mg/dL ( @ 16:51)  Sodium, Random Urine: <20 mmol/L ( @ 06:40)  Chloride, Random Urine: <35 mmol/L ( @ 06:40)  Potassium, Random Urine: 44 mmol/L ( @ 06:40)  Creatinine, Random Urine: 127 mg/dL ( @ 06:40)    RADIOLOGY & ADDITIONAL STUDIES:

## 2018-02-24 NOTE — PROGRESS NOTE ADULT - ASSESSMENT
· Assessment		  73 yo female with PMH of breast cancer with mets to lung, liver, bones, s/p mastectomy, s/p depression, HTN, HLD, T2DM, presents with SOB and LE swelling.  Pneumonia/ Mucus plug- continue empiric antibiotics.  Pulmonary follow.  ID follow         ·  Problem: CHF exacerbation.   monitor ins/outs and daily weight   cardiology follow Dr. Amaral        ·  Problem: PIERRE (acute kidney injury).  Plan: baseline creatinine likely 0.8  unclear etiology of PIERRE,   will monitor BMP  Nephrology follow Dr. Taylor noted.  s/p supplement albumin/ Lasix      ·  Problem: Elevated LFTs.  Plan: likely due to metastasis  no biliary obstruction seen on CT scan  monitor LFTs     Problem: Type 2 diabetes mellitus.  sliding scale      ·  Problem: Depression.  Plan: escitalopram.     ·  Problem: Thrombocytopenia.  Plan: platelet was 89 on 2/1/18  ?chemo induced  will monitor for now.       ·  Problem: Prophylactic measure.  Plan: no AC at this time, due to rapidly worsening thrombocytopenia  monitor platelet for now.    Metastatic breast cancer-  s/p treatment with Solinis.     Order for DNR/DNI placed.  Constipation- bowel regimen  Nausea control  Urinary retention- Peters  Prognosis guarded.  If no improvement will ask Palliative eval re GOC.    Rick Jacobo MD pager [phone]

## 2018-02-25 DIAGNOSIS — R06.00 DYSPNEA, UNSPECIFIED: ICD-10-CM

## 2018-02-25 DIAGNOSIS — Z51.5 ENCOUNTER FOR PALLIATIVE CARE: ICD-10-CM

## 2018-02-25 DIAGNOSIS — C50.919 MALIGNANT NEOPLASM OF UNSPECIFIED SITE OF UNSPECIFIED FEMALE BREAST: ICD-10-CM

## 2018-02-25 LAB
ANION GAP SERPL CALC-SCNC: 31 MMOL/L — HIGH (ref 5–17)
BUN SERPL-MCNC: 144 MG/DL — HIGH (ref 7–23)
CALCIUM SERPL-MCNC: 10.3 MG/DL — SIGNIFICANT CHANGE UP (ref 8.4–10.5)
CHLORIDE SERPL-SCNC: 99 MMOL/L — SIGNIFICANT CHANGE UP (ref 96–108)
CO2 SERPL-SCNC: 17 MMOL/L — LOW (ref 22–31)
CREAT SERPL-MCNC: 2.73 MG/DL — HIGH (ref 0.5–1.3)
GAS PNL BLDA: SIGNIFICANT CHANGE UP
GLUCOSE BLDC GLUCOMTR-MCNC: 135 MG/DL — HIGH (ref 70–99)
GLUCOSE BLDC GLUCOMTR-MCNC: 138 MG/DL — HIGH (ref 70–99)
GLUCOSE SERPL-MCNC: 131 MG/DL — HIGH (ref 70–99)
HCT VFR BLD CALC: 25.5 % — LOW (ref 34.5–45)
HGB BLD-MCNC: 8.5 G/DL — LOW (ref 11.5–15.5)
INR BLD: 3.75 RATIO — HIGH (ref 0.88–1.16)
MCHC RBC-ENTMCNC: 33.4 GM/DL — SIGNIFICANT CHANGE UP (ref 32–36)
MCHC RBC-ENTMCNC: 39.3 PG — HIGH (ref 27–34)
MCV RBC AUTO: 118 FL — HIGH (ref 80–100)
PLATELET # BLD AUTO: 44 K/UL — LOW (ref 150–400)
POTASSIUM SERPL-MCNC: 4.1 MMOL/L — SIGNIFICANT CHANGE UP (ref 3.5–5.3)
POTASSIUM SERPL-SCNC: 4.1 MMOL/L — SIGNIFICANT CHANGE UP (ref 3.5–5.3)
PROTHROM AB SERPL-ACNC: 41.6 SEC — HIGH (ref 9.8–12.7)
RBC # BLD: 2.16 M/UL — LOW (ref 3.8–5.2)
RBC # FLD: 25.4 % — HIGH (ref 10.3–14.5)
SODIUM SERPL-SCNC: 147 MMOL/L — HIGH (ref 135–145)
WBC # BLD: 10.3 K/UL — SIGNIFICANT CHANGE UP (ref 3.8–10.5)
WBC # FLD AUTO: 10.3 K/UL — SIGNIFICANT CHANGE UP (ref 3.8–10.5)

## 2018-02-25 PROCEDURE — 99223 1ST HOSP IP/OBS HIGH 75: CPT | Mod: GC

## 2018-02-25 PROCEDURE — 71045 X-RAY EXAM CHEST 1 VIEW: CPT | Mod: 26

## 2018-02-25 RX ORDER — ALBUMIN HUMAN 25 %
250 VIAL (ML) INTRAVENOUS EVERY 6 HOURS
Qty: 0 | Refills: 0 | Status: COMPLETED | OUTPATIENT
Start: 2018-02-25 | End: 2018-02-26

## 2018-02-25 RX ORDER — ACETAMINOPHEN 500 MG
650 TABLET ORAL EVERY 6 HOURS
Qty: 0 | Refills: 0 | Status: DISCONTINUED | OUTPATIENT
Start: 2018-02-25 | End: 2018-02-27

## 2018-02-25 RX ORDER — VANCOMYCIN HCL 1 G
1000 VIAL (EA) INTRAVENOUS ONCE
Qty: 0 | Refills: 0 | Status: COMPLETED | OUTPATIENT
Start: 2018-02-25 | End: 2018-02-25

## 2018-02-25 RX ORDER — PANTOPRAZOLE SODIUM 20 MG/1
40 TABLET, DELAYED RELEASE ORAL DAILY
Qty: 0 | Refills: 0 | Status: DISCONTINUED | OUTPATIENT
Start: 2018-02-25 | End: 2018-02-27

## 2018-02-25 RX ORDER — HYDROMORPHONE HYDROCHLORIDE 2 MG/ML
0.5 INJECTION INTRAMUSCULAR; INTRAVENOUS; SUBCUTANEOUS
Qty: 0 | Refills: 0 | Status: DISCONTINUED | OUTPATIENT
Start: 2018-02-25 | End: 2018-02-27

## 2018-02-25 RX ORDER — DEXAMETHASONE 0.5 MG/5ML
4 ELIXIR ORAL EVERY 12 HOURS
Qty: 0 | Refills: 0 | Status: DISCONTINUED | OUTPATIENT
Start: 2018-02-25 | End: 2018-02-27

## 2018-02-25 RX ADMIN — NYSTATIN CREAM 1 APPLICATION(S): 100000 CREAM TOPICAL at 06:03

## 2018-02-25 RX ADMIN — Medication 125 MILLIGRAM(S): at 10:15

## 2018-02-25 RX ADMIN — Medication 3 MILLILITER(S): at 06:02

## 2018-02-25 RX ADMIN — CEFEPIME 100 MILLIGRAM(S): 1 INJECTION, POWDER, FOR SOLUTION INTRAMUSCULAR; INTRAVENOUS at 21:49

## 2018-02-25 RX ADMIN — Medication 125 MILLILITER(S): at 18:48

## 2018-02-25 RX ADMIN — Medication 60 MILLIGRAM(S): at 06:03

## 2018-02-25 RX ADMIN — PANTOPRAZOLE SODIUM 40 MILLIGRAM(S): 20 TABLET, DELAYED RELEASE ORAL at 06:03

## 2018-02-25 RX ADMIN — CEFEPIME 100 MILLIGRAM(S): 1 INJECTION, POWDER, FOR SOLUTION INTRAMUSCULAR; INTRAVENOUS at 06:03

## 2018-02-25 RX ADMIN — Medication 3 MILLILITER(S): at 17:31

## 2018-02-25 RX ADMIN — Medication 3 MILLILITER(S): at 10:17

## 2018-02-25 RX ADMIN — NYSTATIN CREAM 1 APPLICATION(S): 100000 CREAM TOPICAL at 17:31

## 2018-02-25 RX ADMIN — MIDODRINE HYDROCHLORIDE 10 MILLIGRAM(S): 2.5 TABLET ORAL at 06:03

## 2018-02-25 RX ADMIN — Medication 250 MILLIGRAM(S): at 13:32

## 2018-02-25 RX ADMIN — Medication 0.5 MILLIGRAM(S): at 06:02

## 2018-02-25 RX ADMIN — Medication 100 MILLIGRAM(S): at 06:03

## 2018-02-25 RX ADMIN — Medication 4 MILLIGRAM(S): at 17:31

## 2018-02-25 RX ADMIN — Medication 50 MILLILITER(S): at 06:33

## 2018-02-25 RX ADMIN — Medication 50 MILLIGRAM(S): at 06:03

## 2018-02-25 NOTE — CHART NOTE - NSCHARTNOTEFT_GEN_A_CORE
Hypothermia    Notified at 0618 patient had a rectal temp of 93.8. Pt in no acute distress not toxic in appearance.      Vital Signs Last 24 Hrs  T(C): 34.3 (25 Feb 2018 05:41), Max: 36.8 (24 Feb 2018 21:05)  T(F): 93.8 (25 Feb 2018 05:41), Max: 98.3 (24 Feb 2018 21:05)  HR: 79 (25 Feb 2018 05:41) (73 - 79)  BP: 129/75 (25 Feb 2018 05:41) (100/66 - 129/75)  BP(mean): --  RR: 18 (25 Feb 2018 05:41) (17 - 18)  SpO2: 95% (25 Feb 2018 05:41) (95% - 96%)    MEDICATIONS  (STANDING):  albumin human 25% IVPB 100 milliLiter(s) IV Intermittent every 6 hours  ALBUTerol/ipratropium for Nebulization 3 milliLiter(s) Nebulizer every 6 hours  buDESOnide   0.5 milliGRAM(s) Respule 0.5 milliGRAM(s) Inhalation every 12 hours  cefepime  IVPB 1000 milliGRAM(s) IV Intermittent every 12 hours  dextrose 5%. 1000 milliLiter(s) (50 mL/Hr) IV Continuous <Continuous>  dextrose 50% Injectable 12.5 Gram(s) IV Push once  dextrose 50% Injectable 25 Gram(s) IV Push once  dextrose 50% Injectable 25 Gram(s) IV Push once  docusate sodium 100 milliGRAM(s) Oral three times a day  escitalopram 20 milliGRAM(s) Oral daily  furosemide   Injectable 60 milliGRAM(s) IV Push daily  insulin glargine Injectable (LANTUS) 32 Unit(s) SubCutaneous at bedtime  insulin lispro (HumaLOG) corrective regimen sliding scale   SubCutaneous every 6 hours  midodrine 10 milliGRAM(s) Oral every 8 hours  nystatin Powder 1 Application(s) Topical two times a day  pantoprazole    Tablet 40 milliGRAM(s) Oral before breakfast  predniSONE   Tablet 50 milliGRAM(s) Oral daily    MEDICATIONS  (PRN):  dextrose Gel 1 Dose(s) Oral once PRN Blood Glucose LESS THAN 70 milliGRAM(s)/deciliter  glucagon  Injectable 1 milliGRAM(s) IntraMuscular once PRN Glucose LESS THAN 70 milligrams/deciliter  ondansetron Injectable 4 milliGRAM(s) IV Push every 6 hours PRN Nausea and/or Vomiting  polyethylene glycol 3350 17 Gram(s) Oral at bedtime PRN Constipation  senna 2 Tablet(s) Oral at bedtime PRN Constipation  sodium chloride 0.65% Nasal 1 Spray(s) Both Nostrils daily PRN Nasal Congestion    Plan  Hyperthermia [place]    Will endorse to primary team, attending to follow    Hazel Rivera NP  spectralink [phone]

## 2018-02-25 NOTE — PROGRESS NOTE ADULT - SUBJECTIVE AND OBJECTIVE BOX
Follow-up Pulm Progress Note    The patient was seen and examined. Notes reviewed and discussed with staff/team as applicable      Pt with continued lethargy, now stuporous, with hypothermia overnight and worsening respiratory status. RRT called this am. CXR ABG done.     ROS unable to perform    Vital Signs Last 24 Hrs  T(C): 36.2 (2018 09:00), Max: 36.8 (2018 21:05)  T(F): 97.2 (2018 09:00), Max: 98.3 (2018 21:05)  HR: 111 (2018 09:59) (79 - 111)  BP: 121/64 (2018 08:30) (121/64 - 129/75)  BP(mean): --  RR: 26 (2018 09:59) (17 - 26)  SpO2: 86% (2018 09:59) (86% - 98%)           @ 07:01  -   @ 07:00  --------------------------------------------------------  IN: 490 mL / OUT: 150 mL / NET: 340 mL          Medications:  MEDICATIONS  (STANDING):  albumin human 25% IVPB 100 milliLiter(s) IV Intermittent every 6 hours  ALBUTerol/ipratropium for Nebulization 3 milliLiter(s) Nebulizer every 6 hours  buDESOnide   0.5 milliGRAM(s) Respule 0.5 milliGRAM(s) Inhalation every 12 hours  cefepime  IVPB 1000 milliGRAM(s) IV Intermittent every 12 hours  dextrose 5%. 1000 milliLiter(s) (50 mL/Hr) IV Continuous <Continuous>  dextrose 50% Injectable 12.5 Gram(s) IV Push once  dextrose 50% Injectable 25 Gram(s) IV Push once  dextrose 50% Injectable 25 Gram(s) IV Push once  docusate sodium 100 milliGRAM(s) Oral three times a day  escitalopram 20 milliGRAM(s) Oral daily  furosemide   Injectable 60 milliGRAM(s) IV Push daily  insulin glargine Injectable (LANTUS) 32 Unit(s) SubCutaneous at bedtime  insulin lispro (HumaLOG) corrective regimen sliding scale   SubCutaneous every 6 hours  methylPREDNISolone sodium succinate Injectable 125 milliGRAM(s) IV Push once  midodrine 10 milliGRAM(s) Oral every 8 hours  nystatin Powder 1 Application(s) Topical two times a day  pantoprazole    Tablet 40 milliGRAM(s) Oral before breakfast  predniSONE   Tablet 50 milliGRAM(s) Oral daily  vancomycin  IVPB 1000 milliGRAM(s) IV Intermittent once    MEDICATIONS  (PRN):  dextrose Gel 1 Dose(s) Oral once PRN Blood Glucose LESS THAN 70 milliGRAM(s)/deciliter  glucagon  Injectable 1 milliGRAM(s) IntraMuscular once PRN Glucose LESS THAN 70 milligrams/deciliter  ondansetron Injectable 4 milliGRAM(s) IV Push every 6 hours PRN Nausea and/or Vomiting  polyethylene glycol 3350 17 Gram(s) Oral at bedtime PRN Constipation  senna 2 Tablet(s) Oral at bedtime PRN Constipation  sodium chloride 0.65% Nasal 1 Spray(s) Both Nostrils daily PRN Nasal Congestion      Allergies    Levaquin (Short breath)  penicillin (Swelling)    Physical Examination:    Stuporous, agonal appearing, jaundice/icteric female. On O2 FM  Neck: no JVD, LAD, accessory muscle use  PULM: diffuse rhonchi. Decreased at bases  CVS: Regular rate and rhythm, S1S2, no murmurs, rubs, or gallops  Abdomen: soft, anasarca  Extremities: anasarca, jaundice  Neuro: stuporous      LABS:                        8.7    7.4   )-----------( 38       ( 2018 07:33 )             27.1     02-24    147<H>  |  101  |  126<H>  ----------------------------<  136<H>  3.6   |  24  |  2.50<H>    Ca    9.7      2018 07:33    TPro  5.4<L>  /  Alb  3.8  /  TBili  10.3<H>  /  DBili  x   /  AST  88<H>  /  ALT  46<H>  /  AlkPhos  156<H>  02-24          CAPILLARY BLOOD GLUCOSE      POCT Blood Glucose.: 135 mg/dL (2018 10:07)      Urinalysis Basic - ( 2018 16:39 )    Color: Yellow / Appearance: Clear / S.012 / pH: x  Gluc: x / Ketone: Negative  / Bili: Negative / Urobili: Negative   Blood: x / Protein: Trace / Nitrite: Negative   Leuk Esterase: Negative / RBC: 0-2 /HPF / WBC 2-5 /HPF   Sq Epi: x / Non Sq Epi: Occasional /HPF / Bacteria: Few /HPF

## 2018-02-25 NOTE — CHART NOTE - NSCHARTNOTEFT_GEN_A_CORE
71 yo female with PMH of breast cancer with mets to lung, liver, bones, s/p mastectomy,  currently on chemo (last chemo 2 days ago), DNR/DNI,  depression, HTN, HLD, T2DM, initially presented to hospital for worsening SOB/LE swelling currently being diuresed. RRT called for worsening resp status.     Pt was seen to be tachypneic and saturating 90% on nasal cannula, uptitrated to NRB. Pt also tachy in low 100s, noted to be diffusely wheezing. received solumedrol 125 and duonebs x 1. No previous history of COPD or asthma. May be reactive airway disease? Pt was placed on BIPAP for resp distress/tachypnea. Code status noted.     Last CT chest 2/18 Moderate bilateral pleural effusions with bilateral lower and upper lobe   atelectasis. Left lower lobe atelectasis may be secondary to a left lower lobe bronchus mucous plug.  Patchy ground glass opacities in the right upper lobe and left lower lobe may be secondary to infection.    Per primary team, patient was also hypothermic last night and placed on heating blanket. However, not cultured.     Pt currently septic. Ddx for worsening resp status: worsening pna vs. new aspiration pna vs. mucous plug vs. worsening CHF vs. PE (not on AC due to thrombocytopenia) vs. chemo induced    Would need to evaluate for sepsis    -cxray, cbc, cmp, abg with lytes and lactate, and blood cultures, RVP  -check urinalysis for complete sepsis workup  -BIPAP as needed. duonebs q6h standing  -pt needs extensive GOC conversation with family as pt with overall poor prognosis.     Dr. Jacobo and pulm attending at bedside. Family informed by Dr. Jacobo. Palliative consult placed.     Kavon Dorsey, PGY3  MAR  [phone]

## 2018-02-25 NOTE — CONSULT NOTE ADULT - SUBJECTIVE AND OBJECTIVE BOX
HPI:  71 yo female with PMH of breast cancer with mets to lung, liver, bones, s/p mastectomy, s/p depression, HTN, HLD, T2DM, presents with SOB and LE swelling.  Patient has been doing well except last 2-3 weeks had SOB and LE edema.  She also had cough, for which she was started on prednisone.  Currently cough has completely resolved.  She was initially started on lasix 2 pills of 20mg daily, which was decreased to 1 pill/daily two weeks ago because patient has been urinating too much.  Over the last 2 weeks, she has been becoming progressively more weak.  Last three days she had worsening of her LE edema to the point that she can't ambulate well.  She can't walk more than a few feet without having dyspnea.  She now has to sleep on a recliner.  She denies PND.  She also has unsteady gait, had recent MRI brain which was apparently negative.  She admits to drinking 3-4 bottles of water and 1-2 cans of gingerale daily.  She denies any chest pain, dizziness, blurry vision, cough, fever, urinary or bowel changes. (2018 21:46)    Pt with complicated course.  Now with liver and renal failure.  This am pt with RRT.  Now unresponsive on bipap.  Family at bedside.        PERTINENT PMH REVIEWED:  [ x] YES [ ] NO           SOCIAL HISTORY:   Significant other/partner:  [x ] YES  [ ] NO               Children:  [ x] YES  [ ] NO                   Mormon/Spirituality:  Substance hx:  [ ] YES   [ x] NO                   Tobacco hx:  [ ] YES  [x ] NO                       Alcohol hx: [ ] YES  [x ] NO         Home Opioid hx:  [ ] YES  [x ] NO   Living Situation: [x ] Home  [ ] Long term care  [ ] Rehab [ ] Other    FAMILY HISTORY:  FAMILY HISTORY:  Family history of breast cancer (Father)    [x ] Family history non-contributory     BASELINE (I)ADLs (prior to admission):  [place]: [x ] total  [ ] moderate [ ] dependent    ADVANCE DIRECTIVES:    DNR [x ] YES [ ] NO                            [ ] Completed  MOLST  [ ] YES [ ] NO                      [ ] Completed  Health Care Proxy [ ] YES  [ ] NO   [ ] Completed  Living Will  [ ] YES [ ] NO             [ ] Surrogate  [ ] HCP  [ ] Guardian:                      - see emr                                            Phone#:    ALLERGIES:   Allergies    Levaquin (Short breath)  penicillin (Swelling)    Intolerances        MEDICATIONS:   MEDICATIONS  (STANDING):  albumin human  5% IVPB 250 milliLiter(s) IV Intermittent every 6 hours  ALBUTerol/ipratropium for Nebulization 3 milliLiter(s) Nebulizer every 6 hours  cefepime  IVPB 1000 milliGRAM(s) IV Intermittent every 12 hours  dexamethasone  Injectable 4 milliGRAM(s) IV Push every 12 hours  dextrose 5%. 1000 milliLiter(s) (50 mL/Hr) IV Continuous <Continuous>  furosemide   Injectable 60 milliGRAM(s) IV Push daily  nystatin Powder 1 Application(s) Topical two times a day  pantoprazole  Injectable 40 milliGRAM(s) IV Push daily    MEDICATIONS  (PRN):  acetaminophen  Suppository 650 milliGRAM(s) Rectal every 6 hours PRN For Temp greater than 38.5 C (101.3 F)  HYDROmorphone  Injectable 0.5 milliGRAM(s) IV Push every 2 hours PRN dyspnea  HYDROmorphone  Injectable 0.5 milliGRAM(s) IV Push every 2 hours PRN pain  LORazepam   Injectable 0.5 milliGRAM(s) IV Push every 2 hours PRN Agitation  ondansetron Injectable 4 milliGRAM(s) IV Push every 6 hours PRN Nausea and/or Vomiting  sodium chloride 0.65% Nasal 1 Spray(s) Both Nostrils daily PRN Nasal Congestion      PRESENT SYMPTOMS:  Source: [ ] Patient   [ x] Family   [ x] Team     Pain:                        [ x] No [ ] Yes             [ ] Mild [ ] Moderate [ ] Severe    Onset -  Location -  Duration -  Character -  Alleviating/Aggravating -  Radiation -  Timing -      Dyspnea:                [ x] No [ ] Yes             [ ] Mild [ ] Moderate [ ] Severe    Anxiety:                  [x ] No [ ] Yes             [ ] Mild [ ] Moderate [ ] Severe    Fatigue:                  [ ] No [x ] Yes             [ ] Mild [ ] Moderate [x ] Severe    Nausea:                  [ x] No [ ] Yes             [ ] Mild [ ] Moderate [ ] Severe    Loss of appetite:   [ ] No [ x] Yes             [ ] Mild [ ] Moderate [x ] Severe    Constipation:        [ xNo [ ] Yes             [ ] Mild [ ] Moderate [ ] Severe    Other Symptoms:  [ ] All other review of systems negative   [x Unable to obtain due to poor mentation     Karnofsky Performance Score/Palliative Performance Status Version 2:   20      %    PHYSICAL EXAM:  Vital Signs Last 24 Hrs  T(C): 36.2 (2018 09:00), Max: 36.8 (2018 21:05)  T(F): 97.2 (2018 09:00), Max: 98.3 (2018 21:05)  HR: 90 (2018 10:40) (79 - 111)  BP: 121/64 (2018 08:30) (121/64 - 129/75)  BP(mean): --  RR: 26 (2018 09:59) (17 - 26)  SpO2: 99% (2018 10:40) (86% - 99%) I&O's Summary    2018 07:01  -  2018 07:00  --------------------------------------------------------  IN: 490 mL / OUT: 150 mL / NET: 340 mL    2018 07:01  -  2018 14:16  --------------------------------------------------------  IN: 50 mL / OUT: 0 mL / NET: 50 mL        General:  [ ] Alert  [ ] Oriented x      [x ] Lethargic  [ ] Agitated   [ ] Cachexia   [ x] Unarousable  [ ] Verbal  [ x] Non-Verbal    HEENT:  [ ] Normal   [ ] Dry mouth   [ ] ET Tube    [ ] Trach  [ ] Oral lesions  +bipap    Lungs:   [ x] Clear [ ] Tachypnea  [ ] Audible excessive secretions   [ ] Rhonchi        [ ] Right [ ] Left [ ] Bilateral  [ ] Crackles        [ ] Right [ ] Left [ ] Bilateral  [ ] Wheezing     [ ] Right [ ] Left [ ] Bilateral    Cardiovascular:  [ ] Regular [ ] Irregular [ x] Tachycardia   [ ] Bradycardia  [ ] Murmur [ ] Other    Abdomen: [ x] Soft  [x ] Distended   [ x] +BS  [ ] Non tender [ ] Tender  [ ]PEG   [ ]OGT/ NGT   Last BM:       Genitourinary: [ ] Normal [x ] Incontinent   [ ] Oliguria/Anuria   [ ] Peters    Musculoskeletal:  [ ] Normal   [ ] Weakness  [x ] Bedbound/Wheelchair bound [ ] Edema    Neurological: [ ] No focal deficits  [ ] Cognitive impairment  [ ] Dysphagia [ ] Dysarthria [ ] Paresis [ x] Other +encephalopathy    Skin: [ ] Normal   [ ] Pressure ulcer(s)                  [ ] Rash   +jaundice    LABS:  [ ] Critical Care time for unstable patient with organ failure.  Total Time:                 minutes      147<H>  |  99  |  144<H>  ----------------------------<  131<H>  4.1   |  17<L>  |  2.73<H>    Ca    10.3      2018 10:39    TPro  5.4<L>  /  Alb  3.8  /  TBili  10.3<H>  /  DBili  x   /  AST  88<H>  /  ALT  46<H>  /  AlkPhos  156<H>      PT/INR - ( 2018 10:39 )   PT: 41.6 sec;   INR: 3.75 ratio           Urinalysis Basic - ( 2018 16:39 )    Color: Yellow / Appearance: Clear / S.012 / pH: x  Gluc: x / Ketone: Negative  / Bili: Negative / Urobili: Negative   Blood: x / Protein: Trace / Nitrite: Negative   Leuk Esterase: Negative / RBC: 0-2 /HPF / WBC 2-5 /HPF   Sq Epi: x / Non Sq Epi: Occasional /HPF / Bacteria: Few /HPF        Shock: [ ] Septic [ ] Cardiogenic [ ] Neurologic [ ] Hypovolemic  Vasopressors x   Inotrophs x     Protein Calorie Malnutrition: [ ] Mild [ ] Moderate [ ] Severe  Oral Intake: [ ] Unable/mouth care only [ ] Minimal [ ] Moderate [ ] Full Capability  Diet: [ ] NPO [ ] Tube feeds [ ] TPN [ ] Other     RADIOLOGY & ADDITIONAL STUDIES:    REFERRALS:   [ ] Chaplaincy  [ ] Hospice  [ ] Child Life  [ ] Social Work  [ ] Case management [ ] Holistic Therapy

## 2018-02-25 NOTE — PROGRESS NOTE ADULT - PROBLEM SELECTOR PLAN 1
Pre Renal vs ATN ( gemcitabine /TMA ) vs HRS.   Differentials include multifactorial  pt on albumin for intravascular depletion  On midodrine for hypotension  On lasix for volume overload   Discussed with family length about prognosis; Explained about declining renal fxn. Informed other modalities including hemodialysis which would not change over all prognosis; Family wishes NOT to pursue HD; will continue to tx medically  s/p ecluzimab  bladder scan showing 200 cc urine- will ask to place carty  dose all meds to gfr <20  prognosis very poor

## 2018-02-25 NOTE — PROGRESS NOTE ADULT - ASSESSMENT
· Assessment		  71 yo female with PMH of breast cancer with mets to lung, liver, bones, s/p mastectomy, s/p depression, HTN, HLD, T2DM, presents with SOB and LE swelling. S/P RRT  Pneumonia/ Mucus plug- continue empiric antibiotics, nebs, steroids. Add Vancomycin.  Panculture.  BIPAP  Pulmonary follow.  ID follow         ·  Problem: CHF exacerbation.   monitor ins/outs and daily weight   cardiology follow Dr. Amaral        ·  Problem: PIERRE (acute kidney injury).  Plan: baseline creatinine likely 0.8  will monitor BMP  Nephrology follow   s/p supplement albumin/ Lasix      ·  Problem: Elevated LFTs.  Plan: likely due to metastasis  no biliary obstruction seen on CT scan  monitor LFTs     Problem: Type 2 diabetes mellitus.  sliding scale      ·  Problem: Depression.  Plan: escitalopram.     ·  Problem: Thrombocytopenia.  Plan: platelet was 89 on 2/1/18  ?chemo induced  will monitor for now.       ·  Problem: Prophylactic measure.  Plan: no AC at this time, due to rapidly worsening thrombocytopenia  monitor platelet for now.    Metastatic breast cancer-  s/p treatment with Solinis.     Order for DNR/DNI placed.  Constipation- bowel regimen  Nausea control  Urinary retention- Peters  Prognosis poor.  Palliative evaluation pending.   called and situation reviewed with him.     Rick Jacobo MD pager [phone]

## 2018-02-25 NOTE — PROVIDER CONTACT NOTE (OTHER) - BACKGROUND
admitted w/ SOB and cough and diagnosed w/ pna. pt. w/ pmh breat ca and mets to liver, lung, and bone.

## 2018-02-25 NOTE — PROGRESS NOTE ADULT - ASSESSMENT
failure to thrive  metastatic breast CA  SIRS/septic picture with respiratory failure, hypothermia.    PLAN/RECOMMENDATIONS    oxygen supplementation to keep saturation greater than 92%  Trial BIPAP while awaiting palliative input. Pt is DNR  empiric cefepime, Vanco  albuterol/atrovent/pulmicort nebs as able  prednisone 50mg daily. Solumedrol given  patient is not a candidate for bronchoscopy given poor overall prognosis    Pt. DNR/I. Seems terminal. D/w team    F/u CXR re: reversible etiologies.    Juan Nelson MD  Pulmonary Medicine  [phone]

## 2018-02-25 NOTE — PROGRESS NOTE ADULT - ASSESSMENT
71 yo female with advanced breast cancer, dyspnea, failure to thrive, and possible pneumonia.  She has extensive liver mets, ascites, and restrictive lung disease, multiple reasons for dyspnea and anorexia  Patchy GG opacities on CT, possible infection, although difficult to be specific   Afebrile, normal WBC  Ascites fluid Cx negative  Liver and renal dysfx most limiting; concern for HUS noted- Creat and Bili still on rise  S/p Soliris and Mening vaccine  As noted, palliative care eval; Dr Perea's input appreciated    Plan:  Last day of empiric Cefepime directed at possible pneumonia  Doubt any abx will impact outcome, poor prognosis

## 2018-02-25 NOTE — PROVIDER CONTACT NOTE (OTHER) - ACTION/TREATMENT ORDERED:
Hazel Rivera NP made aware, heat lamp placed on pt, hyperthermic blanket ordered and placed on pt, goal temp 97F Hazel Rivera NP made aware, heat lamp placed on pt, hyperthermic blanket ordered and placed on pt, check temp q30 mins until goal temp 97F

## 2018-02-25 NOTE — PROGRESS NOTE ADULT - SUBJECTIVE AND OBJECTIVE BOX
Pt is seen and examined  pt is lethargic, responds to painful stimuli.   and lying in bed/  In respiratory disress, on bipap        MEDICATIONS  (STANDING):  albumin human 25% IVPB 100 milliLiter(s) IV Intermittent every 6 hours  ALBUTerol/ipratropium for Nebulization 3 milliLiter(s) Nebulizer every 6 hours  buDESOnide   0.5 milliGRAM(s) Respule 0.5 milliGRAM(s) Inhalation every 12 hours  cefepime  IVPB 1000 milliGRAM(s) IV Intermittent every 12 hours  dextrose 5%. 1000 milliLiter(s) (50 mL/Hr) IV Continuous <Continuous>  dextrose 50% Injectable 12.5 Gram(s) IV Push once  dextrose 50% Injectable 25 Gram(s) IV Push once  dextrose 50% Injectable 25 Gram(s) IV Push once  docusate sodium 100 milliGRAM(s) Oral three times a day  escitalopram 20 milliGRAM(s) Oral daily  furosemide   Injectable 60 milliGRAM(s) IV Push daily  insulin glargine Injectable (LANTUS) 32 Unit(s) SubCutaneous at bedtime  insulin lispro (HumaLOG) corrective regimen sliding scale   SubCutaneous every 6 hours  methylPREDNISolone sodium succinate Injectable 125 milliGRAM(s) IV Push once  midodrine 10 milliGRAM(s) Oral every 8 hours  nystatin Powder 1 Application(s) Topical two times a day  pantoprazole    Tablet 40 milliGRAM(s) Oral before breakfast  predniSONE   Tablet 50 milliGRAM(s) Oral daily  vancomycin  IVPB 1000 milliGRAM(s) IV Intermittent once    MEDICATIONS  (PRN):  dextrose Gel 1 Dose(s) Oral once PRN Blood Glucose LESS THAN 70 milliGRAM(s)/deciliter  glucagon  Injectable 1 milliGRAM(s) IntraMuscular once PRN Glucose LESS THAN 70 milligrams/deciliter  ondansetron Injectable 4 milliGRAM(s) IV Push every 6 hours PRN Nausea and/or Vomiting  polyethylene glycol 3350 17 Gram(s) Oral at bedtime PRN Constipation  senna 2 Tablet(s) Oral at bedtime PRN Constipation  sodium chloride 0.65% Nasal 1 Spray(s) Both Nostrils daily PRN Nasal Congestion      Allergies    Levaquin (Short breath)  penicillin (Swelling)    Intolerances        Vital Signs Last 24 Hrs  T(C): 36.2 (25 Feb 2018 09:00), Max: 36.8 (24 Feb 2018 21:05)  T(F): 97.2 (25 Feb 2018 09:00), Max: 98.3 (24 Feb 2018 21:05)  HR: 90 (25 Feb 2018 10:40) (79 - 111)  BP: 121/64 (25 Feb 2018 08:30) (121/64 - 129/75)  BP(mean): --  RR: 26 (25 Feb 2018 09:59) (17 - 26)  SpO2: 99% (25 Feb 2018 10:40) (86% - 99%)    PHYSICAL EXAM  General: adult, lethargic, moderate respiratory distress  HEENT:, icteric sclera, pink conjunctiva  CV: normal S1/S2 with no murmur rubs or gallops  Lungs: bilateral wheezes and rhonchi  Abdomen: soft non-tender non-distended, no hepatosplenomegaly  Ext:2+ edema of legs  Skin: no rashes and no petechiae  Neuro: lethargic, minimally arousable, responding to pain. no focal deficits  LABS:                          8.5    10.3  )-----------( 44       ( 25 Feb 2018 10:39 )             25.5         Mean Cell Volume : 118.0 fl  Mean Cell Hemoglobin : 39.3 pg  Mean Cell Hemoglobin Concentration : 33.4 gm/dL  Auto Neutrophil # : x  Auto Lymphocyte # : x  Auto Monocyte # : x  Auto Eosinophil # : x  Auto Basophil # : x  Auto Neutrophil % : x  Auto Lymphocyte % : x  Auto Monocyte % : x  Auto Eosinophil % : x  Auto Basophil % : x      02-24    147<H>  |  101  |  126<H>  ----------------------------<  136<H>  3.6   |  24  |  2.50<H>    Ca    9.7      24 Feb 2018 07:33    TPro  5.4<L>  /  Alb  3.8  /  TBili  10.3<H>  /  DBili  x   /  AST  88<H>  /  ALT  46<H>  /  AlkPhos  156<H>  02-24      Lactate Dehydrogenase, Serum: 397 U/L (02-24 @ 07:33)  Reticulocyte Percent: 4.1 % (02-24 @ 07:33)      Misc. hematology 02-24 @ 07:33  4.1 %  --  --

## 2018-02-25 NOTE — PROGRESS NOTE ADULT - SUBJECTIVE AND OBJECTIVE BOX
CARDIOLOGY FOLLOW UP NOTE - DR. PALACIO      Subjective:    events noted  on bipap    PHYSICAL EXAM:  T(C): 36.2 (02-25-18 @ 09:00), Max: 36.8 (02-24-18 @ 21:05)  HR: 90 (02-25-18 @ 10:40) (79 - 111)  BP: 121/64 (02-25-18 @ 08:30) (121/64 - 129/75)  RR: 26 (02-25-18 @ 09:59) (17 - 26)  SpO2: 99% (02-25-18 @ 10:40) (86% - 99%)  Wt(kg): --  I&O's Summary    24 Feb 2018 07:01  -  25 Feb 2018 07:00  --------------------------------------------------------  IN: 490 mL / OUT: 150 mL / NET: 340 mL    25 Feb 2018 07:01  -  25 Feb 2018 12:23  --------------------------------------------------------  IN: 50 mL / OUT: 0 mL / NET: 50 mL        Appearance: Normal	  Cardiovascular: Normal S1 S2,RRR, No JVD, No murmurs  Respiratory: rhonchi b/l  Gastrointestinal:  Soft, Non-tender, + BS	  Extremities: Normal range of motion, edema    MEDICATIONS  (STANDING):  ALBUTerol/ipratropium for Nebulization 3 milliLiter(s) Nebulizer every 6 hours  cefepime  IVPB 1000 milliGRAM(s) IV Intermittent every 12 hours  dexamethasone  Injectable 4 milliGRAM(s) IV Push every 12 hours  dextrose 5%. 1000 milliLiter(s) (50 mL/Hr) IV Continuous <Continuous>  furosemide   Injectable 60 milliGRAM(s) IV Push daily  insulin lispro (HumaLOG) corrective regimen sliding scale   SubCutaneous every 6 hours  nystatin Powder 1 Application(s) Topical two times a day  pantoprazole  Injectable 40 milliGRAM(s) IV Push daily  vancomycin  IVPB 1000 milliGRAM(s) IV Intermittent once      TELEMETRY: 	    ECG:  	  RADIOLOGY:   DIAGNOSTIC TESTING:  [ ] Echocardiogram:  [ ] Catheterization:  [ ] Stress Test:    OTHER: 	    LABS:	 	    CARDIAC MARKERS:                                8.5    10.3  )-----------( 44       ( 25 Feb 2018 10:39 )             25.5     02-25    147<H>  |  99  |  144<H>  ----------------------------<  131<H>  4.1   |  17<L>  |  2.73<H>    Ca    10.3      25 Feb 2018 10:39    TPro  5.4<L>  /  Alb  3.8  /  TBili  10.3<H>  /  DBili  x   /  AST  88<H>  /  ALT  46<H>  /  AlkPhos  156<H>  02-24    proBNP:   PT/INR - ( 25 Feb 2018 10:39 )   PT: 41.6 sec;   INR: 3.75 ratio           Lipid Profile:   HgA1c:

## 2018-02-25 NOTE — PROGRESS NOTE ADULT - ASSESSMENT
72 year old female with breast cancer with mets to lung, liver, bones, s/p mastectomy, s/p depression, HTN, HLD, T2DM admitted with LE edema/ SOB     1. events noted  2. Respiratory distress/SOB  multifactorial and secondary to pulm disease, met lung disease, volume overload  echo revealing normal lv sys fx   VQ scan revealing low probability of PE   BIPAP  palliative eval   nebulizers  on iv lasix per renal     3. LE edema   likely secondary to hypoalbuminemia / extensive mets disease   s/p albumin   med/ onc / renal f/u    4. htn   off propranolol   started on midodrine for bP support  5. elevated LFTS  gi f/u   s/p paracentesis  with replacement of albumin  6. PIERRE   creat worsening   renal f/u  7. encephalopathy  likely secondary to overall medical condition, po intake, hepatic/uremic issues  await palliative care eval   poor prognosis    dvt ppx

## 2018-02-25 NOTE — PROGRESS NOTE ADULT - SUBJECTIVE AND OBJECTIVE BOX
Patient seen and examined  lying in bed on bipap  lethargic  was hypothermic overnight    Levaquin (Short breath)  penicillin (Swelling)    Hospital Medications:   MEDICATIONS  (STANDING):  ALBUTerol/ipratropium for Nebulization 3 milliLiter(s) Nebulizer every 6 hours  cefepime  IVPB 1000 milliGRAM(s) IV Intermittent every 12 hours  dexamethasone  Injectable 4 milliGRAM(s) IV Push every 12 hours  dextrose 5%. 1000 milliLiter(s) (50 mL/Hr) IV Continuous <Continuous>  furosemide   Injectable 60 milliGRAM(s) IV Push daily  nystatin Powder 1 Application(s) Topical two times a day  pantoprazole  Injectable 40 milliGRAM(s) IV Push daily  vancomycin  IVPB 1000 milliGRAM(s) IV Intermittent once        VITALS:  T(F): 97.2 (18 @ 09:00), Max: 98.3 (18 @ 21:05)  HR: 90 (18 @ 10:40)  BP: 121/64 (18 @ 08:30)  RR: 26 (18 @ 09:59)  SpO2: 99% (18 @ 10:40)  Wt(kg): --     @ 07:01  -   @ 07:00  --------------------------------------------------------  IN: 490 mL / OUT: 150 mL / NET: 340 mL     @ 07:01  -   @ 13:30  --------------------------------------------------------  IN: 50 mL / OUT: 0 mL / NET: 50 mL          PHYSICAL EXAM:  Constitutional: lethargic-- on bipap  HEENT: icteric sclera,  Neck: No JVD  Respiratory:b/l rhonchi  Cardiovascular: S1, S2, RRR  Gastrointestinal: BS+, soft, NT, obese  Extremities: 4+ pitting edema b/l  Neurological: A/O x 1    LABS:      147<H>  |  99  |  144<H>  ----------------------------<  131<H>  4.1   |  17<L>  |  2.73<H>    Ca    10.3      2018 10:39    TPro  5.4<L>  /  Alb  3.8  /  TBili  10.3<H>  /  DBili      /  AST  88<H>  /  ALT  46<H>  /  AlkPhos  156<H>      Creatinine Trend: 2.73 <--, 2.50 <--, 2.43 <--, 2.42 <--, 2.17 <--, 1.86 <--, 1.82 <--                        8.5    10.3  )-----------( 44       ( 2018 10:39 )             25.5     Urine Studies:  Urinalysis Basic - ( 2018 16:39 )    Color: Yellow / Appearance: Clear / S.012 / pH:   Gluc:  / Ketone: Negative  / Bili: Negative / Urobili: Negative   Blood:  / Protein: Trace / Nitrite: Negative   Leuk Esterase: Negative / RBC: 0-2 /HPF / WBC 2-5 /HPF   Sq Epi:  / Non Sq Epi: Occasional /HPF / Bacteria: Few /HPF      Creatinine, Random Urine: 54 mg/dL ( @ 18:59)  Protein/Creatinine Ratio Calculation: 0.6 Ratio ( @ 18:59)  Osmolality, Random Urine: 354 mos/kg ( @ 16:55)  Chloride, Random Urine: <35 mmol/L ( @ 16:51)  Potassium, Random Urine: 51 mmol/L ( @ 16:51)  Sodium, Random Urine: <20 mmol/L ( @ 16:51)  Creatinine, Random Urine: 58 mg/dL ( @ 16:51)    RADIOLOGY & ADDITIONAL STUDIES:

## 2018-02-25 NOTE — CONSULT NOTE ADULT - PROBLEM SELECTOR RECOMMENDATION 3
bipap as tolerated  lasix   decadron  o2 support  most likely due to pt's multi organ failure but family would like to try to use bipap for now

## 2018-02-25 NOTE — CONSULT NOTE ADULT - PROBLEM SELECTOR RECOMMENDATION 9
x many years s/p chemo last received gemsar  overall prognosis is poor and at this time is not a candidate for dmt

## 2018-02-25 NOTE — CONSULT NOTE ADULT - PROVIDER SPECIALTY LIST ADULT
Cardiology
Gastroenterology
Heme/Onc
Nephrology
Palliative Care
Pulmonology
Infectious Disease
Neurology

## 2018-02-25 NOTE — PROGRESS NOTE ADULT - PROBLEM SELECTOR PLAN 4
- received soliris Friday- repeat in one week if improves.  - s/p meningococcus vaccine  - follow daily cbc, ldh, retic, cmp for 2-3 d

## 2018-02-25 NOTE — PROGRESS NOTE ADULT - SUBJECTIVE AND OBJECTIVE BOX
CC: f/u for possible pneumonia    Patient remains quite weak, slow to arouse, debilitated, poor po intake, jaundiced    REVIEW OF SYSTEMS:  limited    Antimicrobials Day # 7  cefepime  IVPB 1000 milliGRAM(s) IV Intermittent every 12 hours    Other Medications Reviewed    Vital Signs Last 24 Hrs  T(F): 97.2 (25 Feb 2018 09:00), Max: 98.3 (24 Feb 2018 21:05)  HR: 90 (25 Feb 2018 10:40) (79 - 111)  BP: 121/64 (25 Feb 2018 08:30) (121/64 - 129/75)  BP(mean): --  RR: 26 (25 Feb 2018 09:59) (17 - 26)  SpO2: 99% (25 Feb 2018 10:40) (86% - 99%)    PHYSICAL EXAM:  General: no acute distress, jaundiced  Eyes:  icteric, no conjunctival injection, no discharge  Oropharynx: no lesions or injection 	  Neck: supple, without adenopathy  L Mediport site clean  Lungs: diminished bases  Heart: regular rate and rhythm; no murmur, rubs or gallops  Abdomen: soft, nondistended, nontender, without mass or organomegaly  Skin: no lesions  Extremities: leg edema  Neurologic: somnolent, slow to arouse    LAB RESULTS:                        8.5    10.3  )-----------( 44       ( 25 Feb 2018 10:39 )             25.5   02-25    147<H>  |  99  |  144<H>  ----------------------------<  131<H>  4.1   |  17<L>  |  2.73<H>    Ca    10.3      25 Feb 2018 10:39    TPro  5.4<L>  /  Alb  3.8  /  TBili  10.3<H>  /  DBili  x   /  AST  88<H>  /  ALT  46<H>  /  AlkPhos  156<H>  02-24      MICROBIOLOGY:  RECENT CULTURES:  02-16 @ 19:03 .Body Fluid Peritoneal Fluid     No growth    Rapid Respiratory Viral Panel (02.20.18 @ 10:38)    Rapid RVP Result: [facility]      RADIOLOGY REVIEWED:  CT Chest No Cont (02.19.18 @ 11:27) >  Moderate bilateral pleural effusions with bilateral lower and upper lobe   atelectasis. Left lower lobe atelectasis may be secondary to a left lower   lobe bronchus mucous plug.    Patchy groundglass opacities in the right upper lobe and left lower lobe   may be secondary to infection.    Hepatic and blastic osseous metastases.

## 2018-02-25 NOTE — PROGRESS NOTE ADULT - PROBLEM SELECTOR PLAN 1
--- mets to liver, bone  --- s/p gemzar  -- prognosis poor  palliative consult in progress  Discussed with Medicine and Pulmonary

## 2018-02-25 NOTE — CONSULT NOTE ADULT - PROBLEM SELECTOR RECOMMENDATION 2
s/p solaris  receiving albumin x 24hrs more  family does not want hd as it will not change underlying medical condition and will prolong pt's suffering  will check labs in am to see if pt is improving

## 2018-02-25 NOTE — CONSULT NOTE ADULT - PROBLEM SELECTOR RECOMMENDATION 4
pt is dnr/dni  family meeting with , 3 adult children and their significant others  goals are to maximize comfort  they are aware that pt is dying but struggling with quick decline  pt to be transferred to pcu for private room so family can be with pt 24 hours  emotional support provided   called  child life called for grandchildren

## 2018-02-26 VITALS — OXYGEN SATURATION: 97 % | RESPIRATION RATE: 16 BRPM | HEART RATE: 59 BPM | TEMPERATURE: 95 F

## 2018-02-26 DIAGNOSIS — A41.9 SEPSIS, UNSPECIFIED ORGANISM: ICD-10-CM

## 2018-02-26 LAB
-  COAGULASE NEGATIVE STAPHYLOCOCCUS: SIGNIFICANT CHANGE UP
ALBUMIN SERPL ELPH-MCNC: 4.7 G/DL — SIGNIFICANT CHANGE UP (ref 3.3–5)
ALP SERPL-CCNC: 121 U/L — HIGH (ref 40–120)
ALT FLD-CCNC: 60 U/L — HIGH (ref 10–45)
ANION GAP SERPL CALC-SCNC: 35 MMOL/L — HIGH (ref 5–17)
AST SERPL-CCNC: 104 U/L — HIGH (ref 10–40)
BILIRUB SERPL-MCNC: 13.5 MG/DL — HIGH (ref 0.2–1.2)
BUN SERPL-MCNC: 160 MG/DL — HIGH (ref 7–23)
CALCIUM SERPL-MCNC: 10.3 MG/DL — SIGNIFICANT CHANGE UP (ref 8.4–10.5)
CHLORIDE SERPL-SCNC: 101 MMOL/L — SIGNIFICANT CHANGE UP (ref 96–108)
CO2 SERPL-SCNC: 13 MMOL/L — LOW (ref 22–31)
CREAT SERPL-MCNC: 3.71 MG/DL — HIGH (ref 0.5–1.3)
GLUCOSE SERPL-MCNC: 146 MG/DL — HIGH (ref 70–99)
GRAM STN FLD: SIGNIFICANT CHANGE UP
HCT VFR BLD CALC: 23.7 % — LOW (ref 34.5–45)
HGB BLD-MCNC: 7.6 G/DL — LOW (ref 11.5–15.5)
MCHC RBC-ENTMCNC: 32.1 GM/DL — SIGNIFICANT CHANGE UP (ref 32–36)
MCHC RBC-ENTMCNC: 37.6 PG — HIGH (ref 27–34)
MCV RBC AUTO: 117.3 FL — HIGH (ref 80–100)
METHOD TYPE: SIGNIFICANT CHANGE UP
NRBC # BLD: 4 /100 WBCS — HIGH (ref 0–0)
PLATELET # BLD AUTO: 35 K/UL — LOW (ref 150–400)
POTASSIUM SERPL-MCNC: 4.8 MMOL/L — SIGNIFICANT CHANGE UP (ref 3.5–5.3)
POTASSIUM SERPL-SCNC: 4.8 MMOL/L — SIGNIFICANT CHANGE UP (ref 3.5–5.3)
PROT SERPL-MCNC: 5.8 G/DL — LOW (ref 6–8.3)
RBC # BLD: 2.02 M/UL — LOW (ref 3.8–5.2)
RBC # FLD: 28.4 % — HIGH (ref 10.3–14.5)
SODIUM SERPL-SCNC: 149 MMOL/L — HIGH (ref 135–145)
WBC # BLD: 7.21 K/UL — SIGNIFICANT CHANGE UP (ref 3.8–10.5)
WBC # FLD AUTO: 7.21 K/UL — SIGNIFICANT CHANGE UP (ref 3.8–10.5)

## 2018-02-26 PROCEDURE — 99233 SBSQ HOSP IP/OBS HIGH 50: CPT | Mod: GC

## 2018-02-26 RX ADMIN — Medication 60 MILLIGRAM(S): at 06:25

## 2018-02-26 RX ADMIN — Medication 4 MILLIGRAM(S): at 17:15

## 2018-02-26 RX ADMIN — CEFEPIME 100 MILLIGRAM(S): 1 INJECTION, POWDER, FOR SOLUTION INTRAMUSCULAR; INTRAVENOUS at 09:46

## 2018-02-26 RX ADMIN — Medication 3 MILLILITER(S): at 17:15

## 2018-02-26 RX ADMIN — Medication 3 MILLILITER(S): at 06:24

## 2018-02-26 RX ADMIN — Medication 3 MILLILITER(S): at 12:04

## 2018-02-26 RX ADMIN — HYDROMORPHONE HYDROCHLORIDE 0.5 MILLIGRAM(S): 2 INJECTION INTRAMUSCULAR; INTRAVENOUS; SUBCUTANEOUS at 07:55

## 2018-02-26 RX ADMIN — HYDROMORPHONE HYDROCHLORIDE 0.5 MILLIGRAM(S): 2 INJECTION INTRAMUSCULAR; INTRAVENOUS; SUBCUTANEOUS at 07:40

## 2018-02-26 RX ADMIN — Medication 4 MILLIGRAM(S): at 06:24

## 2018-02-26 RX ADMIN — Medication 125 MILLILITER(S): at 06:25

## 2018-02-26 RX ADMIN — Medication 0.5 MILLIGRAM(S): at 00:42

## 2018-02-26 RX ADMIN — Medication 3 MILLILITER(S): at 00:22

## 2018-02-26 RX ADMIN — Medication 125 MILLILITER(S): at 00:22

## 2018-02-26 RX ADMIN — Medication 125 MILLILITER(S): at 12:48

## 2018-02-26 RX ADMIN — PANTOPRAZOLE SODIUM 40 MILLIGRAM(S): 20 TABLET, DELAYED RELEASE ORAL at 12:04

## 2018-02-26 RX ADMIN — CEFEPIME 100 MILLIGRAM(S): 1 INJECTION, POWDER, FOR SOLUTION INTRAMUSCULAR; INTRAVENOUS at 21:23

## 2018-02-26 RX ADMIN — NYSTATIN CREAM 1 APPLICATION(S): 100000 CREAM TOPICAL at 17:15

## 2018-02-26 RX ADMIN — NYSTATIN CREAM 1 APPLICATION(S): 100000 CREAM TOPICAL at 06:24

## 2018-02-26 NOTE — PROGRESS NOTE ADULT - PROBLEM SELECTOR PLAN 2
- due to liver mets
-- likely combination of liver disease from met breast ca and gemcitabine chemotherapy  -- last levels done 2/14 -- will repeat in am
- due to liver mets
-- likely adding to pt's sob - (?) mucous plug  -- will s/w pulmonary re: further therapy  -- (?) if family agreeable to bronch ---
-- santino Renal input  -- continue albumin  -- (?) low dose diuretics
--- due to liver mets
--- due to liver mets
Poss related to Gemzar; will check ldh/haptoglobin/retic to see if element of HUS from gemcitabine    As per family history, weakness has become problem over last 2 weeks after last dose of gemcitabine with edema, diff ambulating; treatment of gemzar toxicity is stopping drug and supportive care with diuretics, etc.
has effusins and anasarca  Ulytes had shown intravascular depletion  on albumin now  lasix 60mg iv q daily  will monitor
needs albumin for intravascular repletion but given clinical status and ct chest findings will also c/w lasix 60mg iv bid  monitor closely
+ blood cultures reported today for gram + cocci in aerobic and anaerobic bottles, on maxipime, still has vancomycin in system due to renal function (received vanc 1g 2/25)  will d/w family about following troughs/ getting blood work.
had effusins and anasarca  Ulytes had shown intravascular depletion  s/p albumin  given volume overload state - c/w lasix as above  monitor closely
had effusins and anasarca  Ulytes had shown intravascular depletion  s/p albumin  given volume overload state and rise in cr- dec lasix to 60mg IV QD
had effusins and anasarca  Ulytes had shown intravascular depletion  s/p albumin  on lasix to 60mg IV BID for volume overload  will monitor
has effusins and anasarca  Ulytes had shown intravascular depletion  on albumin now  lasix 60mg iv q daily  will monitor
has effusins and anasarca  s/p albumin  lasix prn  will monitor
needs albumin for intravascular repletion but given clinical status and ct chest findings will also start lasix 60mg iv bid  monitor closely
off diuretics  start albumin  plan for paracentsis  will monitor
off diuretics for now  c/w albumin  will monitor

## 2018-02-26 NOTE — PROGRESS NOTE ADULT - PROBLEM SELECTOR PROBLEM 4
HUS (hemolytic uremic syndrome)
Edema
Elevated LFTs
HUS (hemolytic uremic syndrome)
Type 2 diabetes mellitus
Type 2 diabetes mellitus
Dyspnea, unspecified type

## 2018-02-26 NOTE — PROGRESS NOTE ADULT - PROBLEM SELECTOR PLAN 4
Bipap as tolerated, lasix stopped due to low BP (systolic 80s), Patient has progressing multi organ failure and patient primarily comfortable on NC o2

## 2018-02-26 NOTE — PROGRESS NOTE ADULT - ATTENDING COMMENTS
Agree with above NP note.  cv stable  cont current tx
Agree with above NP note.  cv stable  lasix on hold   med f/u  cont current tx
Jose Heath MD, FACP, FACG, AGAF  [facility]  [phone]
agree with above NP note.  cv stable  lasix for volume overload  monitor renal fxn
agree with the above assessment and plan by NOEMI Mares.  cont IV lasix
Agree with above NP note.  cv stable  lasix iv  creat increasing   cont to monitor   dec lasix if creat cont to increase
Jose Heath MD, FACP, FACG, AGAF  [facility]  [phone]
Jose Heath MD, FACP, FACG, AGAF  [facility]  [phone]
Pt seen with fellow.  Agree with above. Pt actively dying.  Goals are for comfort and support to family/ pt.  Chaplaincy and child life called

## 2018-02-26 NOTE — PROVIDER CONTACT NOTE (CRITICAL VALUE NOTIFICATION) - BACKGROUND
Breast cancer with mets to lung, liver, bones, s/p mastectomy, depression , HTN, HLD, DM II
Breast cancer mets to lung, liver, bones, s/p mastectomy, depression, HTN, HLD, DM II
breast ca w/ mets to liver, lung, and bone

## 2018-02-26 NOTE — PROGRESS NOTE ADULT - PROBLEM SELECTOR PROBLEM 2
Elevated LFTs
Elevated LFTs
Atelectasis of left lung
Edema
Elevated LFTs
PIERRE (acute kidney injury)
Edema
Sepsis

## 2018-02-26 NOTE — PROGRESS NOTE ADULT - SUBJECTIVE AND OBJECTIVE BOX
Patient is a 72y old  Female who presents with a chief complaint of shortness of breath (13 Feb 2018 21:46)      SUBJECTIVE / OVERNIGHT EVENTS: lethargic. Family at bedside.   Review of Systems  unobtainable     MEDICATIONS  (STANDING):  ALBUTerol/ipratropium for Nebulization 3 milliLiter(s) Nebulizer every 6 hours  cefepime  IVPB 1000 milliGRAM(s) IV Intermittent every 12 hours  dexamethasone  Injectable 4 milliGRAM(s) IV Push every 12 hours  dextrose 5%. 1000 milliLiter(s) (50 mL/Hr) IV Continuous <Continuous>  nystatin Powder 1 Application(s) Topical two times a day  pantoprazole  Injectable 40 milliGRAM(s) IV Push daily    MEDICATIONS  (PRN):  acetaminophen  Suppository 650 milliGRAM(s) Rectal every 6 hours PRN For Temp greater than 38.5 C (101.3 F)  HYDROmorphone  Injectable 0.5 milliGRAM(s) IV Push every 2 hours PRN dyspnea  HYDROmorphone  Injectable 0.5 milliGRAM(s) IV Push every 2 hours PRN pain  LORazepam   Injectable 0.5 milliGRAM(s) IV Push every 2 hours PRN Agitation  ondansetron Injectable 4 milliGRAM(s) IV Push every 6 hours PRN Nausea and/or Vomiting  sodium chloride 0.65% Nasal 1 Spray(s) Both Nostrils daily PRN Nasal Congestion          PHYSICAL EXAM:  GENERAL: NAD  HEAD:  Atraumatic, Normocephalic  EYES: EOMI, PERRLA, conjunctiva and sclera clear  NECK: Supple, No JVD  CHEST/LUNG: Clear to auscultation bilaterally; No wheeze  HEART: Regular rate and rhythm; No murmurs, rubs, or gallops  ABDOMEN: Soft, Nontender, Nondistended; Bowel sounds present  EXTREMITIES:  2+ Peripheral Pulses, No clubbing, cyanosis,2+ edema  NEUROLOGY: non-focal  SKIN: No rashes or lesions    LABS:                        7.6    7.21  )-----------( 35       ( 26 Feb 2018 09:15 )             23.7     02-26    149<H>  |  101  |  160<H>  ----------------------------<  146<H>  4.8   |  13<L>  |  3.71<H>    Ca    10.3      26 Feb 2018 09:24    TPro  5.8<L>  /  Alb  4.7  /  TBili  13.5<H>  /  DBili  x   /  AST  104<H>  /  ALT  60<H>  /  AlkPhos  121<H>  02-26    PT/INR - ( 25 Feb 2018 10:39 )   PT: 41.6 sec;   INR: 3.75 ratio                   RADIOLOGY & ADDITIONAL TESTS:    Imaging Personally Reviewed:    Consultant(s) Notes Reviewed:      Care Discussed with Consultants/Other Providers:

## 2018-02-26 NOTE — PROGRESS NOTE ADULT - SUBJECTIVE AND OBJECTIVE BOX
HPI:  71 yo female with PMH of breast cancer with mets to lung, liver, bones, s/p mastectomy, s/p depression, HTN, HLD, T2DM, presents with SOB and LE swelling.  Patient has been doing well except last 2-3 weeks had SOB and LE edema.  She also had cough, for which she was started on prednisone and lasix. Over the last 2 weeks, she has been becoming progressively more weak.  Last three days she had worsening of her LE edema to the point that she can't ambulate well.  (2018 21:46)    HOSPITAL COURSE: Pt with complicated course.  Now with liver and renal failure, s/p RRT.  Now unresponsive, does not want bipap,  bedside.      PERTINENT PMH REVIEWED:  [ x] YES [ ] NO           SOCIAL HISTORY:   Significant other/partner:  [x ] YES  [ ] NO               Children:  [ x] YES  [ ] NO                   Advent/Spirituality:  Substance hx:  [ ] YES   [ x] NO                   Tobacco hx:  [ ] YES  [x ] NO                       Alcohol hx: [ ] YES  [x ] NO         Home Opioid hx:  [ ] YES  [x ] NO   Living Situation: [x ] Home  [ ] Long term care  [ ] Rehab [ ] Other    FAMILY HISTORY:  Family history of breast cancer (Father)    [x ] Family history non-contributory     BASELINE (I)ADLs (prior to admission):  [place]: [x ] total  [ ] moderate [ ] dependent    ADVANCE DIRECTIVES:    DNR [x ] YES [ ] NO                            [ ] Completed  MOLST  [ ] YES [ ] NO                      [ ] Completed  Health Care Proxy [ ] YES  [ ] NO   [ ] Completed  Living Will  [ ] YES [ ] NO             [ ] Surrogate  [ ] HCP  [ ] Guardian:                      - see emr        ALLERGIES:   Allergies    Levaquin (Short breath)  penicillin (Swelling)  Intolerances    MEDICATIONS  (STANDING):  ALBUTerol/ipratropium for Nebulization 3 milliLiter(s) Nebulizer every 6 hours  cefepime  IVPB 1000 milliGRAM(s) IV Intermittent every 12 hours  dexamethasone  Injectable 4 milliGRAM(s) IV Push every 12 hours  dextrose 5%. 1000 milliLiter(s) (50 mL/Hr) IV Continuous <Continuous>  nystatin Powder 1 Application(s) Topical two times a day  pantoprazole  Injectable 40 milliGRAM(s) IV Push daily    MEDICATIONS  (PRN):  acetaminophen  Suppository 650 milliGRAM(s) Rectal every 6 hours PRN For Temp greater than 38.5 C (101.3 F)  HYDROmorphone  Injectable 0.5 milliGRAM(s) IV Push every 2 hours PRN dyspnea  HYDROmorphone  Injectable 0.5 milliGRAM(s) IV Push every 2 hours PRN pain  LORazepam   Injectable 0.5 milliGRAM(s) IV Push every 2 hours PRN Agitation  ondansetron Injectable 4 milliGRAM(s) IV Push every 6 hours PRN Nausea and/or Vomiting  sodium chloride 0.65% Nasal 1 Spray(s) Both Nostrils daily PRN Nasal Congestion    PRESENT SYMPTOMS:  Source: [ ] Patient   [ x] Family   [ x] Team     Pain:                        [ x] No [ ] Yes             [ ] Mild [ ] Moderate [ ] Severe    Onset -  Location -  Duration -  Character -  Alleviating/Aggravating -  Radiation -  Timing -      Dyspnea:                [ x] No [ ] Yes             [ ] Mild [ ] Moderate [ ] Severe    Anxiety:                  [x ] No [ ] Yes             [ ] Mild [ ] Moderate [ ] Severe    Fatigue:                  [ ] No [x ] Yes             [ ] Mild [ ] Moderate [x ] Severe    Nausea:                  [ x] No [ ] Yes             [ ] Mild [ ] Moderate [ ] Severe    Loss of appetite:   [ ] No [ x] Yes             [ ] Mild [ ] Moderate [x ] Severe    Constipation:        [ xNo [ ] Yes             [ ] Mild [ ] Moderate [ ] Severe    Other Symptoms:  [ ] All other review of systems negative   [x Unable to obtain due to poor mentation     Karnofsky Performance Score/Palliative Performance Status Version 2:   20      %    PHYSICAL EXAM:  Vital Signs Last 24 Hrs  T(C): 34.8 (2018 10:05), Max: 36.4 (2018 17:08)  T(F): 94.6 (2018 10:05), Max: 97.5 (2018 17:08)  HR: 59 (2018 10:05) (59 - 98)  BP: 82/48 (2018 09:56) (82/48 - 105/70)  BP(mean): --  RR: 16 (2018 10:05) (16 - 18)  SpO2: 97% (2018 10:05) (96% - 100%)        General:  [ ] Alert  [ ] Oriented x      [x ] Lethargic  [ ] Agitated   [ ] Cachexia   [ x] Unarousable  [ ] Verbal  [ x] Non-Verbal    HEENT:  [ ] Normal   [x ] Dry mouth   [ ] ET Tube    [ ] Trach  [ ] Oral lesions     Lungs:   [ x] Clear [ ] Tachypnea  [ ] Audible excessive secretions   [x ] Rhonchi        [ ] Right [ ] Left [ x] Bilateral  [ ] Crackles        [ ] Right [ ] Left [ ] Bilateral  [ ] Wheezing     [ ] Right [ ] Left [ ] Bilateral    Cardiovascular:  [ ] Regular [ ] Irregular [ x] Tachycardia   [ ] Bradycardia  [ ] Murmur [ ] Other    Abdomen: [ x] Soft  [x ] Distended   [ x] +BS  [ ] Non tender [ ] Tender  [ ]PEG   [ ]OGT/ NGT   Last BM:       Genitourinary: [ ] Normal [x ] Incontinent   [ ] Oliguria/Anuria   [ ] Peters    Musculoskeletal:  [ ] Normal   [ ] Weakness  [x ] Bedbound/Wheelchair bound [ ] Edema    Neurological: [ ] No focal deficits  [ ] Cognitive impairment  [ ] Dysphagia [ ] Dysarthria [ ] Paresis [ x] Other +encephalopathy    Skin: [ ] Normal   [ ] Pressure ulcer(s)                  [ ] Rash   +jaundice    LABS: none new interim        147<H>  |  99  |  144<H>  ----------------------------<  131<H>  4.1   |  17<L>  |  2.73<H>    Ca    10.3      2018 10:39    TPro  5.4<L>  /  Alb  3.8  /  TBili  10.3<H>  /  DBili  x   /  AST  88<H>  /  ALT  46<H>  /  AlkPhos  156<H>      PT/INR - ( 2018 10:39 )   PT: 41.6 sec;   INR: 3.75 ratio       Urinalysis Basic - ( 2018 16:39 )    Color: Yellow / Appearance: Clear / S.012 / pH: x  Gluc: x / Ketone: Negative  / Bili: Negative / Urobili: Negative   Blood: x / Protein: Trace / Nitrite: Negative   Leuk Esterase: Negative / RBC: 0-2 /HPF / WBC 2-5 /HPF   Sq Epi: x / Non Sq Epi: Occasional /HPF / Bacteria: Few /HPF    Shock: [ ] Septic [ ] Cardiogenic [ ] Neurologic [ ] Hypovolemic  Vasopressors x   Inotrophs x     Protein Calorie Malnutrition: [ ] Mild [ ] Moderate [ ] Severe  Oral Intake: [ ] Unable/mouth care only [ ] Minimal [ ] Moderate [ ] Full Capability  Diet: [ ] NPO [ ] Tube feeds [ ] TPN [ ] Other     RADIOLOGY & ADDITIONAL STUDIES: none new interim    REFERRALS:   [ ] Chaplaincy  [ ] Hospice  [ ] Child Life  [ ] Social Work  [ ] Case management [ ] Holistic Therapy

## 2018-02-26 NOTE — PROGRESS NOTE ADULT - PROBLEM SELECTOR PLAN 1
--- mets to liver, bone  --- s/p gemzar  -- prognosis poor  palliative consult in progress  Discussed with Medicine and Pulmonary --- mets to liver, bone  --- s/p gemzar  -- prognosis grave in seting of multi organ dysfunction and now with bacteremia  D/w  - palliative approach appropriate at this time  palliative consult in progress  Discussed with Medicine and Pulmonary

## 2018-02-26 NOTE — PROGRESS NOTE ADULT - PROBLEM SELECTOR PLAN 4
- received soliris Friday- repeat in one week if improves.  - s/p meningococcus vaccine  - follow daily cbc, ldh, retic, cmp for 2-3 d - received soliris Friday- repeat in one week if improves.  - s/p meningococcus vaccine  - if overt infection, may not be able to redose soliris  also explained that potrential bacteremia at this time may affect cbc and renal function as well which may affect our ability to assess response

## 2018-02-26 NOTE — PROGRESS NOTE ADULT - PROBLEM SELECTOR PROBLEM 3
Atelectasis of left lung
PIERRE (acute kidney injury)
Atelectasis of left lung
Edema
PIERRE (acute kidney injury)
PIERRE (acute kidney injury)
Pneumonia
Pneumonia
HUS (hemolytic uremic syndrome)

## 2018-02-26 NOTE — PROGRESS NOTE ADULT - PROBLEM SELECTOR PLAN 3
- cont supportive care
- creatinine is stable
- cont supportive care
- creatinine stable
-- cr stable  --
-- santino Pulm input  -- cont antibiotics, supp care
-- santino Pulm input  -- cont antibiotics, supp care
--- as per renal  --- (?) low dose diuretic to aid in fluid removal
on albumin and solaris, declining HD as will prolong patient's suffering.  understanding that patient is in dying process and would like to make her as comfortable as possible, children having a hard time adjusting.

## 2018-02-26 NOTE — PROGRESS NOTE ADULT - PROVIDER SPECIALTY LIST ADULT
Cardiology
Gastroenterology
Heme/Onc
Infectious Disease
Internal Medicine
Intervent Radiology
Nephrology
Neurology
Neurology
Palliative Care
Pulmonology
Cardiology
Infectious Disease
Gastroenterology
Infectious Disease
Pulmonology
Nephrology
Nephrology
Heme/Onc

## 2018-02-26 NOTE — PROGRESS NOTE ADULT - SUBJECTIVE AND OBJECTIVE BOX
Pt is seen and examined  pt is lethargic, responds to painful stimuli.   and lying in bed/  In respiratory disress, on bipap  PHYSICAL EXAM  General: adult, lethargic, moderate respiratory distress  HEENT:, icteric sclera, pink conjunctiva  CV: normal S1/S2 with no murmur rubs or gallops  Lungs: bilateral wheezes and rhonchi  Abdomen: soft non-tender non-distended, no hepatosplenomegaly  Ext:2+ edema of legs  Skin: no rashes and no petechiae  Neuro: lethargic, minimally arousable, responding to pain. no focal deficits        MEDICATIONS  (STANDING):  albumin human  5% IVPB 250 milliLiter(s) IV Intermittent every 6 hours  ALBUTerol/ipratropium for Nebulization 3 milliLiter(s) Nebulizer every 6 hours  cefepime  IVPB 1000 milliGRAM(s) IV Intermittent every 12 hours  dexamethasone  Injectable 4 milliGRAM(s) IV Push every 12 hours  dextrose 5%. 1000 milliLiter(s) (50 mL/Hr) IV Continuous <Continuous>  nystatin Powder 1 Application(s) Topical two times a day  pantoprazole  Injectable 40 milliGRAM(s) IV Push daily    MEDICATIONS  (PRN):  acetaminophen  Suppository 650 milliGRAM(s) Rectal every 6 hours PRN For Temp greater than 38.5 C (101.3 F)  HYDROmorphone  Injectable 0.5 milliGRAM(s) IV Push every 2 hours PRN dyspnea  HYDROmorphone  Injectable 0.5 milliGRAM(s) IV Push every 2 hours PRN pain  LORazepam   Injectable 0.5 milliGRAM(s) IV Push every 2 hours PRN Agitation  ondansetron Injectable 4 milliGRAM(s) IV Push every 6 hours PRN Nausea and/or Vomiting  sodium chloride 0.65% Nasal 1 Spray(s) Both Nostrils daily PRN Nasal Congestion      Allergies    Levaquin (Short breath)  penicillin (Swelling)    Intolerances        Vital Signs Last 24 Hrs  T(C): 34.8 (26 Feb 2018 10:05), Max: 36.4 (25 Feb 2018 17:08)  T(F): 94.6 (26 Feb 2018 10:05), Max: 97.5 (25 Feb 2018 17:08)  HR: 59 (26 Feb 2018 10:05) (59 - 98)  BP: 82/48 (26 Feb 2018 09:56) (82/48 - 105/70)  BP(mean): --  RR: 16 (26 Feb 2018 10:05) (14 - 18)  SpO2: 97% (26 Feb 2018 10:05) (96% - 100%)      LABS:                          7.6    7.21  )-----------( 35       ( 26 Feb 2018 09:15 )             23.7         Mean Cell Volume : 117.3 fl  Mean Cell Hemoglobin : 37.6 pg  Mean Cell Hemoglobin Concentration : 32.1 gm/dL      02-26    149<H>  |  101  |  160<H>  ----------------------------<  146<H>  4.8   |  13<L>  |  3.71<H>    Ca    10.3      26 Feb 2018 09:24    TPro  5.8<L>  /  Alb  4.7  /  TBili  13.5<H>  /  DBili  x   /  AST  104<H>  /  ALT  60<H>  /  AlkPhos  121<H>  02-26      PT/INR - ( 25 Feb 2018 10:39 )   PT: 41.6 sec;   INR: 3.75 ratio                          8.5    10.3  )-----------( 44       ( 25 Feb 2018 10:39 )             25.5         Mean Cell Volume : 118.0 fl  Mean Cell Hemoglobin : 39.3 pg  Mean Cell Hemoglobin Concentration : 33.4 gm/dL  Auto Neutrophil # : x  Auto Lymphocyte # : x  Auto Monocyte # : x  Auto Eosinophil # : x  Auto Basophil # : x  Auto Neutrophil % : x  Auto Lymphocyte % : x  Auto Monocyte % : x  Auto Eosinophil % : x  Auto Basophil % : x      02-24    147<H>  |  101  |  126<H>  ----------------------------<  136<H>  3.6   |  24  |  2.50<H>    Ca    9.7      24 Feb 2018 07:33    TPro  5.4<L>  /  Alb  3.8  /  TBili  10.3<H>  /  DBili  x   /  AST  88<H>  /  ALT  46<H>  /  AlkPhos  156<H>  02-24      Lactate Dehydrogenase, Serum: 397 U/L (02-24 @ 07:33)  Reticulocyte Percent: 4.1 % (02-24 @ 07:33)      Misc. hematology 02-24 @ 07:33  4.1 %  --  -- Pt is seen and examined  pt is lethargic and lying in bed  In respiratory disress  discussion wiuth family at bed side - patient not in overt distress    PHYSICAL EXAM  General: adult, lethargic  HEENT:, icteric sclera, pink conjunctiva  CV: normal S1/S2 with no murmur rubs or gallops  Lungs: bilateral wheezes and rhonchi  Abdomen: soft non-tender non-distended, no hepatosplenomegaly  Ext:2+ edema of legs  Skin: no rashes and no petechiae  Neuro: lethargic    MEDICATIONS  (STANDING):  albumin human  5% IVPB 250 milliLiter(s) IV Intermittent every 6 hours  ALBUTerol/ipratropium for Nebulization 3 milliLiter(s) Nebulizer every 6 hours  cefepime  IVPB 1000 milliGRAM(s) IV Intermittent every 12 hours  dexamethasone  Injectable 4 milliGRAM(s) IV Push every 12 hours  dextrose 5%. 1000 milliLiter(s) (50 mL/Hr) IV Continuous <Continuous>  nystatin Powder 1 Application(s) Topical two times a day  pantoprazole  Injectable 40 milliGRAM(s) IV Push daily    MEDICATIONS  (PRN):  acetaminophen  Suppository 650 milliGRAM(s) Rectal every 6 hours PRN For Temp greater than 38.5 C (101.3 F)  HYDROmorphone  Injectable 0.5 milliGRAM(s) IV Push every 2 hours PRN dyspnea  HYDROmorphone  Injectable 0.5 milliGRAM(s) IV Push every 2 hours PRN pain  LORazepam   Injectable 0.5 milliGRAM(s) IV Push every 2 hours PRN Agitation  ondansetron Injectable 4 milliGRAM(s) IV Push every 6 hours PRN Nausea and/or Vomiting  sodium chloride 0.65% Nasal 1 Spray(s) Both Nostrils daily PRN Nasal Congestion      Allergies    Levaquin (Short breath)  penicillin (Swelling)    Intolerances        Vital Signs Last 24 Hrs  T(C): 34.8 (26 Feb 2018 10:05), Max: 36.4 (25 Feb 2018 17:08)  T(F): 94.6 (26 Feb 2018 10:05), Max: 97.5 (25 Feb 2018 17:08)  HR: 59 (26 Feb 2018 10:05) (59 - 98)  BP: 82/48 (26 Feb 2018 09:56) (82/48 - 105/70)  BP(mean): --  RR: 16 (26 Feb 2018 10:05) (14 - 18)  SpO2: 97% (26 Feb 2018 10:05) (96% - 100%)      LABS:                          7.6    7.21  )-----------( 35       ( 26 Feb 2018 09:15 )             23.7         Mean Cell Volume : 117.3 fl  Mean Cell Hemoglobin : 37.6 pg  Mean Cell Hemoglobin Concentration : 32.1 gm/dL      02-26    149<H>  |  101  |  160<H>  ----------------------------<  146<H>  4.8   |  13<L>  |  3.71<H>    Ca    10.3      26 Feb 2018 09:24    TPro  5.8<L>  /  Alb  4.7  /  TBili  13.5<H>  /  DBili  x   /  AST  104<H>  /  ALT  60<H>  /  AlkPhos  121<H>  02-26      PT/INR - ( 25 Feb 2018 10:39 )   PT: 41.6 sec;   INR: 3.75 ratio                          8.5    10.3  )-----------( 44       ( 25 Feb 2018 10:39 )             25.5         Mean Cell Volume : 118.0 fl  Mean Cell Hemoglobin : 39.3 pg  Mean Cell Hemoglobin Concentration : 33.4 gm/dL  Auto Neutrophil # : x  Auto Lymphocyte # : x  Auto Monocyte # : x  Auto Eosinophil # : x  Auto Basophil # : x  Auto Neutrophil % : x  Auto Lymphocyte % : x  Auto Monocyte % : x  Auto Eosinophil % : x  Auto Basophil % : x      02-24    147<H>  |  101  |  126<H>  ----------------------------<  136<H>  3.6   |  24  |  2.50<H>    Ca    9.7      24 Feb 2018 07:33    TPro  5.4<L>  /  Alb  3.8  /  TBili  10.3<H>  /  DBili  x   /  AST  88<H>  /  ALT  46<H>  /  AlkPhos  156<H>  02-24      Lactate Dehydrogenase, Serum: 397 U/L (02-24 @ 07:33)  Reticulocyte Percent: 4.1 % (02-24 @ 07:33)      Misc. hematology 02-24 @ 07:33  4.1 %  --  --

## 2018-02-26 NOTE — PROGRESS NOTE ADULT - ASSESSMENT
· Assessment		  73 yo female with PMH of breast cancer with mets to lung, liver, bones, s/p mastectomy, s/p depression, HTN, HLD, T2DM, presents with SOB and LE swelling. S/P RRT  Hemolytic-uremic syndrome, s/p Solinis Rx.  Prognosis grave in seting of multi organ dysfunction and now with bacteremia  For comfort care/ Palliative care team approach.  DNR  d/w family at length QA      Rick Jacobo MD pager [phone]

## 2018-02-26 NOTE — PROGRESS NOTE ADULT - SUBJECTIVE AND OBJECTIVE BOX
Patient seen and examined  obtunded  appears sob- on face mask  family by bedside ( emotional)    Levaquin (Short breath)  penicillin (Swelling)    Hospital Medications:   MEDICATIONS  (STANDING):  ALBUTerol/ipratropium for Nebulization 3 milliLiter(s) Nebulizer every 6 hours  cefepime  IVPB 1000 milliGRAM(s) IV Intermittent every 12 hours  dexamethasone  Injectable 4 milliGRAM(s) IV Push every 12 hours  dextrose 5%. 1000 milliLiter(s) (50 mL/Hr) IV Continuous <Continuous>  nystatin Powder 1 Application(s) Topical two times a day  pantoprazole  Injectable 40 milliGRAM(s) IV Push daily    VITALS:  T(F): 94.6 (18 @ 10:05), Max: 97.5 (18 @ 17:08)  HR: 59 (18 @ 10:05)  BP: 82/48 (18 @ 09:56)  RR: 16 (18 @ 10:05)  SpO2: 97% (18 @ 10:05)  Wt(kg): --     @ 07:01  -   @ 07:00  --------------------------------------------------------  IN: 850 mL / OUT: 0 mL / NET: 850 mL        PHYSICAL EXAM:  Constitutional: lethargic-- on facemask  HEENT: icteric sclera,  Neck: No JVD  Respiratory:b/l rhonchi  Cardiovascular: S1, S2, RRR  Gastrointestinal: BS+, soft, NT, obese  Extremities: 4+ pitting edema b/l  Neurological: A/O x 1    LABS:      149<H>  |  101  |  160<H>  ----------------------------<  146<H>  4.8   |  13<L>  |  3.71<H>    Ca    10.3      2018 09:24    TPro  5.8<L>  /  Alb  4.7  /  TBili  13.5<H>  /  DBili      /  AST  104<H>  /  ALT  60<H>  /  AlkPhos  121<H>      Creatinine Trend: 3.71 <--, 2.73 <--, 2.50 <--, 2.43 <--, 2.42 <--, 2.17 <--, 1.86 <--                        7.6    7.21  )-----------( 35       ( 2018 09:15 )             23.7     Urine Studies:  Urinalysis Basic - ( 2018 16:39 )    Color: Yellow / Appearance: Clear / S.012 / pH:   Gluc:  / Ketone: Negative  / Bili: Negative / Urobili: Negative   Blood:  / Protein: Trace / Nitrite: Negative   Leuk Esterase: Negative / RBC: 0-2 /HPF / WBC 2-5 /HPF   Sq Epi:  / Non Sq Epi: Occasional /HPF / Bacteria: Few /HPF      Creatinine, Random Urine: 54 mg/dL ( @ 18:59)  Protein/Creatinine Ratio Calculation: 0.6 Ratio ( @ 18:59)  Osmolality, Random Urine: 354 mos/kg ( @ 16:55)  Chloride, Random Urine: <35 mmol/L ( @ 16:51)  Potassium, Random Urine: 51 mmol/L ( @ 16:51)  Sodium, Random Urine: <20 mmol/L ( @ 16:51)  Creatinine, Random Urine: 58 mg/dL ( @ 16:51)    RADIOLOGY & ADDITIONAL STUDIES:

## 2018-02-26 NOTE — PROGRESS NOTE ADULT - PROBLEM SELECTOR PROBLEM 5
PIERRE (acute kidney injury)
PIERRE (acute kidney injury)
Type 2 diabetes mellitus
Palliative care encounter

## 2018-02-26 NOTE — PROGRESS NOTE ADULT - PROBLEM SELECTOR PLAN 1
Pre Renal vs ATN ( gemcitabine /TMA ) vs HRS.   Differentials include multifactorial  s/p carty- minimal urineoutput; cr continues to rise  pt to recived last dose of albumin  prognosis appears very poor-  D/w family in length about all options- they wish NOT to pursue RRT given overall clinical picture  agree with palliative care- c/w symptom control- would consider lasix prn if sob worsens  emotional support given to family  will monitor closely

## 2018-02-26 NOTE — PROVIDER CONTACT NOTE (CRITICAL VALUE NOTIFICATION) - SITUATION
Blood culture 2/25 growth in the aerobic gram + cocci in clusters
Blood culture 2/25 gram + cocci in clusters in anaerobic bottle
RRT called for respiratory distress

## 2018-02-26 NOTE — PROGRESS NOTE ADULT - PROBLEM SELECTOR PROBLEM 1
Breast cancer
PIERRE (acute kidney injury)
PIERRE (acute kidney injury)
Malignant neoplasm of female breast, unspecified estrogen receptor status, unspecified laterality, unspecified site of breast
PIERRE (acute kidney injury)

## 2018-02-27 LAB
CULTURE RESULTS: SIGNIFICANT CHANGE UP
ORGANISM # SPEC MICROSCOPIC CNT: SIGNIFICANT CHANGE UP
ORGANISM # SPEC MICROSCOPIC CNT: SIGNIFICANT CHANGE UP
SPECIMEN SOURCE: SIGNIFICANT CHANGE UP

## 2018-02-27 PROCEDURE — 36600 WITHDRAWAL OF ARTERIAL BLOOD: CPT

## 2018-02-27 PROCEDURE — 87206 SMEAR FLUORESCENT/ACID STAI: CPT

## 2018-02-27 PROCEDURE — 84133 ASSAY OF URINE POTASSIUM: CPT

## 2018-02-27 PROCEDURE — 85730 THROMBOPLASTIN TIME PARTIAL: CPT

## 2018-02-27 PROCEDURE — 88112 CYTOPATH CELL ENHANCE TECH: CPT

## 2018-02-27 PROCEDURE — 82436 ASSAY OF URINE CHLORIDE: CPT

## 2018-02-27 PROCEDURE — 83735 ASSAY OF MAGNESIUM: CPT

## 2018-02-27 PROCEDURE — 87015 SPECIMEN INFECT AGNT CONCNTJ: CPT

## 2018-02-27 PROCEDURE — 84300 ASSAY OF URINE SODIUM: CPT

## 2018-02-27 PROCEDURE — 87070 CULTURE OTHR SPECIMN AEROBIC: CPT

## 2018-02-27 PROCEDURE — 93005 ELECTROCARDIOGRAM TRACING: CPT

## 2018-02-27 PROCEDURE — 82947 ASSAY GLUCOSE BLOOD QUANT: CPT

## 2018-02-27 PROCEDURE — 84156 ASSAY OF PROTEIN URINE: CPT

## 2018-02-27 PROCEDURE — C1729: CPT

## 2018-02-27 PROCEDURE — 82945 GLUCOSE OTHER FLUID: CPT

## 2018-02-27 PROCEDURE — 71250 CT THORAX DX C-: CPT

## 2018-02-27 PROCEDURE — 87186 SC STD MICRODIL/AGAR DIL: CPT

## 2018-02-27 PROCEDURE — 87633 RESP VIRUS 12-25 TARGETS: CPT

## 2018-02-27 PROCEDURE — 74176 CT ABD & PELVIS W/O CONTRAST: CPT

## 2018-02-27 PROCEDURE — 82248 BILIRUBIN DIRECT: CPT

## 2018-02-27 PROCEDURE — 87075 CULTR BACTERIA EXCEPT BLOOD: CPT

## 2018-02-27 PROCEDURE — 92610 EVALUATE SWALLOWING FUNCTION: CPT

## 2018-02-27 PROCEDURE — 78582 LUNG VENTILAT&PERFUS IMAGING: CPT

## 2018-02-27 PROCEDURE — 94660 CPAP INITIATION&MGMT: CPT

## 2018-02-27 PROCEDURE — 82962 GLUCOSE BLOOD TEST: CPT

## 2018-02-27 PROCEDURE — 85045 AUTOMATED RETICULOCYTE COUNT: CPT

## 2018-02-27 PROCEDURE — 97162 PT EVAL MOD COMPLEX 30 MIN: CPT

## 2018-02-27 PROCEDURE — 90734 MENACWYD/MENACWYCRM VACC IM: CPT

## 2018-02-27 PROCEDURE — 94640 AIRWAY INHALATION TREATMENT: CPT

## 2018-02-27 PROCEDURE — 84145 PROCALCITONIN (PCT): CPT

## 2018-02-27 PROCEDURE — 88305 TISSUE EXAM BY PATHOLOGIST: CPT

## 2018-02-27 PROCEDURE — 83010 ASSAY OF HAPTOGLOBIN QUANT: CPT

## 2018-02-27 PROCEDURE — 83605 ASSAY OF LACTIC ACID: CPT

## 2018-02-27 PROCEDURE — 93970 EXTREMITY STUDY: CPT

## 2018-02-27 PROCEDURE — 74018 RADEX ABDOMEN 1 VIEW: CPT

## 2018-02-27 PROCEDURE — 87798 DETECT AGENT NOS DNA AMP: CPT

## 2018-02-27 PROCEDURE — 84295 ASSAY OF SERUM SODIUM: CPT

## 2018-02-27 PROCEDURE — 87486 CHLMYD PNEUM DNA AMP PROBE: CPT

## 2018-02-27 PROCEDURE — 93306 TTE W/DOPPLER COMPLETE: CPT

## 2018-02-27 PROCEDURE — 84484 ASSAY OF TROPONIN QUANT: CPT

## 2018-02-27 PROCEDURE — A9567: CPT

## 2018-02-27 PROCEDURE — 76775 US EXAM ABDO BACK WALL LIM: CPT

## 2018-02-27 PROCEDURE — 87581 M.PNEUMON DNA AMP PROBE: CPT

## 2018-02-27 PROCEDURE — 87150 DNA/RNA AMPLIFIED PROBE: CPT

## 2018-02-27 PROCEDURE — P9047: CPT

## 2018-02-27 PROCEDURE — 88342 IMHCHEM/IMCYTCHM 1ST ANTB: CPT

## 2018-02-27 PROCEDURE — 82042 OTHER SOURCE ALBUMIN QUAN EA: CPT

## 2018-02-27 PROCEDURE — 93975 VASCULAR STUDY: CPT

## 2018-02-27 PROCEDURE — P9045: CPT

## 2018-02-27 PROCEDURE — 84540 ASSAY OF URINE/UREA-N: CPT

## 2018-02-27 PROCEDURE — 84157 ASSAY OF PROTEIN OTHER: CPT

## 2018-02-27 PROCEDURE — 83935 ASSAY OF URINE OSMOLALITY: CPT

## 2018-02-27 PROCEDURE — 49083 ABD PARACENTESIS W/IMAGING: CPT

## 2018-02-27 PROCEDURE — 87205 SMEAR GRAM STAIN: CPT

## 2018-02-27 PROCEDURE — 96374 THER/PROPH/DIAG INJ IV PUSH: CPT

## 2018-02-27 PROCEDURE — 71045 X-RAY EXAM CHEST 1 VIEW: CPT

## 2018-02-27 PROCEDURE — 83615 LACTATE (LD) (LDH) ENZYME: CPT

## 2018-02-27 PROCEDURE — 80053 COMPREHEN METABOLIC PANEL: CPT

## 2018-02-27 PROCEDURE — 88341 IMHCHEM/IMCYTCHM EA ADD ANTB: CPT

## 2018-02-27 PROCEDURE — 82435 ASSAY OF BLOOD CHLORIDE: CPT

## 2018-02-27 PROCEDURE — 82330 ASSAY OF CALCIUM: CPT

## 2018-02-27 PROCEDURE — 86160 COMPLEMENT ANTIGEN: CPT

## 2018-02-27 PROCEDURE — 82803 BLOOD GASES ANY COMBINATION: CPT

## 2018-02-27 PROCEDURE — 82570 ASSAY OF URINE CREATININE: CPT

## 2018-02-27 PROCEDURE — 89051 BODY FLUID CELL COUNT: CPT

## 2018-02-27 PROCEDURE — 85610 PROTHROMBIN TIME: CPT

## 2018-02-27 PROCEDURE — 84132 ASSAY OF SERUM POTASSIUM: CPT

## 2018-02-27 PROCEDURE — 85027 COMPLETE CBC AUTOMATED: CPT

## 2018-02-27 PROCEDURE — 87102 FUNGUS ISOLATION CULTURE: CPT

## 2018-02-27 PROCEDURE — 83036 HEMOGLOBIN GLYCOSYLATED A1C: CPT

## 2018-02-27 PROCEDURE — 81001 URINALYSIS AUTO W/SCOPE: CPT

## 2018-02-27 PROCEDURE — 99291 CRITICAL CARE FIRST HOUR: CPT | Mod: 25

## 2018-02-27 PROCEDURE — 87116 MYCOBACTERIA CULTURE: CPT

## 2018-02-27 PROCEDURE — 82140 ASSAY OF AMMONIA: CPT

## 2018-02-27 PROCEDURE — 80048 BASIC METABOLIC PNL TOTAL CA: CPT

## 2018-02-27 PROCEDURE — 83880 ASSAY OF NATRIURETIC PEPTIDE: CPT

## 2018-02-27 PROCEDURE — A9540: CPT

## 2018-02-27 PROCEDURE — 85397 CLOTTING FUNCT ACTIVITY: CPT

## 2018-02-27 PROCEDURE — 85014 HEMATOCRIT: CPT

## 2018-02-27 PROCEDURE — 84100 ASSAY OF PHOSPHORUS: CPT

## 2018-02-27 PROCEDURE — 97530 THERAPEUTIC ACTIVITIES: CPT

## 2018-02-27 PROCEDURE — 87040 BLOOD CULTURE FOR BACTERIA: CPT

## 2018-02-27 PROCEDURE — 87449 NOS EACH ORGANISM AG IA: CPT

## 2018-02-27 RX ADMIN — NYSTATIN CREAM 1 APPLICATION(S): 100000 CREAM TOPICAL at 06:10

## 2018-02-27 RX ADMIN — HYDROMORPHONE HYDROCHLORIDE 0.5 MILLIGRAM(S): 2 INJECTION INTRAMUSCULAR; INTRAVENOUS; SUBCUTANEOUS at 06:07

## 2018-02-27 RX ADMIN — HYDROMORPHONE HYDROCHLORIDE 0.5 MILLIGRAM(S): 2 INJECTION INTRAMUSCULAR; INTRAVENOUS; SUBCUTANEOUS at 01:44

## 2018-02-27 RX ADMIN — Medication 4 MILLIGRAM(S): at 06:10

## 2018-02-27 RX ADMIN — HYDROMORPHONE HYDROCHLORIDE 0.5 MILLIGRAM(S): 2 INJECTION INTRAMUSCULAR; INTRAVENOUS; SUBCUTANEOUS at 03:46

## 2018-02-27 NOTE — PROVIDER CONTACT NOTE (OTHER) - ASSESSMENT
No response to external stimuli  no spontaneous respirations  no apical heart rate  negative pupillary response to light

## 2018-02-27 NOTE — DISCHARGE NOTE FOR THE EXPIRED PATIENT - HOSPITAL COURSE
71 yo F with PMH of breast cancer with mets to lung, liver, bones, s/p mastectomy, s/p depression, HTN, HLD, T2DM, presents with SOB and LE swelling.  Patient has been doing well except last 2-3 weeks had SOB and LE edema, managed for pneumonia and CHF exacerbation.  Patient’s hospital course  complicated by Sepsis, acute encephalopathy, Liver failure, Hemolytic Uremic Syndrome/Renal failure with family declining hemodialysis and aggressive measures. Patient transferred to PCU for end of life care and advanced illness symptom management.  Patient managed for dyspnea, encephalopathy, and family support.  Patient  18 @ 715am.

## 2018-02-28 LAB
-  AMPICILLIN/SULBACTAM: SIGNIFICANT CHANGE UP
-  CEFAZOLIN: SIGNIFICANT CHANGE UP
-  CIPROFLOXACIN: SIGNIFICANT CHANGE UP
-  CLINDAMYCIN: SIGNIFICANT CHANGE UP
-  ERYTHROMYCIN: SIGNIFICANT CHANGE UP
-  GENTAMICIN: SIGNIFICANT CHANGE UP
-  LEVOFLOXACIN: SIGNIFICANT CHANGE UP
-  MOXIFLOXACIN(AEROBIC): SIGNIFICANT CHANGE UP
-  OXACILLIN: SIGNIFICANT CHANGE UP
-  PENICILLIN: SIGNIFICANT CHANGE UP
-  RIFAMPIN: SIGNIFICANT CHANGE UP
-  TETRACYCLINE: SIGNIFICANT CHANGE UP
-  TRIMETHOPRIM/SULFAMETHOXAZOLE: SIGNIFICANT CHANGE UP
-  VANCOMYCIN: SIGNIFICANT CHANGE UP
ADAMTS 13 ACTIVITY REFLEX: SIGNIFICANT CHANGE UP
CULTURE RESULTS: SIGNIFICANT CHANGE UP
METHOD TYPE: SIGNIFICANT CHANGE UP
ORGANISM # SPEC MICROSCOPIC CNT: SIGNIFICANT CHANGE UP
ORGANISM # SPEC MICROSCOPIC CNT: SIGNIFICANT CHANGE UP
SPECIMEN SOURCE: SIGNIFICANT CHANGE UP

## 2018-03-08 NOTE — CHART NOTE - NSCHARTNOTEFT_GEN_A_CORE
patient admitted with shortness of breath  with acute respiratory hypoxic secondary to metastatic breast cancer, acute diastolic heart failure. Patient has acute encephalopathy from liver failure, hepato r.enal syndrome.PIERRE is secondary to hepatorenal syndrome.

## 2018-03-17 LAB
CULTURE RESULTS: SIGNIFICANT CHANGE UP
SPECIMEN SOURCE: SIGNIFICANT CHANGE UP

## 2018-04-07 LAB
CULTURE RESULTS: SIGNIFICANT CHANGE UP
SPECIMEN SOURCE: SIGNIFICANT CHANGE UP

## 2018-05-07 NOTE — PROGRESS NOTE ADULT - ASSESSMENT
73 yo female with PMH of breast cancer with mets to lung, liver, bones, s/p mastectomy, s/p depression, HTN, HLD, T2DM, presents with SOB and LE swelling found to have PIERRE. Renal following for PIERRE    labs reviewed Principal Discharge DX:	Chest pain, musculoskeletal

## 2018-10-05 LAB
ADAMTS13 AB: SIGNIFICANT CHANGE UP
ADAMTS13 ACTIVITY: <= 0.4 — SIGNIFICANT CHANGE UP
ADAMTS13 ACTIVITY: SIGNIFICANT CHANGE UP

## 2019-08-26 NOTE — PROGRESS NOTE ADULT - ASSESSMENT
72 year old female with breast cancer with mets to lung, liver, bones, s/p mastectomy, s/p depression, HTN, HLD, T2DM admitted with LE edema/ SOB     1. cv stable     2. SOB, likely secondary to pulm disease, met lung disease   echo revealing normal lv sys fx   VQ scan revealing low probability of PE   ct chest results noted   IVP lasix started   monitor creat levels   pulm f/u     3. LE edema   likely secondary to hypoalbuminemia / extensive mets disease   on lasix and albumin IV   med/ onc / renal f/u      4 htn   bp stable  cont propranolol     5. elevated LFTS  gi f/u   s/p paracentesis  with replacement of albumin    6. PIERRE   renal f.u     dvt ppx - - -

## 2020-08-15 NOTE — PROGRESS NOTE ADULT - SUBJECTIVE AND OBJECTIVE BOX
CARDIOLOGY FOLLOW UP - Dr. Amaral    CC no chest pain or sob         PHYSICAL EXAM:  T(C): 36.7 (02-16-18 @ 16:30), Max: 36.7 (02-16-18 @ 16:30)  HR: 75 (02-16-18 @ 16:30) (69 - 79)  BP: 98/61 (02-16-18 @ 16:30) (97/64 - 114/64)  RR: 18 (02-16-18 @ 16:30) (18 - 19)  SpO2: 96% (02-16-18 @ 16:30) (96% - 98%)  Wt(kg): --  I&O's Summary    15 Feb 2018 07:01  -  16 Feb 2018 07:00  --------------------------------------------------------  IN: 740 mL / OUT: 875 mL / NET: -135 mL    16 Feb 2018 07:01  -  16 Feb 2018 17:03  --------------------------------------------------------  IN: 240 mL / OUT: 200 mL / NET: 40 mL        Appearance: Normal	  Cardiovascular: Normal S1 S2,RRR, No JVD, No murmurs  Respiratory: Lungs clear to auscultation/ diminished at base 	  Gastrointestinal:  Soft, Non-tender, + BS	  Extremities: + 3 BL LE edema        MEDICATIONS  (STANDING):  albumin human  5% IVPB 250 milliLiter(s) IV Intermittent every 6 hours  ALBUTerol/ipratropium for Nebulization 3 milliLiter(s) Nebulizer every 6 hours  buDESOnide   0.5 milliGRAM(s) Respule 0.5 milliGRAM(s) Inhalation every 12 hours  dextrose 5%. 1000 milliLiter(s) (50 mL/Hr) IV Continuous <Continuous>  dextrose 50% Injectable 12.5 Gram(s) IV Push once  dextrose 50% Injectable 25 Gram(s) IV Push once  dextrose 50% Injectable 25 Gram(s) IV Push once  escitalopram 20 milliGRAM(s) Oral daily  insulin glargine Injectable (LANTUS) 32 Unit(s) SubCutaneous at bedtime  insulin lispro (HumaLOG) corrective regimen sliding scale   SubCutaneous three times a day before meals  insulin lispro (HumaLOG) corrective regimen sliding scale   SubCutaneous at bedtime  nystatin Powder 1 Application(s) Topical two times a day  pantoprazole    Tablet 40 milliGRAM(s) Oral before breakfast  predniSONE   Tablet 5 milliGRAM(s) Oral daily  propranolol 10 milliGRAM(s) Oral daily      TELEMETRY: 	    ECG:  	  RADIOLOGY:   DIAGNOSTIC TESTING:  [x ] Echocardiogram:  < from: Transthoracic Echocardiogram (02.14.18 @ 08:59) >  Conclusions:  1. Calcified trileaflet aortic valve with normalopening.  Mild aortic regurgitation.  2. Normal left ventricular systolic function. No segmental  wall motion abnormalities.  3. The right ventricle is not well visualized; grossly  normal right ventricular systolic function.  4. No pericardial effusion seen. Bilateral pleural  effusions. Ascites seen.  *** No previous Echo exam.  ------------------------------------------------------------------------    < end of copied text >    [ ]  Catheterization:  [ ] Stress Test:    OTHER: 	    LABS:	 	                                9.5    11.04 )-----------( 64       ( 16 Feb 2018 09:23 )             28.4     02-16    136  |  95<L>  |  75<H>  ----------------------------<  90  3.5   |  26  |  1.99<H>    Ca    8.1<L>      16 Feb 2018 09:17 No

## 2021-01-01 NOTE — PROGRESS NOTE ADULT - SUBJECTIVE AND OBJECTIVE BOX
[facility] - [facility]     PROGRESS NOTE    CHIEF COMPLAINT: pneumonia; pleural effusions; atelectasis; dyspnea; hypoxemia; radiation pneumonitis; abnormal CXR; bronchospasm    INTERVAL HISTORY:     REVIEW OF SYSTEMS:  Constitutional: As per interval history  HEENT: Within normal limits  CV: As per interval history  Resp: As per interval history  GI: Within normal limits   : Within normal limits  Musculoskeletal: Within normal limits  Skin: Within normal limits  Neurological: Within normal limits  Psychiatric: Within normal limits  Endocrine: Within normal limits  Hematologic/Lymphatic: Within normal limits  Allergic/Immunologic: Within normal limits      MEDICATIONS:     Pulmonary "  ALBUTerol/ipratropium for Nebulization 3 milliLiter(s) Nebulizer every 6 hours  buDESOnide   0.5 milliGRAM(s) Respule 0.5 milliGRAM(s) Inhalation every 12 hours      Anti-microbials:  cefepime  IVPB 1000 milliGRAM(s) IV Intermittent every 12 hours      Cardiovascular:  furosemide   Injectable 60 milliGRAM(s) IV Push every 12 hours  propranolol 10 milliGRAM(s) Oral daily      Other:  albumin human  5% IVPB 250 milliLiter(s) IV Intermittent every 6 hours  dextrose 5%. 1000 milliLiter(s) IV Continuous <Continuous>  dextrose 50% Injectable 12.5 Gram(s) IV Push once  dextrose 50% Injectable 25 Gram(s) IV Push once  dextrose 50% Injectable 25 Gram(s) IV Push once  dextrose Gel 1 Dose(s) Oral once PRN  docusate sodium 100 milliGRAM(s) Oral three times a day  escitalopram 20 milliGRAM(s) Oral daily  glucagon  Injectable 1 milliGRAM(s) IntraMuscular once PRN  insulin glargine Injectable (LANTUS) 32 Unit(s) SubCutaneous at bedtime  insulin lispro (HumaLOG) corrective regimen sliding scale   SubCutaneous every 6 hours  nystatin Powder 1 Application(s) Topical two times a day  pantoprazole    Tablet 40 milliGRAM(s) Oral before breakfast  polyethylene glycol 3350 17 Gram(s) Oral at bedtime PRN  predniSONE   Tablet 50 milliGRAM(s) Oral daily  senna 2 Tablet(s) Oral at bedtime PRN        OBJECTIVE:        I&O's Detail    2018 07:01  -  2018 07:00  --------------------------------------------------------  IN:    Oral Fluid: 205 mL  Total IN: 205 mL    OUT:  Total OUT: 0 mL    Total NET: 205 mL    POCT Blood Glucose.: 136 mg/dL (2018 06:17)  POCT Blood Glucose.: 154 mg/dL (2018 23:59)  POCT Blood Glucose.: 135 mg/dL (2018 21:17)  POCT Blood Glucose.: 120 mg/dL (2018 16:29)  POCT Blood Glucose.: 109 mg/dL (2018 11:37)      PHYSICAL EXAM:       ICU Vital Signs Last 24 Hrs  T(C): 36.6 (2018 04:59), Max: 36.7 (2018 08:48)  T(F): 97.8 (2018 04:59), Max: 98.1 (2018 08:48)  HR: 82 (2018 04:59) (72 - 85)  BP: 125/75 (2018 04:59) (98/62 - 125/75)  BP(mean): --  ABP: --  ABP(mean): --  RR: 16 (2018 04:59) (16 - 18)  SpO2: 94% (2018 04:59) (93% - 96%)     General: Awake. Alert. Cooperative. No distress. Appears stated age 	  HEENT:   AT/NC/Anicteric. PERRL/EOMI. Normal oral mucosa,  Neck: Supple. Trachea midline. Thyroid without enlargement/tenderness/nodules. No carotid bruit. No JVD	  Cardiovascular: Regular rate and rhythm. S1 S2 normal. No M/R/G  Respiratory: Respirations unlabored. Clear to A&P  Abdomen: Soft. Non-tender. Non-distended. No organomegaly. No masses. Normal BS  Extremities: Warm to touch. No CCE  Pulses: 2+ peripheral pulses all extremities	  Skin: Normal skin color. No rashes or lesions. No ecchymoses. No cyanosis.  Warm to touch  Lymph Nodes: Cervical, supraclavicular and axillary nodes normal  Neurological: Motor and sensory examination equal and normal. A and O x 3  Psychiatry: Appropriate mood and affect.      LABS:                        8.8    7.5   )-----------( 55       ( 2018 06:35 )             25.8                         9.0    8.4   )-----------( 51       ( 2018 06:37 )             26.0         140  |  96  |  86<H>  ----------------------------<  142<H>  3.9   |  28  |  1.86<H>        138  |  95<L>  |  78<H>  ----------------------------<  111<H>  3.8   |  26  |  1.82<H>    Ca      9.8          Ca      9.3            TPro  5.6<L>  /  Alb  3.8  /  TBili  7.3<H>  /  DBili  3.9<H>  /  AST  59<H>  /  ALT  34  /  AlkPhos  174<H>      ABG - ( 2018 10:33 )  pH: 7.50  /  pCO2: 34    /  pO2: 73    / HCO3: 27    / Base Excess: 4.0   /  SaO2: 95        Procalcitonin, Serum: 1.41 ng/mL ( @ 10:14)    Procalcitonin, Serum: 1.48 ng/mL ( @ 11:10)    Ammonia, Serum (18 @ 06:35)    Ammonia, Serum: 61 umol/L    Ammonia, Serum (18 @ 17:59)    Ammonia, Serum: 47 umol/L    < from: Transthoracic Echocardiogram (18 @ 08:59) >    Patient name: STELLA SIMMONS  YOB: 1945   Age: 72 (F)   MR#: [id number]  Study Date: 2018  Location: [address] [place], [place] [id number]: [facility]  Study quality: Technically difficult  Referring Physician: Rick Jacoob MD  Blood Pressure: 124/72 mmHg  Height: 163 cm  Weight: 93 kg  BSA: 2 m2  ------------------------------------------------------------------------  PROCEDURE: Transthoracic echocardiogram with 2-D, M-Mode  and complete spectral and color flow Doppler.  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 Mild mitral  regurgitation.  Aortic Valve/Aorta: Calcified trileaflet aortic valve with  normal opening. Mild aortic regurgitation.  Normal aortic root size. (Ao: 2.9 cm at the sinuses of  Valsalva).  Left Atrium: Normal left atrium.  LA volume index = 26  cc/m2.  Left Ventricle: Normal left ventricular systolic function.  No segmental wall motion abnormalities. Normal left  ventricular internal dimensions and wall thicknesses.  Right Heart: Normal right atrium. The right ventricle is  not well visualized; grossly normal right ventricular  systolic function. Normal tricuspid valve. Mild-moderate  tricuspid regurgitation. Pulmonic valve not well  visualized, probably normal. Minimal pulmonic  regurgitation.  Pericardium/Pleura: No pericardial effusion seen. Bilateral  pleural effusions. Ascites seen.  Hemodynamic: Estimated right atrial pressure is 8 mm Hg.  Estimated right ventricular systolic pressure equals 42 mm  Hg, assuming right atrial pressure equals 8 mm Hg,  consistent with mild pulmonary hypertension.  ------------------------------------------------------------------------  Conclusions:  1. Calcified trileaflet aortic valve with normalopening.  Mild aortic regurgitation.  2. Normal left ventricular systolic function. No segmental  wall motion abnormalities.  3. The right ventricle is not well visualized; grossly  normal right ventricular systolic function.  4. No pericardial effusion seen. Bilateral pleural  effusions. Ascites seen.  *** No previous Echo exam.  ------------------------------------------------------------------------  Confirmed on  2018 - 15:24:26 by Petey Cortes M.D.  ------------------------------------------------------------------------    < end of copied text >  ------------------------------------------------------------------------------------------------  MICROBIOLOGY:     Culture - Body Fluid with Gram Stain (18 @ 19:03)    Gram Stain:   polymorphonuclear leukocytes seen  No organisms seen  by cytocentrifuge    Specimen Source: Peritoneal Peritoneal Fluid    Culture Results:   No growth    Culture - Acid Fast - Body Fluid w/Smear (18 @ 19:03)    Specimen Source: .Body Fluid Peritoneal Fluid    Acid Fast Bacilli Smear:   No acid fast bacilli seen by fluorochrome stain    Urinalysis Basic - ( 15 Feb 2018 16:13 )    Color: Karo / Appearance: Clear / S.016 / pH: x  Gluc: x / Ketone: Negative  / Bili: Small / Urobili: 1.0 mg/dL   Blood: x / Protein: Negative mg/dL / Nitrite: Negative   Leuk Esterase: Negative / RBC: 5 /HPF / WBC 2 /HPF   Sq Epi: x / Non Sq Epi: 21 /HPF / Bacteria: Occasional    Rapid Respiratory Viral Panel (18 @ 17:08)    Rapid RVP Result: NotDetec: The FilmArray RVP Rapid uses polymerase chain reaction (PCR) and melt  curve analysis to screen for adenovirus; coronavirus HKU1, NL63, 229E,  OC43; human metapneumovirus (hMPV); human enterovirus/rhinovirus  (Entero/RV); influenza A; influenza A/H1;influenza A/H3; influenza  A/H1-2009; influenza B; parainfluenza viruses 1, 2, 3, 4; respiratory  syncytial virus; Bordetella pertussis; Mycoplasma pneumoniae; and  Chlamydophila pneumoniae.    RADIOLOGY:  [x] Chest radiographs reviewed and interpreted by me    < from: CT Chest No Cont (18 @ 11:27) >    EXAM:  CT CHEST                            PROCEDURE DATE:  2018        INTERPRETATION:  CLINICAL INFORMATION: Metastatic breast cancer status   post chest irradiation, rule out pneumonia    PROCEDURE:   CT of the Chest was performed without intravenous contrast.   Intravenous contrast: None.    Reconstructions were performed in axial thin, axial maximum intensity   projection, sagittal and coronal planes.    COMPARISON: CT abdomen pelvis 2018. Chest x-ray 2018.    FINDINGS:    LUNGS AND LARGE AIRWAYS: There is near complete occlusion of the left   lower lobe bronchus and partial collapse of the left lower lobe. There is   patchy opacity in the left lower lobe which may represent pneumonia..   Segmental atelectasis is noted within the bilateral upper lobes.   Nonspecific peripheral patchy and groundglass opacities in the right   upper lobe.  PLEURA: Moderate bilateral pleural effusions with associated lower lobe   passive atelectasis.  VESSELS: Atherosclerotic calcifications of the aorta and coronary   arteries.  HEART: Heart size is normal. Trace pericardial effusion.  MEDIASTINUM AND KENISHA: No lymphadenopathy.  CHEST WALL AND LOWER NECK: Left chest wall Mediport catheter distal tip   is in the SVC. Status post right mastectomy. Subcutaneous edema.  UPPER ABDOMEN: Cirrhotic liver with multiple hyper and hypodense lesions   is unchanged. Moderate volume perihepatic and perisplenic ascites.  BONES: Sclerotic osseous metastases, most predominantly in the T6 and T8   vertebral bodies, as well as in the sternum.    IMPRESSION:    Moderate bilateral pleural effusions with bilateral lower and upper lobe   atelectasis. Left lower lobe atelectasis may be secondary to a left lower   lobe bronchus mucous plug.    Patchy groundglass opacities in the right upper lobe and left lower lobe   may be secondary to infection.    Hepatic and blastic osseous metastases.    CHELSY ENCISO M.D., RADIOLOGY RESIDENT  This document has been electronically signed.  MICAH LOPEZ M.D., ATTENDING RADIOLOGIST  This document has been electronically signed. 2018 12:52PM      < end of copied text >  ---------------------------------------------------------------------------------------------------------  < from: Xray Abdomen 1 View PORTABLE -Urgent (18 @ 17:44) >    EXAM:  XR ABDOMEN PORTABLE URGENT 1V                            PROCEDURE DATE:  2018      INTERPRETATION:  EXAMINATION: XR ABDOMEN PORTABLE URGENT 1V    CLINICAL INDICATION: abdominal distention    TECHNIQUE: 2 portable views of the abdomen were obtained.    COMPARISON: CT abdomen 2018.    IMPRESSION:    The study is limited by technique and patient's body habitus. There are   nonspecific mildly dilated loops of small bowel. There is stool in the   rectum. There is centralization of the bowel loops, compatible with a   known ascites. There is a left pleural effusion and left basilar   atelectasis versus pneumonia. Again seen, are osseous metastasis. There   are degenerative changes of the visualized spine.    RIANNA SANTIAGO M.D., ATTENDING RADIOLOGIST  This document has been electronically signed. 2018  2:33PM      < end of copied text >  ---------------------------------------------------------------------------------------------------------  < from: NM Pulmonary Ventilation/Perfusion Scan (18 @ 10:56) >    EXAM:  NM PULM VENTILATION PERFUS IMG                            PROCEDURE DATE:  2018      INTERPRETATION:  RADIOPHARMACEUTICAL: 1 mCi Tc-99m-DTPA aerosol by   inhalation; 6 mCi Tc-99m-MAA, I.V.    CLINICAL STATEMENT: 72-year-old female with shortness of breath and   hypoxia.    IMPRESSION: Abnormal ventilation/perfusion lung scan.    Very low probability of pulmonary embolus.    YANELY TILLEY M.D., NUCLEAR MEDICINE ATTENDING  This document has been electronically signed. 2018 11:19AM      < end of copied text >  ---------------------------------------------------------------------------------------------------------    < from: CT Abdomen and Pelvis No Cont (18 @ 20:02) >    EXAM:  CT ABDOMEN AND PELVIS                            PROCEDURE DATE:  2018        INTERPRETATION:  CLINICAL INFORMATION: Elevated bilirubin. History of   breast cancer. Shortness of breath.    COMPARISON: None.    PROCEDURE:   CT of the Abdomen and Pelvis was performed without intravenous contrast.   Intravenous contrast: None.  Oral contrast: None.  Sagittal and coronal reformats were performed.    FINDINGS:    LOWER CHEST: Small left pleural effusion. Left basilar consolidation may   represent atelectasis or pneumonia. Coronary artery calcifications. Right   mastectomy.    LIVER: Nodular hepatic contour. Multiple bilobar ill-defined hypodense   hepatic lesions compatible with metastatic disease. Two lesions in the   right hepatic lobe demonstrate peripheral calcification.  BILE DUCTS: Normal caliber.  GALLBLADDER: Collapsed and therefore not well evaluated.  SPLEEN: Within normal limits.  PANCREAS: Within normal limits.  ADRENALS: Within normal limits.  KIDNEYS/URETERS: Subcentimeter hyperdense left renal focus too small to   characterize.    BLADDER: Within normal limits.  REPRODUCTIVE ORGANS: Anterior fundal fibroid.    BOWEL: Diverticulosis without diverticulitis. No bowel obstruction.   Appendix is not identified.  PERITONEUM: Moderate volume ascites.  VESSELS:  Atheromatous disease of the aorta.  RETROPERITONEUM: No lymphadenopathy.    ABDOMINAL WALL: Within normal limits.  BONES: Sclerotic osseous metastases in multiple vertebral bodies,   sternum, and ribs.    IMPRESSION:   Bilobar hepatic metastases.  Sclerotic osseous metastases.  Small left pleural effusion. Left basilar consolidation may represent   atelectasis or pneumonia.       ANAMARIA MORRIS M.D., RADIOLOGY RESIDENT  This document has been electronically signed.  MAGDY KEN M.D., ATTENDING RADIOLOGIST  This document has been electronically signed. 2018  8:27PM      < end of copied text >  ---------------------------------------------------------------------------------------------------------    < from: US Abdomen Doppler (18 @ 15:18) >    EXAM:  DUPLEX SCAN EXT VEINS LOWER BI                          EXAM:  US DPLX ABDOMEN                          PROCEDURE DATE:  2018      INTERPRETATION:  CLINICAL INFORMATION: Increasing liver function tests.   History of metastatic liver disease. Evaluation for portal vein   thrombosis.     EXAM: Focused grayscale, color, and spectral Doppler ultrasound imaging   was performed of the splenic, portal, and hepatic veins.    COMPARISON: CT abdomen pelvis dated 2018.    FINDINGS:    There is redemonstration of partially imaged metastatic liver disease,   nodular liver contour, and ascites.    The splenic, portal, hepatic veins are patent. There is no evidence of   thrombosis. The splenic and portal veins demonstrate hepatopedal flow.   Main portal vein measures 1.2 cm. The main portal vein has a peak   systolic velocity of 20 cm/s with phasic blood flow. The right and left   portal veins are patent.    Peak systolic velocity in the hepatic artery is 116.7 cm/s.    The intrahepatic IVC is patent.    IMPRESSION:    No evidence of splenic, portal, or hepatic vein thrombosis.    MILTON LAGOS M.D., RADIOLOGY RESIDENT  This document has been electronically signed.  SOFI REYEZ M.D., ATTENDING RADIOLOGIST  This document has been electronically signed. 2018  1:00PM      < end of copied text >  --------------------------------------------------------------------------------------------------------- [facility] - [facility]     PROGRESS NOTE    CHIEF COMPLAINT: pneumonia; pleural effusions; atelectasis; dyspnea; hypoxemia; radiation pneumonitis; abnormal CXR; bronchospasm    INTERVAL HISTORY: lethargic but arousable laying in bed; mildly short of breath; no fevers, chills or sweats; new hacking dry cough; mild chest congestion and wheeze; no chest pain/pressure or palpitations    REVIEW OF SYSTEMS:  Constitutional: As per interval history  HEENT: Within normal limits  CV: As per interval history  Resp: As per interval history  GI: Within normal limits   : Within normal limits  Musculoskeletal: Within normal limits  Skin: Within normal limits  Neurological: Within normal limits  Psychiatric: Within normal limits  Endocrine: Within normal limits  Hematologic/Lymphatic: metastatic breast cancer  Allergic/Immunologic: Within normal limits      MEDICATIONS:     Pulmonary "  ALBUTerol/ipratropium for Nebulization 3 milliLiter(s) Nebulizer every 6 hours  buDESOnide   0.5 milliGRAM(s) Respule 0.5 milliGRAM(s) Inhalation every 12 hours      Anti-microbials:  cefepime  IVPB 1000 milliGRAM(s) IV Intermittent every 12 hours      Cardiovascular:  furosemide   Injectable 60 milliGRAM(s) IV Push every 12 hours  propranolol 10 milliGRAM(s) Oral daily      Other:  albumin human  5% IVPB 250 milliLiter(s) IV Intermittent every 6 hours  dextrose 5%. 1000 milliLiter(s) IV Continuous <Continuous>  dextrose 50% Injectable 12.5 Gram(s) IV Push once  dextrose 50% Injectable 25 Gram(s) IV Push once  dextrose 50% Injectable 25 Gram(s) IV Push once  dextrose Gel 1 Dose(s) Oral once PRN  docusate sodium 100 milliGRAM(s) Oral three times a day  escitalopram 20 milliGRAM(s) Oral daily  glucagon  Injectable 1 milliGRAM(s) IntraMuscular once PRN  insulin glargine Injectable (LANTUS) 32 Unit(s) SubCutaneous at bedtime  insulin lispro (HumaLOG) corrective regimen sliding scale   SubCutaneous every 6 hours  nystatin Powder 1 Application(s) Topical two times a day  pantoprazole    Tablet 40 milliGRAM(s) Oral before breakfast  polyethylene glycol 3350 17 Gram(s) Oral at bedtime PRN  predniSONE   Tablet 50 milliGRAM(s) Oral daily  senna 2 Tablet(s) Oral at bedtime PRN        OBJECTIVE:    I&O's Detail    2018 07:01  -  2018 07:00  --------------------------------------------------------  IN:    Oral Fluid: 205 mL  Total IN: 205 mL    OUT:  Total OUT: 0 mL    Total NET: 205 mL    POCT Blood Glucose.: 136 mg/dL (2018 06:17)  POCT Blood Glucose.: 154 mg/dL (2018 23:59)  POCT Blood Glucose.: 135 mg/dL (2018 21:17)  POCT Blood Glucose.: 120 mg/dL (2018 16:29)  POCT Blood Glucose.: 109 mg/dL (2018 11:37)      PHYSICAL EXAM:       ICU Vital Signs Last 24 Hrs  T(C): 36.6 (2018 04:59), Max: 36.7 (2018 08:48)  T(F): 97.8 (2018 04:59), Max: 98.1 (2018 08:48)  HR: 82 (2018 04:59) (72 - 85)  BP: 125/75 (2018 04:59) (98/62 - 125/75)  BP(mean): --  ABP: --  ABP(mean): --  RR: 16 (2018 04:59) (16 - 18)  SpO2: 94% (2018 04:59) (93% - 96%)     General: Awake. Alert. Cooperative. No distress. Appears stated age 	  HEENT:   AT/NC/Anicteric. PERRL/EOMI. Normal oral mucosa,  Neck: Supple. Trachea midline. Thyroid without enlargement/tenderness/nodules. No carotid bruit. No JVD	  Cardiovascular: Regular rate and rhythm. S1 S2 normal. No M/R/G  Respiratory: Respirations unlabored. Bilateral wheeze. Decreased breath sounds at the bases with dullness  Abdomen: Soft. Non-tender. Distended. No organomegaly. No masses. Normal BS. Abdominal wall edema  Extremities: Warm to touch. Decreased lower extremity edema.   Pulses: 2+ peripheral pulses all extremities	  Skin: Normal skin color. No rashes or lesions. No ecchymoses. No cyanosis.  Warm to touch  Lymph Nodes: Cervical, supraclavicular and axillary nodes normal  Neurological: Motor and sensory examination equal and normal. A and O x 3  Psychiatry: Sleepy    LABS:                        8.8    7.5   )-----------( 55       ( 2018 06:35 )             25.8                         9.0    8.4   )-----------( 51       ( 2018 06:37 )             26.0         140  |  96  |  86<H>  ----------------------------<  142<H>  3.9   |  28  |  1.86<H>        138  |  95<L>  |  78<H>  ----------------------------<  111<H>  3.8   |  26  |  1.82<H>    Ca      9.8          Ca      9.3            TPro  5.6<L>  /  Alb  3.8  /  TBili  7.3<H>  /  DBili  3.9<H>  /  AST  59<H>  /  ALT  34  /  AlkPhos  174<H>      ABG - ( 2018 10:33 )  pH: 7.50  /  pCO2: 34    /  pO2: 73    / HCO3: 27    / Base Excess: 4.0   /  SaO2: 95        Procalcitonin, Serum: 1.41 ng/mL ( @ 10:14)    Procalcitonin, Serum: 1.48 ng/mL ( @ 11:10)    Ammonia, Serum (18 @ 06:35)    Ammonia, Serum: 61 umol/L    Ammonia, Serum (18 @ 17:59)    Ammonia, Serum: 47 umol/L    < from: Transthoracic Echocardiogram (18 @ 08:59) >    Patient name: STELLA SIMMONS  YOB: 1945   Age: 72 (F)   MR#: [id number]  Study Date: 2018  Location: [address] [place], [place] [id number]: [facility]  Study quality: Technically difficult  Referring Physician: Rick Jacobo MD  Blood Pressure: 124/72 mmHg  Height: 163 cm  Weight: 93 kg  BSA: 2 m2  ------------------------------------------------------------------------  PROCEDURE: Transthoracic echocardiogram with 2-D, M-Mode  and complete spectral and color flow Doppler.  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 Mild mitral  regurgitation.  Aortic Valve/Aorta: Calcified trileaflet aortic valve with  normal opening. Mild aortic regurgitation.  Normal aortic root size. (Ao: 2.9 cm at the sinuses of  Valsalva).  Left Atrium: Normal left atrium.  LA volume index = 26  cc/m2.  Left Ventricle: Normal left ventricular systolic function.  No segmental wall motion abnormalities. Normal left  ventricular internal dimensions and wall thicknesses.  Right Heart: Normal right atrium. The right ventricle is  not well visualized; grossly normal right ventricular  systolic function. Normal tricuspid valve. Mild-moderate  tricuspid regurgitation. Pulmonic valve not well  visualized, probably normal. Minimal pulmonic  regurgitation.  Pericardium/Pleura: No pericardial effusion seen. Bilateral  pleural effusions. Ascites seen.  Hemodynamic: Estimated right atrial pressure is 8 mm Hg.  Estimated right ventricular systolic pressure equals 42 mm  Hg, assuming right atrial pressure equals 8 mm Hg,  consistent with mild pulmonary hypertension.  ------------------------------------------------------------------------  Conclusions:  1. Calcified trileaflet aortic valve with normalopening.  Mild aortic regurgitation.  2. Normal left ventricular systolic function. No segmental  wall motion abnormalities.  3. The right ventricle is not well visualized; grossly  normal right ventricular systolic function.  4. No pericardial effusion seen. Bilateral pleural  effusions. Ascites seen.  *** No previous Echo exam.  ------------------------------------------------------------------------  Confirmed on  2018 - 15:24:26 by Petey Cortes M.D.  ------------------------------------------------------------------------    < end of copied text >  ------------------------------------------------------------------------------------------------  MICROBIOLOGY:     Culture - Body Fluid with Gram Stain (18 @ 19:03)    Gram Stain:   polymorphonuclear leukocytes seen  No organisms seen  by cytocentrifuge    Specimen Source: Peritoneal Peritoneal Fluid    Culture Results:   No growth    Culture - Acid Fast - Body Fluid w/Smear (18 @ 19:03)    Specimen Source: .Body Fluid Peritoneal Fluid    Acid Fast Bacilli Smear:   No acid fast bacilli seen by fluorochrome stain    Urinalysis Basic - ( 15 Feb 2018 16:13 )    Color: Karo / Appearance: Clear / S.016 / pH: x  Gluc: x / Ketone: Negative  / Bili: Small / Urobili: 1.0 mg/dL   Blood: x / Protein: Negative mg/dL / Nitrite: Negative   Leuk Esterase: Negative / RBC: 5 /HPF / WBC 2 /HPF   Sq Epi: x / Non Sq Epi: 21 /HPF / Bacteria: Occasional    Rapid Respiratory Viral Panel (18 @ 17:08)    Rapid RVP Result: NotDetec: The [employer] RVP Rapid uses polymerase chain reaction (PCR) and melt  curve analysis to screen for adenovirus; coronavirus HKU1, NL63, 229E,  OC43; human metapneumovirus (hMPV); human enterovirus/rhinovirus  (Entero/RV); influenza A; influenza A/H1;influenza A/H3; influenza  A/H1-2009; influenza B; parainfluenza viruses 1, 2, 3, 4; respiratory  syncytial virus; Bordetella pertussis; Mycoplasma pneumoniae; and  Chlamydophila pneumoniae.        RADIOLOGY:  [x] Chest radiographs reviewed and interpreted by me    < from: CT Chest No Cont (18 @ 11:27) >    EXAM:  CT CHEST                            PROCEDURE DATE:  2018        INTERPRETATION:  CLINICAL INFORMATION: Metastatic breast cancer status   post chest irradiation, rule out pneumonia    PROCEDURE:   CT of the Chest was performed without intravenous contrast.   Intravenous contrast: None.    Reconstructions were performed in axial thin, axial maximum intensity   projection, sagittal and coronal planes.    COMPARISON: CT abdomen pelvis 2018. Chest x-ray 2018.    FINDINGS:    LUNGS AND LARGE AIRWAYS: There is near complete occlusion of the left   lower lobe bronchus and partial collapse of the left lower lobe. There is   patchy opacity in the left lower lobe which may represent pneumonia..   Segmental atelectasis is noted within the bilateral upper lobes.   Nonspecific peripheral patchy and groundglass opacities in the right   upper lobe.  PLEURA: Moderate bilateral pleural effusions with associated lower lobe   passive atelectasis.  VESSELS: Atherosclerotic calcifications of the aorta and coronary   arteries.  HEART: Heart size is normal. Trace pericardial effusion.  MEDIASTINUM AND KENISHA: No lymphadenopathy.  CHEST WALL AND LOWER NECK: Left chest wall Mediport catheter distal tip   is in the SVC. Status post right mastectomy. Subcutaneous edema.  UPPER ABDOMEN: Cirrhotic liver with multiple hyper and hypodense lesions   is unchanged. Moderate volume perihepatic and perisplenic ascites.  BONES: Sclerotic osseous metastases, most predominantly in the T6 and T8   vertebral bodies, as well as in the sternum.    IMPRESSION:    Moderate bilateral pleural effusions with bilateral lower and upper lobe   atelectasis. Left lower lobe atelectasis may be secondary to a left lower   lobe bronchus mucous plug.    Patchy groundglass opacities in the right upper lobe and left lower lobe   may be secondary to infection.    Hepatic and blastic osseous metastases.    CHELSY ENCISO M.D., RADIOLOGY RESIDENT  This document has been electronically signed.  MICAH LOPEZ M.D., ATTENDING RADIOLOGIST  This document has been electronically signed. 2018 12:52PM      < end of copied text >  ---------------------------------------------------------------------------------------------------------  < from: Xray Abdomen 1 View PORTABLE -Urgent (18 @ 17:44) >    EXAM:  XR ABDOMEN PORTABLE URGENT 1V                            PROCEDURE DATE:  2018      INTERPRETATION:  EXAMINATION: XR ABDOMEN PORTABLE URGENT 1V    CLINICAL INDICATION: abdominal distention    TECHNIQUE: 2 portable views of the abdomen were obtained.    COMPARISON: CT abdomen 2018.    IMPRESSION:    The study is limited by technique and patient's body habitus. There are   nonspecific mildly dilated loops of small bowel. There is stool in the   rectum. There is centralization of the bowel loops, compatible with a   known ascites. There is a left pleural effusion and left basilar   atelectasis versus pneumonia. Again seen, are osseous metastasis. There   are degenerative changes of the visualized spine.    RIANAN SANTIAGO M.D., ATTENDING RADIOLOGIST  This document has been electronically signed. 2018  2:33PM      < end of copied text >  ---------------------------------------------------------------------------------------------------------  < from: NM Pulmonary Ventilation/Perfusion Scan (18 @ 10:56) >    EXAM:  NM PULM VENTILATION PERFUS IMG                            PROCEDURE DATE:  2018      INTERPRETATION:  RADIOPHARMACEUTICAL: 1 mCi Tc-99m-DTPA aerosol by   inhalation; 6 mCi Tc-99m-MAA, I.V.    CLINICAL STATEMENT: 72-year-old female with shortness of breath and   hypoxia.    IMPRESSION: Abnormal ventilation/perfusion lung scan.    Very low probability of pulmonary embolus.    YANELY TILLEY M.D., NUCLEAR MEDICINE ATTENDING  This document has been electronically signed. 2018 11:19AM      < end of copied text >  ---------------------------------------------------------------------------------------------------------    < from: CT Abdomen and Pelvis No Cont (18 @ 20:02) >    EXAM:  CT ABDOMEN AND PELVIS                            PROCEDURE DATE:  2018        INTERPRETATION:  CLINICAL INFORMATION: Elevated bilirubin. History of   breast cancer. Shortness of breath.    COMPARISON: None.    PROCEDURE:   CT of the Abdomen and Pelvis was performed without intravenous contrast.   Intravenous contrast: None.  Oral contrast: None.  Sagittal and coronal reformats were performed.    FINDINGS:    LOWER CHEST: Small left pleural effusion. Left basilar consolidation may   represent atelectasis or pneumonia. Coronary artery calcifications. Right   mastectomy.    LIVER: Nodular hepatic contour. Multiple bilobar ill-defined hypodense   hepatic lesions compatible with metastatic disease. Two lesions in the   right hepatic lobe demonstrate peripheral calcification.  BILE DUCTS: Normal caliber.  GALLBLADDER: Collapsed and therefore not well evaluated.  SPLEEN: Within normal limits.  PANCREAS: Within normal limits.  ADRENALS: Within normal limits.  KIDNEYS/URETERS: Subcentimeter hyperdense left renal focus too small to   characterize.    BLADDER: Within normal limits.  REPRODUCTIVE ORGANS: Anterior fundal fibroid.    BOWEL: Diverticulosis without diverticulitis. No bowel obstruction.   Appendix is not identified.  PERITONEUM: Moderate volume ascites.  VESSELS:  Atheromatous disease of the aorta.  RETROPERITONEUM: No lymphadenopathy.    ABDOMINAL WALL: Within normal limits.  BONES: Sclerotic osseous metastases in multiple vertebral bodies,   sternum, and ribs.    IMPRESSION:   Bilobar hepatic metastases.  Sclerotic osseous metastases.  Small left pleural effusion. Left basilar consolidation may represent   atelectasis or pneumonia.       ANAMARIA MORRIS M.D., RADIOLOGY RESIDENT  This document has been electronically signed.  MAGDY KEN M.D., ATTENDING RADIOLOGIST  This document has been electronically signed. 2018  8:27PM      < end of copied text >  ---------------------------------------------------------------------------------------------------------    < from: US Abdomen Doppler (18 @ 15:18) >    EXAM:  DUPLEX SCAN EXT VEINS LOWER BI                          EXAM:  US DPLX ABDOMEN                          PROCEDURE DATE:  2018      INTERPRETATION:  CLINICAL INFORMATION: Increasing liver function tests.   History of metastatic liver disease. Evaluation for portal vein   thrombosis.     EXAM: Focused grayscale, color, and spectral Doppler ultrasound imaging   was performed of the splenic, portal, and hepatic veins.    COMPARISON: CT abdomen pelvis dated 2018.    FINDINGS:    There is redemonstration of partially imaged metastatic liver disease,   nodular liver contour, and ascites.    The splenic, portal, hepatic veins are patent. There is no evidence of   thrombosis. The splenic and portal veins demonstrate hepatopedal flow.   Main portal vein measures 1.2 cm. The main portal vein has a peak   systolic velocity of 20 cm/s with phasic blood flow. The right and left   portal veins are patent.    Peak systolic velocity in the hepatic artery is 116.7 cm/s.    The intrahepatic IVC is patent.    IMPRESSION:    No evidence of splenic, portal, or hepatic vein thrombosis.    MILTON LAGOS M.D., RADIOLOGY RESIDENT  This document has been electronically signed.  SOFI REYEZ M.D., ATTENDING RADIOLOGIST  This document has been electronically signed. 2018  1:00PM      < end of copied text >  --------------------------------------------------------------------------------------------------------- 21.06

## 2021-09-16 NOTE — PATIENT PROFILE ADULT. - PATIENT REPRESENTATIVE PHONE
Nelly Swan is a 75 year old female presenting  For follow up labs     Trouble swallowing: Negative  Food stuck in throat: Negative   New excessively Dry skin: Negative   Thinning or course hair: Negative   Excess Fatigue: Negative     Concerns: none    Leg pain: resolved.    Allergies, history and meds reviewed and updated    Wore mask yes  Pt wore mask yes  COVID-19 Vaccine Interest Assessment:   • Patient reported already received outside of [facility]  If the patient reports an outside immunization, please update the WIR/ICARE information in the Immunizations activity     Health Maintenance Due   Topic Date Due   • COVID-19 Vaccine (3 - Moderna risk 3-dose series) 04/24/2021   • Influenza Vaccine (1) 09/01/2021   • Colorectal Cancer Screen-  10/14/2021   • Depression Screening  03/08/2022   • Medicare Wellness Visit  03/15/2022       Patient is due for the topics as listed above and wishes to proceed with them. Orders placed for Colorectal Cancer Screening: Colonoscopy.advised about vaccines        [phone]

## 2022-06-07 NOTE — CONSULT NOTE ADULT - CONSULT REQUESTED DATE/TIME
14-Feb-2018 14:28
14-Feb-2018 16:00
15-Feb-2018
15-Feb-2018 14:03
16-Feb-2018 09:47
25-Feb-2018 14:16
16-Feb-2018 09:51
19-Feb-2018 15:10
Griseofulvin Pregnancy And Lactation Text: This medication is Pregnancy Category X and is known to cause serious birth defects. It is unknown if this medication is excreted in breast milk but breast feeding should be avoided.

## 2022-10-19 NOTE — H&P ADULT - NSHPREVIEWOFSYSTEMS_GEN_ALL_CORE
CONSTITUTIONAL: generalized weakness  EYES/ENT: No visual changes;  No dysphagia  NECK: No pain or stiffness  RESPIRATORY: No cough, wheezing, hemoptysis; + shortness of breath  CARDIOVASCULAR: No chest pain or palpitations; + lower extremity edema  EXTREMITIES: + le edema, cyanosis, clubbing  MUSCULOSKELETAL: no joint pain, swelling  GASTROINTESTINAL: No abdominal or epigastric pain. No nausea, vomiting, or hematemesis; No diarrhea or constipation. No melena or hematochezia.  BACK: No back pain  GENITOURINARY: No dysuria; increased urination  NEUROLOGICAL: No numbness or weakness  SKIN: No itching, burning, rashes, or lesions   PSYCH: no agitation  All other review of systems is negative unless indicated above.
n/a
